# Patient Record
Sex: FEMALE | Race: WHITE | NOT HISPANIC OR LATINO
[De-identification: names, ages, dates, MRNs, and addresses within clinical notes are randomized per-mention and may not be internally consistent; named-entity substitution may affect disease eponyms.]

---

## 2017-07-18 ENCOUNTER — APPOINTMENT (OUTPATIENT)
Dept: ORTHOPEDIC SURGERY | Facility: CLINIC | Age: 52
End: 2017-07-18
Payer: COMMERCIAL

## 2017-07-18 VITALS — BODY MASS INDEX: 19.66 KG/M2 | HEIGHT: 65 IN | WEIGHT: 118 LBS

## 2017-07-18 DIAGNOSIS — Z84.89 FAMILY HISTORY OF OTHER SPECIFIED CONDITIONS: ICD-10-CM

## 2017-07-18 DIAGNOSIS — S61.217D LACERATION W/OUT FOREIGN BODY OF LEFT LITTLE FINGER W/OUT DAMAGE TO NAIL, SUBSEQUENT ENCOUNTER: ICD-10-CM

## 2017-07-18 DIAGNOSIS — Z82.62 FAMILY HISTORY OF OSTEOPOROSIS: ICD-10-CM

## 2017-07-18 DIAGNOSIS — Z80.9 FAMILY HISTORY OF MALIGNANT NEOPLASM, UNSPECIFIED: ICD-10-CM

## 2017-07-18 DIAGNOSIS — Z78.9 OTHER SPECIFIED HEALTH STATUS: ICD-10-CM

## 2017-07-18 DIAGNOSIS — Z85.850 PERSONAL HISTORY OF MALIGNANT NEOPLASM OF THYROID: ICD-10-CM

## 2017-07-18 PROCEDURE — 99203 OFFICE O/P NEW LOW 30 MIN: CPT

## 2017-07-18 PROCEDURE — 73140 X-RAY EXAM OF FINGER(S): CPT | Mod: F4

## 2018-04-01 ENCOUNTER — EMERGENCY (EMERGENCY)
Facility: HOSPITAL | Age: 53
LOS: 1 days | Discharge: HOME | End: 2018-04-01
Admitting: EMERGENCY MEDICINE
Payer: COMMERCIAL

## 2018-04-01 PROCEDURE — 73030 X-RAY EXAM OF SHOULDER: CPT | Mod: 26,LT

## 2018-04-01 PROCEDURE — 99283 EMERGENCY DEPT VISIT LOW MDM: CPT | Mod: 25

## 2018-04-02 PROCEDURE — 96372 THER/PROPH/DIAG INJ SC/IM: CPT

## 2018-04-02 PROCEDURE — 99283 EMERGENCY DEPT VISIT LOW MDM: CPT | Mod: 25

## 2018-04-02 PROCEDURE — 73030 X-RAY EXAM OF SHOULDER: CPT

## 2018-04-04 ENCOUNTER — APPOINTMENT (OUTPATIENT)
Dept: ULTRASOUND IMAGING | Facility: CLINIC | Age: 53
End: 2018-04-04
Payer: COMMERCIAL

## 2018-04-04 ENCOUNTER — OUTPATIENT (OUTPATIENT)
Dept: OUTPATIENT SERVICES | Facility: HOSPITAL | Age: 53
LOS: 1 days | End: 2018-04-04
Payer: COMMERCIAL

## 2018-04-04 DIAGNOSIS — Z00.8 ENCOUNTER FOR OTHER GENERAL EXAMINATION: ICD-10-CM

## 2018-04-04 PROCEDURE — 76881 US COMPL JOINT R-T W/IMG: CPT

## 2018-04-04 PROCEDURE — 76881 US COMPL JOINT R-T W/IMG: CPT | Mod: 26,LT

## 2018-04-05 ENCOUNTER — APPOINTMENT (OUTPATIENT)
Dept: MRI IMAGING | Facility: HOSPITAL | Age: 53
End: 2018-04-05
Payer: COMMERCIAL

## 2018-04-05 ENCOUNTER — OUTPATIENT (OUTPATIENT)
Dept: OUTPATIENT SERVICES | Facility: HOSPITAL | Age: 53
LOS: 1 days | End: 2018-04-05
Payer: COMMERCIAL

## 2018-04-05 DIAGNOSIS — Z00.8 ENCOUNTER FOR OTHER GENERAL EXAMINATION: ICD-10-CM

## 2018-04-05 PROCEDURE — 73221 MRI JOINT UPR EXTREM W/O DYE: CPT | Mod: 26,LT

## 2018-04-05 PROCEDURE — 73221 MRI JOINT UPR EXTREM W/O DYE: CPT

## 2018-07-03 ENCOUNTER — RESULT REVIEW (OUTPATIENT)
Age: 53
End: 2018-07-03

## 2019-08-19 ENCOUNTER — RESULT REVIEW (OUTPATIENT)
Age: 54
End: 2019-08-19

## 2019-09-25 ENCOUNTER — APPOINTMENT (OUTPATIENT)
Dept: CARDIOLOGY | Facility: CLINIC | Age: 54
End: 2019-09-25
Payer: COMMERCIAL

## 2019-09-25 ENCOUNTER — NON-APPOINTMENT (OUTPATIENT)
Age: 54
End: 2019-09-25

## 2019-09-25 VITALS — HEART RATE: 56 BPM | DIASTOLIC BLOOD PRESSURE: 70 MMHG | SYSTOLIC BLOOD PRESSURE: 100 MMHG

## 2019-09-25 VITALS
HEART RATE: 57 BPM | OXYGEN SATURATION: 100 % | RESPIRATION RATE: 16 BRPM | SYSTOLIC BLOOD PRESSURE: 94 MMHG | HEIGHT: 65 IN | BODY MASS INDEX: 20.16 KG/M2 | DIASTOLIC BLOOD PRESSURE: 64 MMHG | WEIGHT: 121 LBS

## 2019-09-25 DIAGNOSIS — Z87.891 PERSONAL HISTORY OF NICOTINE DEPENDENCE: ICD-10-CM

## 2019-09-25 PROCEDURE — 93000 ELECTROCARDIOGRAM COMPLETE: CPT

## 2019-09-25 PROCEDURE — 99244 OFF/OP CNSLTJ NEW/EST MOD 40: CPT

## 2019-09-25 PROCEDURE — 93306 TTE W/DOPPLER COMPLETE: CPT

## 2019-10-15 ENCOUNTER — APPOINTMENT (OUTPATIENT)
Dept: CARDIOLOGY | Facility: CLINIC | Age: 54
End: 2019-10-15

## 2019-10-18 ENCOUNTER — APPOINTMENT (OUTPATIENT)
Dept: MRI IMAGING | Facility: HOSPITAL | Age: 54
End: 2019-10-18
Payer: COMMERCIAL

## 2019-10-18 ENCOUNTER — OUTPATIENT (OUTPATIENT)
Dept: OUTPATIENT SERVICES | Facility: HOSPITAL | Age: 54
LOS: 1 days | End: 2019-10-18
Payer: COMMERCIAL

## 2019-10-18 DIAGNOSIS — Z00.8 ENCOUNTER FOR OTHER GENERAL EXAMINATION: ICD-10-CM

## 2019-10-18 PROCEDURE — 73221 MRI JOINT UPR EXTREM W/O DYE: CPT | Mod: 26,RT

## 2019-10-18 PROCEDURE — 73221 MRI JOINT UPR EXTREM W/O DYE: CPT

## 2019-10-31 ENCOUNTER — APPOINTMENT (OUTPATIENT)
Dept: RADIOLOGY | Facility: HOSPITAL | Age: 54
End: 2019-10-31
Payer: COMMERCIAL

## 2019-10-31 ENCOUNTER — OUTPATIENT (OUTPATIENT)
Dept: OUTPATIENT SERVICES | Facility: HOSPITAL | Age: 54
LOS: 1 days | End: 2019-10-31
Payer: COMMERCIAL

## 2019-10-31 DIAGNOSIS — M54.9 DORSALGIA, UNSPECIFIED: ICD-10-CM

## 2019-10-31 PROCEDURE — 72100 X-RAY EXAM L-S SPINE 2/3 VWS: CPT | Mod: 26

## 2019-10-31 PROCEDURE — 72100 X-RAY EXAM L-S SPINE 2/3 VWS: CPT

## 2020-04-25 ENCOUNTER — MESSAGE (OUTPATIENT)
Age: 55
End: 2020-04-25

## 2020-05-15 ENCOUNTER — APPOINTMENT (OUTPATIENT)
Dept: DISASTER EMERGENCY | Facility: CLINIC | Age: 55
End: 2020-05-15

## 2020-05-15 LAB
SARS-COV-2 IGG SERPL IA-ACNC: 0 INDEX
SARS-COV-2 IGG SERPL QL IA: NEGATIVE

## 2020-07-09 ENCOUNTER — APPOINTMENT (OUTPATIENT)
Dept: ORTHOPEDIC SURGERY | Facility: CLINIC | Age: 55
End: 2020-07-09
Payer: COMMERCIAL

## 2020-07-09 ENCOUNTER — OUTPATIENT (OUTPATIENT)
Dept: OUTPATIENT SERVICES | Facility: HOSPITAL | Age: 55
LOS: 1 days | End: 2020-07-09
Payer: COMMERCIAL

## 2020-07-09 ENCOUNTER — APPOINTMENT (OUTPATIENT)
Dept: RADIOLOGY | Facility: HOSPITAL | Age: 55
End: 2020-07-09
Payer: COMMERCIAL

## 2020-07-09 DIAGNOSIS — Z87.39 PERSONAL HISTORY OF OTHER DISEASES OF THE MUSCULOSKELETAL SYSTEM AND CONNECTIVE TISSUE: ICD-10-CM

## 2020-07-09 DIAGNOSIS — Z00.8 ENCOUNTER FOR OTHER GENERAL EXAMINATION: ICD-10-CM

## 2020-07-09 DIAGNOSIS — Z86.39 PERSONAL HISTORY OF OTHER ENDOCRINE, NUTRITIONAL AND METABOLIC DISEASE: ICD-10-CM

## 2020-07-09 DIAGNOSIS — Z87.19 PERSONAL HISTORY OF OTHER DISEASES OF THE DIGESTIVE SYSTEM: ICD-10-CM

## 2020-07-09 PROCEDURE — 72040 X-RAY EXAM NECK SPINE 2-3 VW: CPT

## 2020-07-09 PROCEDURE — 99203 OFFICE O/P NEW LOW 30 MIN: CPT

## 2020-07-09 PROCEDURE — 72070 X-RAY EXAM THORAC SPINE 2VWS: CPT

## 2020-07-10 PROCEDURE — 72070 X-RAY EXAM THORAC SPINE 2VWS: CPT | Mod: 26

## 2020-07-10 PROCEDURE — 72040 X-RAY EXAM NECK SPINE 2-3 VW: CPT | Mod: 26

## 2020-07-15 ENCOUNTER — TRANSCRIPTION ENCOUNTER (OUTPATIENT)
Age: 55
End: 2020-07-15

## 2020-07-16 PROBLEM — Z86.39 HISTORY OF HASHIMOTO THYROIDITIS: Status: RESOLVED | Noted: 2020-07-16 | Resolved: 2020-07-16

## 2020-07-16 PROBLEM — Z87.19 HISTORY OF CELIAC DISEASE: Status: RESOLVED | Noted: 2020-07-16 | Resolved: 2020-07-16

## 2020-07-16 PROBLEM — Z87.39 HISTORY OF FIBROMYALGIA: Status: RESOLVED | Noted: 2020-07-16 | Resolved: 2020-07-16

## 2020-07-16 RX ORDER — GABAPENTIN 100 MG/1
100 CAPSULE ORAL
Refills: 0 | Status: DISCONTINUED | COMMUNITY
Start: 2019-09-25 | End: 2020-07-16

## 2020-07-16 RX ORDER — IBUPROFEN 100 MG
TABLET,CHEWABLE ORAL
Refills: 0 | Status: DISCONTINUED | COMMUNITY
End: 2020-07-16

## 2020-07-16 NOTE — DISCUSSION/SUMMARY
[de-identified] : The underlying pathophysiology was reviewed in great detail with the patient as well as the various treatment options, including ice, analgesics, NSAIDs, Physical therapy, steroid injections, hyaluronic gel injections. \par \par Discussed and reviewed previous operation reports and surgical procedure.\par \par A prescription for Physical Therapy was provided.\par \par Activity modifications and restrictions were discussed. I advised avoiding overhead lifting. I advised the patient to work on good posture.\par \par FU 6 weeks or prn. \par \par All questions were answered, all alternatives discussed and the patient is in complete agreement with that plan. Follow-up appointment as instructed. Any issues and the patient will call or come in sooner.\par

## 2020-07-16 NOTE — HISTORY OF PRESENT ILLNESS
[de-identified] : FAISAL MOFFETT is a 54 year female presenting to the office complaining of bilateral shoulder pain. Patient has pertinent past medical history of fibromyalgia. Patient reports pain for approximately 10 years. Patient denies recent injury or trauma to the area.  On 02/19/2019 She had a left shoulder subacromial decompression, excision of distal clavicle, and labral repair with biceps tenodesis with Dr. Barry at [facility] [place]. On 11/29/2019 patient had a right shoulder rotator cuff repair, subacromial decompression, elbow extensor and flexor debridement, excisional lipoma, arthrotomy elbow repair of flexor and extensor tendon with Dr. Barry at [facility] [place].\par The patient describes the pain as a dull aching, and occasionally sharp pain localized to the anterior aspect of her bilateral shoulders that is intermittent in nature. Her  symptoms are exacerbated with any movement of the shoulder.  Patient reports the pain is waking her up at night.  Patient reports associated weakness. Denies numbness and tingling in the upper extremity.  She reports pain radiating down to her elbows on bilateral arms.  Patient is taking Duexis 800mg  for pain relief with mild relief in symptoms. Patient denies any other complaints at this time. Of note, patient saw a physician for neck pain yesterday. She has not had any imaging at this time. She reports numbness down the posterior aspect of her neck as well. Patient is interested in returning to physical therapy at this time.

## 2020-07-16 NOTE — PHYSICAL EXAM
[de-identified] : Cervical Spine/Neck\par Inspection/Palpation :\par ¦ Inspection : alignment midline, normal degree of lordosis present\par ¦ Skin : normal appearance, no masses, no tenderness, trachea midline\par ¦ Palpation : musculature is tender to palpation\par ¦ Tests and Signs : Spurling’s (-), Lhermitte’s (-)\par ¦ Range of Motion : arc of motion full in all planes, no crepitus or discomfort with flexion and extension. \par ¦ Stability : no subluxations or other evidence of instability demonstrated during range of motion testing\par o Muscle Strength : paraspinal muscle strength within normal limits\par o Muscle Tone : paraspinal muscle tone within normal limits\par o Muscle Bulk : normal, no atrophy\par o Cervical Lymph Nodes : no lymphadenopathy present\par \par Right Upper Extremity\par o Shoulder :\par ¦ Inspection/Palpation : tenderness to palpation greater tuberosity, no swelling, no deformities, surgical incisions well healed. \par ¦ Range of Motion : ACTIVE FORWARD ELEVATION: Measured at 145 degrees, ACTIVE EXTERNAL ROTATION: Measured at 30 degrees, ACTIVE INTERNAL ROTATION: Measured at L3\par ¦ Strength : external rotation 5/5, internal rotation 5/5, supraspinatus 5/5\par ¦ Stability : no joint instability on provocative testing\par ¦ Tests/Signs : Neer (-), Moreno (-)\par o Upper Arm : mild upper arm tenderness to palpation, no swelling, no deformities\par o Muscle Bulk : no atrophy\par o Sensation : sensation intact to light touch\par o Skin : no skin rash or discoloration\par o Vascular Exam : no edema, no cyanosis, radial and ulnar pulses normal\par \par o Elbow :\par ¦ Inspection/Palpation : tenderness to palpation medial and lateral epicondyle, no swelling, no deformities\par ¦ Range of Motion : full and painless in all planes, no crepitus\par ¦ Strength : flexion and extension 5/5, Resisted wrist and finger flexion and extension is pain free, and equal compared bilaterally. \par ¦ Stability : no joint instability on provocative testing\par o Muscle Bulk : no atrophy\par o Sensation : sensation intact to light touch\par o Skin : no skin lesions, no discoloration\par o Vascular Exam : no edema, no cyanosis, radial and ulnar pulses normal \par \par Left Upper Extremity\par o Shoulder :\par ¦ Inspection/Palpation :  tenderness to palpation greater tuberosity, no swelling, no deformities, surgical incisions well healed. \par ¦ Range of Motion : ACTIVE FORWARD ELEVATION: Measured at 155 degrees, ACTIVE EXTERNAL ROTATION: Measured at 50 degrees, ACTIVE INTERNAL ROTATION: Measured at T8\par ¦ Strength : external rotation 5/5, internal rotation 5/5, supraspinatus 5/5\par ¦ Stability : no joint instability on provocative testing\par ¦ Tests/Signs : Neer (-), Moreno (-)\par o Upper Arm : mild upper arm tenderness to palpation, no swelling, no deformities\par o Muscle Bulk : no atrophy\par o Sensation : sensation intact to light touch\par o Skin : no skin rash or discoloration\par o Vascular Exam : no edema, no cyanosis, radial and ulnar pulses normal\par \par o Elbow :\par ¦ Inspection/Palpation :  tenderness to palpation medial and lateral epicondyle, no swelling, no deformities\par ¦ Range of Motion : full and painless in all planes, no crepitus\par ¦ Strength : flexion and extension 5/5, Resisted wrist and finger flexion and extension is pain free, and equal compared bilaterally. \par ¦ Stability : no joint instability on provocative testing\par o Muscle Bulk : no atrophy\par o Sensation : sensation intact to light touch\par o Skin : no skin lesions, no discoloration\par o Vascular Exam : no edema, no cyanosis, radial and ulnar pulses normal

## 2020-07-24 ENCOUNTER — APPOINTMENT (OUTPATIENT)
Dept: ORTHOPEDIC SURGERY | Facility: CLINIC | Age: 55
End: 2020-07-24
Payer: COMMERCIAL

## 2020-07-24 VITALS
HEIGHT: 65 IN | SYSTOLIC BLOOD PRESSURE: 110 MMHG | DIASTOLIC BLOOD PRESSURE: 65 MMHG | BODY MASS INDEX: 20.83 KG/M2 | HEART RATE: 68 BPM | WEIGHT: 125 LBS | TEMPERATURE: 97.9 F

## 2020-07-24 DIAGNOSIS — M41.9 SCOLIOSIS, UNSPECIFIED: ICD-10-CM

## 2020-07-24 PROCEDURE — 72100 X-RAY EXAM L-S SPINE 2/3 VWS: CPT

## 2020-07-24 PROCEDURE — 99214 OFFICE O/P EST MOD 30 MIN: CPT

## 2020-07-24 NOTE — HISTORY OF PRESENT ILLNESS
[de-identified] : Ms. FAISAL MOFFETT  is a 54 year old female who presents with a chronic history of neck and upper thoracic pain.  Recently she has been having intermittent low back pain and at night her back can seize up.   She can get numbness down both arms.  Normal bowel and bladder control.   Denies any recent fevers, chills, sweats, weight loss, or infection.  She is doing physical therapy which has helped her range of motion.  She has taken a muscle relaxer which will help.  She is taking duexis. \par \par The patients past medical history, past surgical history, medications, allergies, and social history were reviewed by me today with the patient and documented accordingly.  In addition, the patient's family history, which is noncontributory to their visit, was also reviewed.\par

## 2020-07-24 NOTE — DISCUSSION/SUMMARY
[de-identified] : We discussed further treatment options.  She will continue with physical therapy.  We will obtain an MRI of her cervical spine.  Follow-up afterwards.

## 2020-07-24 NOTE — PHYSICAL EXAM
[de-identified] : Examination of the cervical spine reveals no midline or paraspinal tenderness to palpation. No cervical lymphadenopathy. Decreased range of motion with respect to flexion, extension, rotation, and lateral bending. Negative Spurlings. Negative Lhermitte's. Full range of motion bilateral shoulders without evidence of impingement. No instability of bilateral upper extremities.  Cranial nerves II through XII grossly intact. Intact sensation bilateral upper extremities. 5/5 deltoids biceps triceps wrist extensors wrist flexors finger flexors and hand intrinsics. 1+ biceps triceps and brachioradialis reflexes. Negative Arevalo's. 2+ radial pulse. Negative Tinel's over the cubital and carpal tunnel. No skin lesions on the right and left upper extremities.\par \par Examination of the thoracic and lumbar spine reveals no midline tenderness palpation, step-offs, or skin lesions. Decreased range of motion with respect to flexion, extension, lateral bending, and rotation. No tenderness to palpation of the sciatic notch. No tenderness palpation of the bilateral greater trochanters. No pain with passive internal/external rotation of the hips. No instability of bilateral lower extremities.  Negative ANGELIQUE. Negative straight leg raise bilaterally. No bowstring. Negative femoral stretch. 5 out of 5 iliopsoas, hip abductors, hips adductors, quadriceps, hamstrings, gastrocsoleus, tibialis anterior, extensor hallucis longus, peroneals. Grossly intact sensation to light touch bilateral lower extremities. 1+ patellar and Achilles reflexes. Downgoing Babinski. No clonus. Intact proprioception. Palpable pulses. No skin lesion and no edema on the right and left lower extremities. [de-identified] : Review of her previous thoracic x-rays reveal scoliosis\par \par AP lateral lumbar x-rays reveal some spondylosis

## 2020-07-25 ENCOUNTER — APPOINTMENT (OUTPATIENT)
Dept: CT IMAGING | Facility: IMAGING CENTER | Age: 55
End: 2020-07-25
Payer: COMMERCIAL

## 2020-07-25 ENCOUNTER — OUTPATIENT (OUTPATIENT)
Dept: OUTPATIENT SERVICES | Facility: HOSPITAL | Age: 55
LOS: 1 days | End: 2020-07-25
Payer: COMMERCIAL

## 2020-07-25 DIAGNOSIS — K57.90 DIVERTICULOSIS OF INTESTINE, PART UNSPECIFIED, WITHOUT PERFORATION OR ABSCESS WITHOUT BLEEDING: ICD-10-CM

## 2020-07-25 DIAGNOSIS — R10.84 GENERALIZED ABDOMINAL PAIN: ICD-10-CM

## 2020-07-25 DIAGNOSIS — K59.04 CHRONIC IDIOPATHIC CONSTIPATION: ICD-10-CM

## 2020-07-25 PROCEDURE — 74177 CT ABD & PELVIS W/CONTRAST: CPT | Mod: 26

## 2020-07-25 PROCEDURE — 74177 CT ABD & PELVIS W/CONTRAST: CPT

## 2020-08-01 ENCOUNTER — APPOINTMENT (OUTPATIENT)
Dept: MRI IMAGING | Facility: HOSPITAL | Age: 55
End: 2020-08-01
Payer: COMMERCIAL

## 2020-08-01 ENCOUNTER — RESULT REVIEW (OUTPATIENT)
Age: 55
End: 2020-08-01

## 2020-08-01 ENCOUNTER — OUTPATIENT (OUTPATIENT)
Dept: OUTPATIENT SERVICES | Facility: HOSPITAL | Age: 55
LOS: 1 days | End: 2020-08-01
Payer: COMMERCIAL

## 2020-08-01 DIAGNOSIS — M54.12 RADICULOPATHY, CERVICAL REGION: ICD-10-CM

## 2020-08-01 PROCEDURE — 72141 MRI NECK SPINE W/O DYE: CPT | Mod: 26

## 2020-08-01 PROCEDURE — 72141 MRI NECK SPINE W/O DYE: CPT

## 2020-08-07 ENCOUNTER — APPOINTMENT (OUTPATIENT)
Dept: ORTHOPEDIC SURGERY | Facility: CLINIC | Age: 55
End: 2020-08-07
Payer: COMMERCIAL

## 2020-08-07 PROCEDURE — 99214 OFFICE O/P EST MOD 30 MIN: CPT | Mod: 95

## 2020-08-08 NOTE — HISTORY OF PRESENT ILLNESS
[Home] : at home, [unfilled] , at the time of the visit. [Medical Office: ([facility])___] : at the medical office located in  [Verbal consent obtained from patient] : the patient, [unfilled] [de-identified] : Patient returns for follow-up today.  Symptoms are grossly unchanged.  She has had a cervical spine MRI.\par \par The patients past medical history, past surgical history, medications, allergies, and social history were reviewed by me today with the patient and documented accordingly.  In addition, the patient's family history, which is noncontributory to their visit, was also reviewed.\par

## 2020-08-08 NOTE — DISCUSSION/SUMMARY
[de-identified] : We reviewed her MRI.  We discussed further treatment options both nonsurgical and surgical.  She wishes to continue with nonsurgical treatment.  She will let me know of any changes or worsening of her symptoms.

## 2020-08-08 NOTE — PHYSICAL EXAM
[de-identified] : Review of her previous thoracic x-rays reveal scoliosis\par \par AP lateral lumbar x-rays reveal some spondylosis\par \par Cervical MRI does not reveal high-grade central compression.  She does have some areas of foraminal stenosis. [de-identified] : Examination of the cervical spine reveals no midline or paraspinal tenderness to palpation. Decreased range of motion with respect to flexion, extension, rotation, and lateral bending. Negative Spurlings. Negative Lhermitte's. Full range of motion bilateral shoulders without evidence of impingement. No instability of bilateral upper extremities.  Cranial nerves II through XII grossly intact.  Grossly preserved strength and sensation bilateral upper extremities no skin lesions on the right and left upper extremities.\par

## 2020-08-11 ENCOUNTER — RESULT REVIEW (OUTPATIENT)
Age: 55
End: 2020-08-11

## 2020-08-24 ENCOUNTER — APPOINTMENT (OUTPATIENT)
Dept: ORTHOPEDIC SURGERY | Facility: CLINIC | Age: 55
End: 2020-08-24
Payer: COMMERCIAL

## 2020-08-24 DIAGNOSIS — Z98.890 OTHER SPECIFIED POSTPROCEDURAL STATES: ICD-10-CM

## 2020-08-24 DIAGNOSIS — M25.521 PAIN IN RIGHT ELBOW: ICD-10-CM

## 2020-08-24 DIAGNOSIS — M25.522 PAIN IN LEFT ELBOW: ICD-10-CM

## 2020-08-24 DIAGNOSIS — M75.42 IMPINGEMENT SYNDROME OF LEFT SHOULDER: ICD-10-CM

## 2020-08-24 PROCEDURE — 99214 OFFICE O/P EST MOD 30 MIN: CPT

## 2020-08-24 RX ORDER — GABAPENTIN 600 MG/1
600 TABLET, COATED ORAL
Refills: 0 | Status: DISCONTINUED | COMMUNITY
Start: 2020-07-16 | End: 2020-08-24

## 2020-08-24 RX ORDER — GABAPENTIN 100 MG/1
100 CAPSULE ORAL EVERY MORNING
Refills: 0 | Status: DISCONTINUED | COMMUNITY
Start: 2020-07-16 | End: 2020-08-24

## 2020-08-24 NOTE — HISTORY OF PRESENT ILLNESS
[de-identified] : FAISAL MOFFETT is a 54 year female presenting to the office complaining of bilateral shoulder pain. Patient has pertinent past medical history of fibromyalgia. Patient reports pain for approximately 10 years. Patient denies recent injury or trauma to the area.  On 02/19/2019 She had a left shoulder subacromial decompression, excision of distal clavicle, and labral repair with biceps tenodesis [facility] Dr[facility] hospitals. On 11/29/2019 patient had a right shoulder rotator cuff repair, subacromial decompression, elbow extensor and flexor debridement, excisional lipoma, arthrotomy elbow repair of flexor and extensor tendon with Dr. Barry at hospitals.\par Since last visit patient reports completing the 9 approved visits of physical therapy. She reports bilateral shoulder fatigue and pain during activity. Patient reports crepitus of the left shoulder during range of motion, this is new since last visit. The patient describes the pain as a dull aching, and occasionally sharp pain localized to the anterior aspect of her bilateral shoulders that is intermittent in nature. Her  symptoms are exacerbated with any movement of the shoulder.  Patient reports the pain is waking her up at night.  Patient reports associated weakness. Denies numbness and tingling in the upper extremity.  She reports pain radiating down to her elbows on bilateral arms. She reports continued elbow pain with no change since last visit. She recently purchased the [employer]pper braces for her elbows. Patient is taking Duexis 800mg for pain relief with mild relief in symptoms.  She was recently started on Lyrica 50 mg bid and discontinued Gabapentin. Patient denies any other complaints at this time. Of note, patient is under the care of Dr. Santos for cervical spinal stenosis.

## 2020-08-24 NOTE — PHYSICAL EXAM
[de-identified] : Right Upper Extremity\par o Shoulder :\par ¦ Inspection/Palpation : tenderness to palpation greater tuberosity, no swelling, no deformities, surgical incisions well healed. \par ¦ Range of Motion : ACTIVE FORWARD ELEVATION: Measured at 145 degrees, ACTIVE EXTERNAL ROTATION: Measured at 40 degrees, ACTIVE INTERNAL ROTATION: Measured at L1\par ¦ Strength : external rotation 5/5, internal rotation 5/5, supraspinatus 5/5\par ¦ Stability : no joint instability on provocative testing\par ¦ Tests/Signs : Neer (-), Moreno (-)\par o Upper Arm : no upper arm tenderness to palpation, no swelling, no deformities\par o Muscle Bulk : no atrophy\par o Sensation : sensation intact to light touch\par o Skin : no skin rash or discoloration\par o Vascular Exam : no edema, no cyanosis, radial and ulnar pulses normal\par \par o Elbow :\par ¦ Inspection/Palpation : mild tenderness to palpation medial and lateral epicondyle, no swelling, no deformities\par ¦ Range of Motion : full and painless in all planes, no crepitus\par ¦ Strength : flexion and extension 5/5, Resisted wrist and finger flexion and extension is pain free, and equal compared bilaterally. \par ¦ Stability : no joint instability on provocative testing\par o Muscle Bulk : no atrophy\par o Sensation : sensation intact to light touch\par o Skin : no skin lesions, no discoloration\par o Vascular Exam : no edema, no cyanosis, radial and ulnar pulses normal \par \par Left Upper Extremity\par o Shoulder :\par ¦ Inspection/Palpation :  tenderness to palpation greater tuberosity, no swelling, no deformities, surgical incisions well healed. \par ¦ Range of Motion : ACTIVE FORWARD ELEVATION: Measured at 150 degrees, with subacromial crepitus,  ACTIVE EXTERNAL ROTATION: Measured at 50 degrees, ACTIVE INTERNAL ROTATION: Measured at L1\par ¦ Strength : external rotation 5/5, internal rotation 5/5, supraspinatus 5/5\par ¦ Stability : no joint instability on provocative testing\par ¦ Tests/Signs : Neer (+), Moreno (-)\par o Upper Arm : no upper arm tenderness to palpation, no swelling, no deformities\par o Muscle Bulk : no atrophy\par o Sensation : sensation intact to light touch\par o Skin : no skin rash or discoloration\par o Vascular Exam : no edema, no cyanosis, radial and ulnar pulses normal\par \par o Elbow :\par ¦ Inspection/Palpation :  mild tenderness to palpation medial and lateral epicondyle, no swelling, no deformities\par ¦ Range of Motion : full and painless in all planes, no crepitus\par ¦ Strength : flexion and extension 5/5, Resisted wrist and finger flexion and extension is pain free, and equal compared bilaterally. \par ¦ Stability : no joint instability on provocative testing\par o Muscle Bulk : no atrophy\par o Sensation : sensation intact to light touch\par o Skin : no skin lesions, no discoloration\par o Vascular Exam : no edema, no cyanosis, radial and ulnar pulses normal

## 2020-08-24 NOTE — DISCUSSION/SUMMARY
[de-identified] : The underlying pathophysiology was reviewed in great detail with the patient as well as the various treatment options, including ice, analgesics, NSAIDs, Physical therapy, steroid injections, hyaluronic gel injections. \par \par A home exercise sheet was given and discussed with the patient to follow.\par  \par Activity modifications and restrictions were discussed. I advised avoiding overhead lifting. I advised the patient to work on good posture. Activity modifications and restrictions were discussed. I recommend that she avoid heavy gripping/squeezing.  \par \par FU 6 weeks or prn. \par \par All questions were answered, all alternatives discussed and the patient is in complete agreement with that plan. Follow-up appointment as instructed. Any issues and the patient will call or come in sooner.\par

## 2020-12-04 ENCOUNTER — APPOINTMENT (OUTPATIENT)
Dept: PAIN MANAGEMENT | Facility: CLINIC | Age: 55
End: 2020-12-04
Payer: COMMERCIAL

## 2020-12-04 VITALS
WEIGHT: 129 LBS | DIASTOLIC BLOOD PRESSURE: 75 MMHG | HEIGHT: 65 IN | BODY MASS INDEX: 21.49 KG/M2 | SYSTOLIC BLOOD PRESSURE: 128 MMHG | HEART RATE: 128 BPM

## 2020-12-04 VITALS — TEMPERATURE: 96.5 F

## 2020-12-04 PROCEDURE — 99072 ADDL SUPL MATRL&STAF TM PHE: CPT

## 2020-12-04 PROCEDURE — 99204 OFFICE O/P NEW MOD 45 MIN: CPT

## 2020-12-04 RX ORDER — DULOXETINE HYDROCHLORIDE 20 MG/1
20 CAPSULE, DELAYED RELEASE ORAL
Refills: 0 | Status: DISCONTINUED | COMMUNITY
Start: 2020-07-09 | End: 2020-12-04

## 2020-12-04 NOTE — HISTORY OF PRESENT ILLNESS
[FreeTextEntry1] : Patient is a 55F with a hx of fibromyalgia, Hashimoto's thyroiditis, celiac disease, IBS presenting for initial consultation for pain. She follows with a rheumatologist Dr. Ирина Maldonado and was diagnosed with fibromyalgia about a year ago. She is currently taking lyrica 50 mg BID and continues to have breakthrough pain. She was previously on gabapentin but this made her very sleepy. She reports pain all over her body but especially in the shoulders, elbows, and knees as well as significant neck and back pain. She had MRI of the cervical spine in Aug 2020 that showed multilevel degenerative changes and mild spinal stenosis. She did 8 weeks of PT recently but did not have relief and since then has been seeing a chiropractor. She takes Duexis (ibuprofen/famotidine) PRN but usually needs it 1-2x/day. She reports poor sleep and gets 3-4 hours per night. She feels depressed and follows with a psychiatrist and takes lexapro. \par \par

## 2020-12-04 NOTE — ASSESSMENT
[FreeTextEntry1] : Patient is a 55F with a hx of fibromyalgia presenting for consultation for pain management. She is currently taking lyrica 50 mg BID. She also reports depression and is on escitalopram. She reported improvement with gabapentin but could not tolerate it due to sedation. She also reports poor sleep. \par \par Plan:\par -consider switching to nortriptyline from lexapro; patient will discuss with her psychiatrist \par -continue lyrica 50 mg BID\par -obtain labwork from rheumatology office

## 2020-12-04 NOTE — PHYSICAL EXAM
[General Appearance - Alert] : alert [General Appearance - In No Acute Distress] : in no acute distress [Oriented To Time, Place, And Person] : oriented to person, place, and time [Affect] : the affect was normal [Person] : oriented to person [Place] : oriented to place [Time] : oriented to time [Fluency] : fluency intact [Comprehension] : comprehension intact [Cranial Nerves Optic (II)] : visual acuity intact bilaterally,  visual fields full to confrontation, pupils equal round and reactive to light [Cranial Nerves Oculomotor (III)] : extraocular motion intact [Cranial Nerves Trigeminal (V)] : facial sensation intact symmetrically [Cranial Nerves Facial (VII)] : face symmetrical [Cranial Nerves Vestibulocochlear (VIII)] : hearing was intact bilaterally [Cranial Nerves Glossopharyngeal (IX)] : tongue and palate midline [Cranial Nerves Accessory (XI - Cranial And Spinal)] : head turning and shoulder shrug symmetric [Cranial Nerves Hypoglossal (XII)] : there was no tongue deviation with protrusion [Motor Tone] : muscle tone was normal in all four extremities [Motor Strength] : muscle strength was normal in all four extremities [Sensation Tactile Decrease] : light touch was intact [2+] : Ankle jerk left 2+ [Paresis Pronator Drift Right-Sided] : no pronator drift on the right [Paresis Pronator Drift Left-Sided] : no pronator drift on the left

## 2020-12-09 ENCOUNTER — APPOINTMENT (OUTPATIENT)
Dept: FAMILY MEDICINE | Facility: CLINIC | Age: 55
End: 2020-12-09
Payer: COMMERCIAL

## 2020-12-09 DIAGNOSIS — Z23 ENCOUNTER FOR IMMUNIZATION: ICD-10-CM

## 2020-12-09 PROCEDURE — 99204 OFFICE O/P NEW MOD 45 MIN: CPT

## 2020-12-09 PROCEDURE — 99072 ADDL SUPL MATRL&STAF TM PHE: CPT

## 2020-12-11 NOTE — HEALTH RISK ASSESSMENT
[] : No [No] : No [No falls in past year] : Patient reported no falls in the past year [0] : 2) Feeling down, depressed, or hopeless: Not at all (0) [[id number]] : 0

## 2020-12-11 NOTE — HISTORY OF PRESENT ILLNESS
[FreeTextEntry1] : New Patient [de-identified] : 55 year old female who suffers from fibromyalgia, started after shoulder surgery, see rheumatology and currently on lyrica for chronic pain without relief. Is a Nurse, going out on disability. No chest pain or sob. Voiding well. Difficulty, standing, sitting, lifting and using fine motor due to bilateral shoulder injury and surgery and fibromyalgia.

## 2020-12-11 NOTE — PHYSICAL EXAM
[No Acute Distress] : no acute distress [Well Nourished] : well nourished [Well Developed] : well developed [Well-Appearing] : well-appearing [Normal Sclera/Conjunctiva] : normal sclera/conjunctiva [PERRL] : pupils equal round and reactive to light [EOMI] : extraocular movements intact [Normal Outer Ear/Nose] : the outer ears and nose were normal in appearance [Normal Oropharynx] : the oropharynx was normal [No JVD] : no jugular venous distention [No Lymphadenopathy] : no lymphadenopathy [Supple] : supple [Thyroid Normal, No Nodules] : the thyroid was normal and there were no nodules present [No Respiratory Distress] : no respiratory distress  [No Accessory Muscle Use] : no accessory muscle use [Clear to Auscultation] : lungs were clear to auscultation bilaterally [Normal Rate] : normal rate  [Regular Rhythm] : with a regular rhythm [Normal S1, S2] : normal S1 and S2 [No Murmur] : no murmur heard [No Carotid Bruits] : no carotid bruits [No Abdominal Bruit] : a ~M bruit was not heard ~T in the abdomen [No Varicosities] : no varicosities [Pedal Pulses Present] : the pedal pulses are present [No Edema] : there was no peripheral edema [No Palpable Aorta] : no palpable aorta [No Extremity Clubbing/Cyanosis] : no extremity clubbing/cyanosis [Soft] : abdomen soft [Non Tender] : non-tender [Non-distended] : non-distended [No Masses] : no abdominal mass palpated [No HSM] : no HSM [Normal Bowel Sounds] : normal bowel sounds [Normal Posterior Cervical Nodes] : no posterior cervical lymphadenopathy [Normal Anterior Cervical Nodes] : no anterior cervical lymphadenopathy [No CVA Tenderness] : no CVA  tenderness [No Spinal Tenderness] : no spinal tenderness [No Joint Swelling] : no joint swelling [No Rash] : no rash [Coordination Grossly Intact] : coordination grossly intact [No Focal Deficits] : no focal deficits [Normal Gait] : normal gait [Normal Affect] : the affect was normal [Normal Insight/Judgement] : insight and judgment were intact [de-identified] : Decreased UE strength, point tenderness in multiple areas, seems in pain

## 2020-12-11 NOTE — ASSESSMENT
[FreeTextEntry1] : Continue current medications\par FU with rheum and Pain management\par Filled out disabilty forms\par FU for CPE

## 2021-01-04 ENCOUNTER — APPOINTMENT (OUTPATIENT)
Dept: FAMILY MEDICINE | Facility: CLINIC | Age: 56
End: 2021-01-04
Payer: COMMERCIAL

## 2021-01-04 ENCOUNTER — APPOINTMENT (OUTPATIENT)
Dept: FAMILY MEDICINE | Facility: HOSPITAL | Age: 56
End: 2021-01-04

## 2021-01-04 VITALS
BODY MASS INDEX: 21.83 KG/M2 | TEMPERATURE: 98.1 F | SYSTOLIC BLOOD PRESSURE: 98 MMHG | HEART RATE: 91 BPM | WEIGHT: 131 LBS | OXYGEN SATURATION: 100 % | RESPIRATION RATE: 16 BRPM | HEIGHT: 65 IN | DIASTOLIC BLOOD PRESSURE: 60 MMHG

## 2021-01-04 DIAGNOSIS — M48.02 SPINAL STENOSIS, CERVICAL REGION: ICD-10-CM

## 2021-01-04 PROCEDURE — 99214 OFFICE O/P EST MOD 30 MIN: CPT

## 2021-01-04 PROCEDURE — 99072 ADDL SUPL MATRL&STAF TM PHE: CPT

## 2021-01-05 ENCOUNTER — LABORATORY RESULT (OUTPATIENT)
Age: 56
End: 2021-01-05

## 2021-01-05 LAB
BASOPHILS # BLD AUTO: 0.01 K/UL
BASOPHILS NFR BLD AUTO: 0.2 %
EOSINOPHIL # BLD AUTO: 0.1 K/UL
EOSINOPHIL NFR BLD AUTO: 1.8 %
HCT VFR BLD CALC: 40.1 %
HGB BLD-MCNC: 12.5 G/DL
IMM GRANULOCYTES NFR BLD AUTO: 0 %
LYMPHOCYTES # BLD AUTO: 1.5 K/UL
LYMPHOCYTES NFR BLD AUTO: 27.4 %
MAN DIFF?: NORMAL
MCHC RBC-ENTMCNC: 28.1 PG
MCHC RBC-ENTMCNC: 31.2 GM/DL
MCV RBC AUTO: 90.1 FL
MONOCYTES # BLD AUTO: 0.74 K/UL
MONOCYTES NFR BLD AUTO: 13.5 %
NEUTROPHILS # BLD AUTO: 3.12 K/UL
NEUTROPHILS NFR BLD AUTO: 57.1 %
PLATELET # BLD AUTO: 265 K/UL
RBC # BLD: 4.45 M/UL
RBC # FLD: 13.1 %
TRANSFERRIN SERPL-MCNC: 224 MG/DL
WBC # FLD AUTO: 5.47 K/UL

## 2021-01-06 PROBLEM — M48.02 CERVICAL STENOSIS OF SPINE: Status: ACTIVE | Noted: 2020-08-08

## 2021-01-06 NOTE — HISTORY OF PRESENT ILLNESS
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 Recent lyrica was increased to tid\par and added amytryiptilene. Feels better, pain 5/10 at its best but better.\par No depression or anxiety. No chest pain or sob. Voiding well.

## 2021-01-06 NOTE — REVIEW OF SYSTEMS
[Fatigue] : fatigue [Joint Pain] : joint pain [Muscle Weakness] : muscle weakness [Muscle Pain] : muscle pain [Back Pain] : back pain [Negative] : Heme/Lymph

## 2021-01-06 NOTE — ASSESSMENT
[FreeTextEntry1] : Continue current medications\par Fu with rheum and neurology\par check labs \par will call with results

## 2021-01-06 NOTE — PHYSICAL EXAM
[No Acute Distress] : no acute distress [Well Nourished] : well nourished [Well Developed] : well developed [Well-Appearing] : well-appearing [Normal Sclera/Conjunctiva] : normal sclera/conjunctiva [PERRL] : pupils equal round and reactive to light [EOMI] : extraocular movements intact [Normal Outer Ear/Nose] : the outer ears and nose were normal in appearance [Normal Oropharynx] : the oropharynx was normal [No JVD] : no jugular venous distention [No Lymphadenopathy] : no lymphadenopathy [Supple] : supple [Thyroid Normal, No Nodules] : the thyroid was normal and there were no nodules present [No Respiratory Distress] : no respiratory distress  [No Accessory Muscle Use] : no accessory muscle use [Clear to Auscultation] : lungs were clear to auscultation bilaterally [Normal Rate] : normal rate  [Regular Rhythm] : with a regular rhythm [Normal S1, S2] : normal S1 and S2 [No Murmur] : no murmur heard [No Carotid Bruits] : no carotid bruits [No Abdominal Bruit] : a ~M bruit was not heard ~T in the abdomen [No Varicosities] : no varicosities [Pedal Pulses Present] : the pedal pulses are present [No Edema] : there was no peripheral edema [No Palpable Aorta] : no palpable aorta [No Extremity Clubbing/Cyanosis] : no extremity clubbing/cyanosis [Soft] : abdomen soft [Non Tender] : non-tender [Non-distended] : non-distended [No Masses] : no abdominal mass palpated [No HSM] : no HSM [Normal Bowel Sounds] : normal bowel sounds [Normal Posterior Cervical Nodes] : no posterior cervical lymphadenopathy [Normal Anterior Cervical Nodes] : no anterior cervical lymphadenopathy [No CVA Tenderness] : no CVA  tenderness [No Spinal Tenderness] : no spinal tenderness [No Joint Swelling] : no joint swelling [No Rash] : no rash [Coordination Grossly Intact] : coordination grossly intact [No Focal Deficits] : no focal deficits [Normal Gait] : normal gait [Normal Affect] : the affect was normal [Normal Insight/Judgement] : insight and judgment were intact [de-identified] : Decreased UE strength, point tenderness in multiple areas, seems in pain

## 2021-01-11 ENCOUNTER — TRANSCRIPTION ENCOUNTER (OUTPATIENT)
Age: 56
End: 2021-01-11

## 2021-01-11 LAB
25(OH)D3 SERPL-MCNC: 36.8 NG/ML
ALBUMIN SERPL ELPH-MCNC: 3.9 G/DL
ALP BLD-CCNC: 119 U/L
ALT SERPL-CCNC: 39 U/L
ANION GAP SERPL CALC-SCNC: 11 MMOL/L
AST SERPL-CCNC: 32 U/L
BILIRUB SERPL-MCNC: 0.3 MG/DL
BUN SERPL-MCNC: 20 MG/DL
CALCIUM SERPL-MCNC: 9.6 MG/DL
CHLORIDE SERPL-SCNC: 106 MMOL/L
CHOLEST SERPL-MCNC: 173 MG/DL
CO2 SERPL-SCNC: 25 MMOL/L
CREAT SERPL-MCNC: 0.99 MG/DL
ESTIMATED AVERAGE GLUCOSE: 108 MG/DL
FERRITIN SERPL-MCNC: 51 NG/ML
FOLATE SERPL-MCNC: 4.4 NG/ML
GLUCOSE SERPL-MCNC: 94 MG/DL
HBA1C MFR BLD HPLC: 5.4 %
HDLC SERPL-MCNC: 60 MG/DL
IRON SATN MFR SERPL: 19 %
IRON SERPL-MCNC: 52 UG/DL
LDLC SERPL CALC-MCNC: 94 MG/DL
NONHDLC SERPL-MCNC: 113 MG/DL
POTASSIUM SERPL-SCNC: 5 MMOL/L
PROT SERPL-MCNC: 6 G/DL
SODIUM SERPL-SCNC: 142 MMOL/L
THYROGLOB AB SERPL-ACNC: <20 IU/ML
THYROGLOB SERPL-MCNC: <0.2 NG/ML
TIBC SERPL-MCNC: 271 UG/DL
TRIGL SERPL-MCNC: 95 MG/DL
TSH SERPL-ACNC: 0.01 UIU/ML
UIBC SERPL-MCNC: 219 UG/DL
VIT B12 SERPL-MCNC: 282 PG/ML

## 2021-01-12 ENCOUNTER — TRANSCRIPTION ENCOUNTER (OUTPATIENT)
Age: 56
End: 2021-01-12

## 2021-01-15 ENCOUNTER — APPOINTMENT (OUTPATIENT)
Dept: SURGERY | Facility: CLINIC | Age: 56
End: 2021-01-15

## 2021-01-15 LAB
BASOPHILS # BLD AUTO: 0.03 K/UL
BASOPHILS NFR BLD AUTO: 0.5 %
EOSINOPHIL # BLD AUTO: 0.11 K/UL
EOSINOPHIL NFR BLD AUTO: 1.9 %
HCT VFR BLD CALC: 42.5 %
HGB BLD-MCNC: 13.2 G/DL
IMM GRANULOCYTES NFR BLD AUTO: 0.2 %
LYMPHOCYTES # BLD AUTO: 2.18 K/UL
LYMPHOCYTES NFR BLD AUTO: 36.8 %
MAN DIFF?: NORMAL
MCHC RBC-ENTMCNC: 27.7 PG
MCHC RBC-ENTMCNC: 31.1 GM/DL
MCV RBC AUTO: 89.1 FL
MONOCYTES # BLD AUTO: 0.62 K/UL
MONOCYTES NFR BLD AUTO: 10.5 %
NEUTROPHILS # BLD AUTO: 2.98 K/UL
NEUTROPHILS NFR BLD AUTO: 50.1 %
PLATELET # BLD AUTO: 321 K/UL
RBC # BLD: 4.77 M/UL
RBC # FLD: 13 %
SARS-COV-2 IGG SERPL IA-ACNC: 0.06 INDEX
SARS-COV-2 IGG SERPL QL IA: NEGATIVE
WBC # FLD AUTO: 5.93 K/UL

## 2021-01-19 ENCOUNTER — TRANSCRIPTION ENCOUNTER (OUTPATIENT)
Age: 56
End: 2021-01-19

## 2021-03-15 ENCOUNTER — APPOINTMENT (OUTPATIENT)
Dept: FAMILY MEDICINE | Facility: CLINIC | Age: 56
End: 2021-03-15
Payer: COMMERCIAL

## 2021-03-15 VITALS
OXYGEN SATURATION: 99 % | HEART RATE: 90 BPM | BODY MASS INDEX: 22.82 KG/M2 | TEMPERATURE: 97.6 F | SYSTOLIC BLOOD PRESSURE: 108 MMHG | HEIGHT: 65 IN | DIASTOLIC BLOOD PRESSURE: 70 MMHG | WEIGHT: 137 LBS | RESPIRATION RATE: 16 BRPM

## 2021-03-15 PROCEDURE — 99214 OFFICE O/P EST MOD 30 MIN: CPT

## 2021-03-15 PROCEDURE — 99072 ADDL SUPL MATRL&STAF TM PHE: CPT

## 2021-03-17 NOTE — PHYSICAL EXAM
[Well Nourished] : well nourished [Well Developed] : well developed [Well-Appearing] : well-appearing [Normal Sclera/Conjunctiva] : normal sclera/conjunctiva [PERRL] : pupils equal round and reactive to light [EOMI] : extraocular movements intact [Normal Outer Ear/Nose] : the outer ears and nose were normal in appearance [Normal Oropharynx] : the oropharynx was normal [No JVD] : no jugular venous distention [No Lymphadenopathy] : no lymphadenopathy [Supple] : supple [Thyroid Normal, No Nodules] : the thyroid was normal and there were no nodules present [No Respiratory Distress] : no respiratory distress  [No Accessory Muscle Use] : no accessory muscle use [Clear to Auscultation] : lungs were clear to auscultation bilaterally [Normal Rate] : normal rate  [Regular Rhythm] : with a regular rhythm [Normal S1, S2] : normal S1 and S2 [No Murmur] : no murmur heard [No Carotid Bruits] : no carotid bruits [No Abdominal Bruit] : a ~M bruit was not heard ~T in the abdomen [No Varicosities] : no varicosities [Pedal Pulses Present] : the pedal pulses are present [No Edema] : there was no peripheral edema [No Palpable Aorta] : no palpable aorta [No Extremity Clubbing/Cyanosis] : no extremity clubbing/cyanosis [Soft] : abdomen soft [Non Tender] : non-tender [Non-distended] : non-distended [No Masses] : no abdominal mass palpated [No HSM] : no HSM [Normal Bowel Sounds] : normal bowel sounds [Normal Posterior Cervical Nodes] : no posterior cervical lymphadenopathy [Normal Anterior Cervical Nodes] : no anterior cervical lymphadenopathy [No CVA Tenderness] : no CVA  tenderness [No Spinal Tenderness] : no spinal tenderness [No Joint Swelling] : no joint swelling [No Rash] : no rash [Coordination Grossly Intact] : coordination grossly intact [No Focal Deficits] : no focal deficits [Normal Gait] : normal gait [Normal Affect] : the affect was normal [Normal Insight/Judgement] : insight and judgment were intact [de-identified] : Decreased UE strength, point tenderness in multiple areas, seems in pain

## 2021-03-17 NOTE — HISTORY OF PRESENT ILLNESS
[FreeTextEntry1] : Check up [de-identified] : 55 year old female with fibromyalgia, chronic constipation and pain syndrome here for follow up.\par Still suffering with daily pain, unable to lift heavy things, ambulate for long periods of time. Increased\par fatigue and stress due to ailments. No chest pain or sob.

## 2021-03-17 NOTE — HEALTH RISK ASSESSMENT
[] : No [No] : No [No falls in past year] : Patient reported no falls in the past year [0] : 2) Feeling down, depressed, or hopeless: Not at all (0) [[id number]] : 0

## 2021-03-24 ENCOUNTER — LABORATORY RESULT (OUTPATIENT)
Age: 56
End: 2021-03-24

## 2021-03-26 ENCOUNTER — TRANSCRIPTION ENCOUNTER (OUTPATIENT)
Age: 56
End: 2021-03-26

## 2021-03-26 LAB — TSH SERPL-ACNC: 0.03 UIU/ML

## 2021-03-26 RX ORDER — LEVOTHYROXINE SODIUM 200 UG/1
200 TABLET ORAL DAILY
Refills: 0 | Status: DISCONTINUED | COMMUNITY
Start: 2019-09-25 | End: 2021-03-26

## 2021-03-29 ENCOUNTER — TRANSCRIPTION ENCOUNTER (OUTPATIENT)
Age: 56
End: 2021-03-29

## 2021-04-19 ENCOUNTER — TRANSCRIPTION ENCOUNTER (OUTPATIENT)
Age: 56
End: 2021-04-19

## 2021-05-03 ENCOUNTER — APPOINTMENT (OUTPATIENT)
Dept: FAMILY MEDICINE | Facility: CLINIC | Age: 56
End: 2021-05-03
Payer: COMMERCIAL

## 2021-05-03 VITALS
HEIGHT: 65 IN | HEART RATE: 85 BPM | OXYGEN SATURATION: 99 % | DIASTOLIC BLOOD PRESSURE: 70 MMHG | WEIGHT: 142 LBS | SYSTOLIC BLOOD PRESSURE: 100 MMHG | BODY MASS INDEX: 23.66 KG/M2 | RESPIRATION RATE: 14 BRPM

## 2021-05-03 PROCEDURE — 99072 ADDL SUPL MATRL&STAF TM PHE: CPT

## 2021-05-03 PROCEDURE — 99214 OFFICE O/P EST MOD 30 MIN: CPT

## 2021-05-06 NOTE — HEALTH RISK ASSESSMENT
[] : No [No] : No [One fall no injury in past year] : Patient reported one fall in the past year without injury [2] : 2) Feeling down, depressed, or hopeless for more than half of the days (2) [[id number]] : 4 [[id number]] : 12

## 2021-05-06 NOTE — HISTORY OF PRESENT ILLNESS
[FreeTextEntry1] : Check up [de-identified] : 55 year old with history of depression, fibromyalgia, chronic pain here for refills. Difficulty moving, getting up and with activities of daily living. No chest pain or sob. Generalized pain in shoulders, hips and knees. Decreased strength throughout. Getting more depressed due to her situation.

## 2021-05-06 NOTE — ASSESSMENT
[FreeTextEntry1] : Continue current medication\par Fu with psych and csw for depression\par continue current medication for chronic pain and fibromyalgia.

## 2021-05-06 NOTE — PHYSICAL EXAM
[Well Nourished] : well nourished [Well Developed] : well developed [Well-Appearing] : well-appearing [Normal Sclera/Conjunctiva] : normal sclera/conjunctiva [PERRL] : pupils equal round and reactive to light [EOMI] : extraocular movements intact [Normal Outer Ear/Nose] : the outer ears and nose were normal in appearance [Normal Oropharynx] : the oropharynx was normal [No JVD] : no jugular venous distention [No Lymphadenopathy] : no lymphadenopathy [Supple] : supple [Thyroid Normal, No Nodules] : the thyroid was normal and there were no nodules present [No Respiratory Distress] : no respiratory distress  [No Accessory Muscle Use] : no accessory muscle use [Clear to Auscultation] : lungs were clear to auscultation bilaterally [Normal Rate] : normal rate  [Regular Rhythm] : with a regular rhythm [Normal S1, S2] : normal S1 and S2 [No Murmur] : no murmur heard [No Carotid Bruits] : no carotid bruits [No Abdominal Bruit] : a ~M bruit was not heard ~T in the abdomen [No Varicosities] : no varicosities [Pedal Pulses Present] : the pedal pulses are present [No Edema] : there was no peripheral edema [No Palpable Aorta] : no palpable aorta [No Extremity Clubbing/Cyanosis] : no extremity clubbing/cyanosis [Soft] : abdomen soft [Non Tender] : non-tender [Non-distended] : non-distended [No Masses] : no abdominal mass palpated [No HSM] : no HSM [Normal Bowel Sounds] : normal bowel sounds [Normal Posterior Cervical Nodes] : no posterior cervical lymphadenopathy [Normal Anterior Cervical Nodes] : no anterior cervical lymphadenopathy [No CVA Tenderness] : no CVA  tenderness [No Spinal Tenderness] : no spinal tenderness [No Joint Swelling] : no joint swelling [No Rash] : no rash [Coordination Grossly Intact] : coordination grossly intact [No Focal Deficits] : no focal deficits [Normal Gait] : normal gait [Normal Affect] : the affect was normal [Normal Insight/Judgement] : insight and judgment were intact [de-identified] : Decreased UE strength, point tenderness in multiple areas, seems in pain

## 2021-05-20 ENCOUNTER — APPOINTMENT (OUTPATIENT)
Dept: FAMILY MEDICINE | Facility: CLINIC | Age: 56
End: 2021-05-20
Payer: COMMERCIAL

## 2021-05-20 VITALS
SYSTOLIC BLOOD PRESSURE: 98 MMHG | HEART RATE: 88 BPM | TEMPERATURE: 98.8 F | OXYGEN SATURATION: 99 % | RESPIRATION RATE: 14 BRPM | DIASTOLIC BLOOD PRESSURE: 66 MMHG

## 2021-05-20 DIAGNOSIS — Z00.00 ENCOUNTER FOR GENERAL ADULT MEDICAL EXAMINATION W/OUT ABNORMAL FINDINGS: ICD-10-CM

## 2021-05-20 PROCEDURE — 99396 PREV VISIT EST AGE 40-64: CPT

## 2021-05-20 PROCEDURE — 99072 ADDL SUPL MATRL&STAF TM PHE: CPT

## 2021-05-20 RX ORDER — ZOSTER VACCINE RECOMBINANT, ADJUVANTED 50 MCG/0.5
50 KIT INTRAMUSCULAR
Qty: 1 | Refills: 1 | Status: DISCONTINUED | COMMUNITY
Start: 2021-01-04 | End: 2021-05-20

## 2021-05-20 NOTE — HEALTH RISK ASSESSMENT
[No] : No [Two or more falls in past year] : Patient reported two or more falls in the past year [Patient reported PAP Smear was normal] : Patient reported PAP Smear was normal [Patient reported colonoscopy was normal] : Patient reported colonoscopy was normal [Transportation] : transportation [With Significant Other] : lives with significant other [] :  [] : No [PapSmearDate] : 08/2020 [ColonoscopyDate] : 6/2016 [ColonoscopyComments] : diverticulosis [de-identified] : Transportation - unable to drive self to appointments, relies on  to drive her.  [de-identified] : Not currently working due to medical conditions

## 2021-05-20 NOTE — PHYSICAL EXAM
[No Acute Distress] : no acute distress [Well Developed] : well developed [Looks Tired] : appears tired [Normal Sclera/Conjunctiva] : normal sclera/conjunctiva [PERRL] : pupils equal round and reactive to light [EOMI] : extraocular movements intact [Normal Outer Ear/Nose] : the outer ears and nose were normal in appearance [Normal Oropharynx] : the oropharynx was normal [No JVD] : no jugular venous distention [No Lymphadenopathy] : no lymphadenopathy [Supple] : supple [No Respiratory Distress] : no respiratory distress  [No Accessory Muscle Use] : no accessory muscle use [Clear to Auscultation] : lungs were clear to auscultation bilaterally [Normal Rate] : normal rate  [Regular Rhythm] : with a regular rhythm [Normal S1, S2] : normal S1 and S2 [Pedal Pulses Present] : the pedal pulses are present [Soft] : abdomen soft [Non Tender] : non-tender [Non-distended] : non-distended [Normal Mental Status] : the patient's orientation, memory, attention, language and fund of knowledge were normal [Depressed] : depressed

## 2021-05-20 NOTE — ASSESSMENT
[FreeTextEntry1] : Order placed for TSH/T4 check, as dose last adjusted March 2021. \par Compound topical pain medication refilled via telephone order with [facility] \par Patient to schedule colonoscopy this summer \par Patient to return to clinic in 3 months for follow up.

## 2021-05-20 NOTE — REVIEW OF SYSTEMS
[Fatigue] : fatigue [Constipation] : constipation [Joint Pain] : joint pain [Negative] : Integumentary [Diarrhea] : diarrhea [Melena] : no melena

## 2021-05-20 NOTE — HISTORY OF PRESENT ILLNESS
[FreeTextEntry1] : CPE  [de-identified] : With complaints of:\par \par Fibromyalgia - pain ongoing, pain averages 5-6 in multiple locations. Under care of pain management. Getting trigger point injections and may need to get nerve blocks in the near future. \par Requests refill of topical pain relief - diclofenac/gabapentin/keto/lido/prilo\par Needs referral to rheumatology and neurology \par Paying for chiropractor out of pocket \par \par #hypothyroid- has been on levothyroxine most of her life, says her TSH has been hard to normalize. last change in dosage March 2021\par #healthcare maintenance\par mammograms done at [facility]- gets u/s and MR yearly\par due for colonoscopy June 2021with Dr. Butler

## 2021-06-02 ENCOUNTER — TRANSCRIPTION ENCOUNTER (OUTPATIENT)
Age: 56
End: 2021-06-02

## 2021-06-02 ENCOUNTER — LABORATORY RESULT (OUTPATIENT)
Age: 56
End: 2021-06-02

## 2021-06-02 LAB — TSH SERPL-ACNC: 0.04 UIU/ML

## 2021-06-02 RX ORDER — LEVOTHYROXINE SODIUM 0.17 MG/1
175 TABLET ORAL DAILY
Qty: 90 | Refills: 3 | Status: DISCONTINUED | COMMUNITY
Start: 2021-01-11 | End: 2021-06-02

## 2021-06-10 ENCOUNTER — APPOINTMENT (OUTPATIENT)
Dept: NEUROLOGY | Facility: CLINIC | Age: 56
End: 2021-06-10
Payer: COMMERCIAL

## 2021-06-10 VITALS
SYSTOLIC BLOOD PRESSURE: 96 MMHG | WEIGHT: 145 LBS | HEIGHT: 65 IN | HEART RATE: 90 BPM | DIASTOLIC BLOOD PRESSURE: 68 MMHG | BODY MASS INDEX: 24.16 KG/M2

## 2021-06-10 VITALS
HEIGHT: 65 IN | DIASTOLIC BLOOD PRESSURE: 68 MMHG | SYSTOLIC BLOOD PRESSURE: 96 MMHG | WEIGHT: 145 LBS | BODY MASS INDEX: 24.16 KG/M2 | HEART RATE: 90 BPM

## 2021-06-10 PROCEDURE — 99215 OFFICE O/P EST HI 40 MIN: CPT

## 2021-06-10 PROCEDURE — 99072 ADDL SUPL MATRL&STAF TM PHE: CPT

## 2021-06-10 PROCEDURE — 99205 OFFICE O/P NEW HI 60 MIN: CPT

## 2021-06-10 RX ORDER — LEVOTHYROXINE SODIUM 0.15 MG/1
150 TABLET ORAL DAILY
Qty: 30 | Refills: 2 | Status: DISCONTINUED | COMMUNITY
Start: 2021-06-02 | End: 2021-06-10

## 2021-06-10 NOTE — REASON FOR VISIT
[Initial Evaluation] : an initial evaluation [FreeTextEntry1] : Fibromyalgia with forgetfulness imbalance and depression

## 2021-06-10 NOTE — PHYSICAL EXAM
[FreeTextEntry1] : Head:  Normocephalic Neck: Supple nontender no carotid bruits.  Spine:  Nontender negative straight leg raising.\par \par Mental Status:  Alert Oriented X3 except to the exact date.  Speech normal and no aphasia or dysarthria.  She recalls 2 out of 3 objects at 5 minutes.  She was able to subtract serial sevens.\par \par Cranial Nerves:  PERRL, Fundi normal Visual Fields full  EOMI no diplopia no ptosis no nystagmus, V through XII intact.\par \par Motor:  No drift, normal strength tone and coordination and no focal atrophy. No abnormal movements. No dysmetria.  Normal rapid alternating movements. \par \par DTRs: Symmetric and 2+.  Plantars flexor.  No Clonus.\par \par Sensory:  Normal testing with pin light touch and vibration and  Joint position sense.  Normal DSS to touch.\par \par Gait:  Normal including tandem walking heel toe walking and Rhomberg.\par

## 2021-06-10 NOTE — CONSULT LETTER
[Dear  ___] : Dear  [unfilled], [Consult Letter:] : I had the pleasure of evaluating your patient, [unfilled]. [Please see my note below.] : Please see my note below. [Consult Closing:] : Thank you very much for allowing me to participate in the care of this patient.  If you have any questions, please do not hesitate to contact me. [Sincerely,] : Sincerely, [FreeTextEntry3] : Colt Graham MD\par

## 2021-06-10 NOTE — ASSESSMENT
[FreeTextEntry1] : Impression: This patient's presentation is consistent with fibromyalgia.  In addition she has other complaints including forgetfulness depression and imbalance.  Neurological exam is essentially normal.  There is the possibility of polypharmacy.\par \par Recommendations: This patient does deserve a neurological work-up including MRI of the brain EEG and formal neuropsychological testing.  She is scheduled for second rheumatological opinion.\par

## 2021-06-10 NOTE — HISTORY OF PRESENT ILLNESS
[FreeTextEntry1] : Rosaura Elmore seen for an office saltation.  She has a diagnosis of fibromyalgia dating back to 2019 following bilateral shoulder surgery.  She did develop multifocal pain including joint pain and numbness and tingling in both lower extremities.  She also developed forgetfulness and depression with panic attacks.  She describes imbalance and an unsteady gait.  He attributes the forgetfulness to different medications that she receives.\par \par Specifically she receives Lexapro 20 mg each a.m. Lyrica 50 mg twice daily amitriptyline 25 mg at bedtime and Synthroid.\par \par There is a history of thyroid cancer status post surgery on thyroid replacement.\par \par

## 2021-06-11 ENCOUNTER — TRANSCRIPTION ENCOUNTER (OUTPATIENT)
Age: 56
End: 2021-06-11

## 2021-06-15 ENCOUNTER — OUTPATIENT (OUTPATIENT)
Dept: OUTPATIENT SERVICES | Facility: HOSPITAL | Age: 56
LOS: 1 days | End: 2021-06-15
Payer: COMMERCIAL

## 2021-06-15 DIAGNOSIS — R68.89 OTHER GENERAL SYMPTOMS AND SIGNS: ICD-10-CM

## 2021-06-15 DIAGNOSIS — M79.7 FIBROMYALGIA: ICD-10-CM

## 2021-06-15 PROCEDURE — 95812 EEG 41-60 MINUTES: CPT

## 2021-06-15 NOTE — EEG REPORT - NS EEG TEXT BOX
[facility] Report of Routine EEG with Video And Report of DigitalCompressed Spectral Array Analysis  [facility]: [address], [address] [place] [address], Phone: [phone] [facility]: [address], [place] [address], Phone: [phone] Office: [address] [place] [address], [place] [address], [place] [address], Phone: [phone]  Patient Name: FAISAL MOFFETT   Age: 55 year : 1965 MRN #: -, Location: - Referring Physician: JANELL ABRAHAM   EEG #:  Study Date: 6/15/2021   Start Time: 10:40:29 AM    Study Duration: 21.3 		  Technical Information:					 On Instrument: - Placement and Labeling of Electrodes: The EEG was performed utilizing 20 channels referential EEG connections (coronal over temporal over parasagittal montage) using all standard 10-20 electrode placements with EKG.  Recording was at a sampling rate of 256 samples per second per channel.  Time synchronized digital video recording was done simultaneously with EEG recording.  A low light infrared camera was used for low light recording.  Estuardo and seizure detection algorithms were utilized. CSA Technical Component: Quantitative EEG analysis using a separate Compressed Spectral Array (CSA) software package was conducted in real-time and run at bedside after set up by the technician, digitally displaying the power of electrographic frequencies included in the 1-30Hz band using a graded color map.  This data was reviewed and interpreted independently, and is reported in a separate section below.  History:  55  y o F pt p/w increase in loss of memory intermittent imbalance. Pmhx Fibromialgia R/o sz  Medication No Data.  Study Interpretation:  FINDINGS:  The background was continuous, spontaneously variable and reactive.  During wakefulness, the posteriorly dominant rhythm consisted of symmetric, well modulated 10-11 Hz activity, with an amplitude to 30 uV, that attenuated to eye opening.  Low amplitude central beta was noted in wakefulness.  Background Slowing: Generalized slowing: none was present. Focal slowing: none was present.  Sleep Background: Drowsiness was characterized by fragmentation, attenuation, and slowing of the background activity.   Stage II sleep transients were not recorded.  Epileptiform Activity:  No epileptiform discharges were present.  Events: No clinical events were recorded. No seizures were recorded.  Activation Procedures:  -Hyperventilation was not performed.   -Photic stimulation was performed and did not elicit any abnormalities.    Artifacts: Intermittent myogenic and movement artifacts were noted.  ECG: The heart rate on single channel ECG was predominantly between 60-80 BPM.  EEG Classification / Summary: Normal Routine EEG Study   Clinical Impression: There were no epileptiform abnormalities recorded.      ________________________________________    Luis Dunn MD PhD Director, Epilepsy Division, [facility]

## 2021-07-08 ENCOUNTER — OUTPATIENT (OUTPATIENT)
Dept: OUTPATIENT SERVICES | Facility: HOSPITAL | Age: 56
LOS: 1 days | End: 2021-07-08
Payer: COMMERCIAL

## 2021-07-08 ENCOUNTER — APPOINTMENT (OUTPATIENT)
Dept: MRI IMAGING | Facility: HOSPITAL | Age: 56
End: 2021-07-08
Payer: COMMERCIAL

## 2021-07-08 DIAGNOSIS — M79.7 FIBROMYALGIA: ICD-10-CM

## 2021-07-08 DIAGNOSIS — R68.89 OTHER GENERAL SYMPTOMS AND SIGNS: ICD-10-CM

## 2021-07-08 PROCEDURE — 70553 MRI BRAIN STEM W/O & W/DYE: CPT | Mod: 26

## 2021-07-08 PROCEDURE — A9579: CPT

## 2021-07-08 PROCEDURE — 70553 MRI BRAIN STEM W/O & W/DYE: CPT

## 2021-07-09 ENCOUNTER — APPOINTMENT (OUTPATIENT)
Dept: NEUROLOGY | Facility: CLINIC | Age: 56
End: 2021-07-09
Payer: COMMERCIAL

## 2021-07-09 VITALS
BODY MASS INDEX: 24.99 KG/M2 | SYSTOLIC BLOOD PRESSURE: 98 MMHG | HEIGHT: 65 IN | HEART RATE: 86 BPM | DIASTOLIC BLOOD PRESSURE: 76 MMHG | WEIGHT: 150 LBS

## 2021-07-09 PROCEDURE — 99215 OFFICE O/P EST HI 40 MIN: CPT

## 2021-07-09 PROCEDURE — 99072 ADDL SUPL MATRL&STAF TM PHE: CPT

## 2021-07-09 NOTE — HISTORY OF PRESENT ILLNESS
[FreeTextEntry1] : Rosaura Elmore presents for an office visit.  She has developed headache predominantly right hemicranial and posterior.  She has been receiving pain management with trigger point injections and there is improvement.  Her other problems are basically unchanged including multifocal pain patchy numbness and tingling depression and forgetfulness.  She also describes unsteady gait.\par \par MRI brain with and without contrast performed on 7/8/2021.  The report describes a tiny acute lacunar infarct in the right medial parietal lobe.  However on my review of the films and as discussed with neuroradiologist Dr. Prater I believe this finding is nonspecific and could suggest another insignificant vascular anomaly.  The patient's clinical presentation is not consistent with stroke.\par \par EEG performed recently is normal.\par \par

## 2021-07-09 NOTE — PHYSICAL EXAM
[FreeTextEntry1] : Head:  Normocephalic Neck: Supple nontender no carotid bruits.  Spine:  Nontender negative straight leg raising.\par \par Mental Status:  Alert Oriented X3 Speech normal and no aphasia or dysarthria.  She is attentive during the exam and does not appear depressed.  Unchanged from the prior exam of 6/10/2021.\par \par Cranial Nerves:  PERRL, Fundi normal Visual Fields full  EOMI no diplopia no ptosis no nystagmus, V through XII intact.\par \par Motor:  No drift, normal strength tone and coordination and no focal atrophy. No abnormal movements. No dysmetria.  Normal rapid alternating movements. \par \par DTRs: Symmetric and 2+.  Plantars flexor.  No Clonus.\par \par Sensory:  Normal testing with pin light touch and vibration and  Joint position sense.  Normal DSS to touch.\par \par Gait:  Normal including tandem walking heel toe walking and Rhomberg.\par

## 2021-07-09 NOTE — DATA REVIEWED
[de-identified] : MRI brain with and without contrast performed on 7/8/2021.  The report describes a tiny acute lacunar infarct in the right medial parietal lobe.  However on my review of the films and as discussed with neuroradiologist Dr. Prater I believe this finding is nonspecific and could suggest another insignificant vascular anomaly.  The patient's clinical presentation is not consistent with stroke.

## 2021-07-09 NOTE — ASSESSMENT
[FreeTextEntry1] : Impression: This patient's presentation remains consistent with fibromyalgia.  She does have associated complaints of forgetfulness imbalance multifocal pain and she is having headache.  There is a component of depression.\par \par The brain MRI of 7/8/2021 I suspect may be unremarkable.  The punctate area in the right medial parietal lobe may not represent acute stroke.  This is I believe a nonspecific finding that could suggest another vascular anomaly.  The patient's clinical presentation does not suggest stroke.  EEG is normal.\par \par Recommendations: Await neuropsychological testing.  Office follow-up in 2 to 3 months.  Repeat brain MRI with and without contrast at a later date as discussed with the patient.  \par

## 2021-07-27 ENCOUNTER — NON-APPOINTMENT (OUTPATIENT)
Age: 56
End: 2021-07-27

## 2021-07-29 ENCOUNTER — NON-APPOINTMENT (OUTPATIENT)
Age: 56
End: 2021-07-29

## 2021-07-29 ENCOUNTER — APPOINTMENT (OUTPATIENT)
Dept: RHEUMATOLOGY | Facility: CLINIC | Age: 56
End: 2021-07-29
Payer: COMMERCIAL

## 2021-07-29 VITALS
OXYGEN SATURATION: 99 % | HEIGHT: 65 IN | RESPIRATION RATE: 14 BRPM | WEIGHT: 152 LBS | BODY MASS INDEX: 25.33 KG/M2 | HEART RATE: 93 BPM | DIASTOLIC BLOOD PRESSURE: 70 MMHG | SYSTOLIC BLOOD PRESSURE: 100 MMHG | TEMPERATURE: 97.2 F

## 2021-07-29 DIAGNOSIS — M79.10 MYALGIA, UNSPECIFIED SITE: ICD-10-CM

## 2021-07-29 DIAGNOSIS — R26.9 UNSPECIFIED ABNORMALITIES OF GAIT AND MOBILITY: ICD-10-CM

## 2021-07-29 DIAGNOSIS — R23.8 OTHER SKIN CHANGES: ICD-10-CM

## 2021-07-29 DIAGNOSIS — H04.123 DRY EYE SYNDROME OF BILATERAL LACRIMAL GLANDS: ICD-10-CM

## 2021-07-29 PROCEDURE — 99204 OFFICE O/P NEW MOD 45 MIN: CPT

## 2021-08-19 ENCOUNTER — NON-APPOINTMENT (OUTPATIENT)
Age: 56
End: 2021-08-19

## 2021-08-19 ENCOUNTER — APPOINTMENT (OUTPATIENT)
Dept: CARDIOLOGY | Facility: CLINIC | Age: 56
End: 2021-08-19
Payer: COMMERCIAL

## 2021-08-19 VITALS — DIASTOLIC BLOOD PRESSURE: 80 MMHG | SYSTOLIC BLOOD PRESSURE: 100 MMHG

## 2021-08-19 VITALS
RESPIRATION RATE: 16 BRPM | TEMPERATURE: 97 F | HEIGHT: 65 IN | DIASTOLIC BLOOD PRESSURE: 76 MMHG | SYSTOLIC BLOOD PRESSURE: 104 MMHG | HEART RATE: 58 BPM | OXYGEN SATURATION: 99 %

## 2021-08-19 DIAGNOSIS — R00.1 BRADYCARDIA, UNSPECIFIED: ICD-10-CM

## 2021-08-19 PROCEDURE — 99215 OFFICE O/P EST HI 40 MIN: CPT

## 2021-08-19 PROCEDURE — 93000 ELECTROCARDIOGRAM COMPLETE: CPT

## 2021-08-19 RX ORDER — FAMOTIDINE 10 MG/1
TABLET, FILM COATED ORAL
Refills: 0 | Status: DISCONTINUED | COMMUNITY
End: 2021-08-19

## 2021-08-19 NOTE — PHYSICAL EXAM
[Well Developed] : well developed [Well Nourished] : well nourished [No Acute Distress] : no acute distress [Normal Conjunctiva] : normal conjunctiva [Normal Venous Pressure] : normal venous pressure [No Carotid Bruit] : no carotid bruit [Normal S1, S2] : normal S1, S2 [No Murmur] : no murmur [No Rub] : no rub [No Gallop] : no gallop [Clear Lung Fields] : clear lung fields [Good Air Entry] : good air entry [No Respiratory Distress] : no respiratory distress  [Soft] : abdomen soft [Non Tender] : non-tender [No Masses/organomegaly] : no masses/organomegaly [Normal Bowel Sounds] : normal bowel sounds [Abnormal Gait] : abnormal gait [No Edema] : no edema [No Cyanosis] : no cyanosis [No Clubbing] : no clubbing [No Varicosities] : no varicosities [No Rash] : no rash [No Skin Lesions] : no skin lesions [Moves all extremities] : moves all extremities [No Focal Deficits] : no focal deficits [Normal Speech] : normal speech [Alert and Oriented] : alert and oriented [Normal memory] : normal memory [de-identified] : patient unable to ambulate on treadmill/frequent falls

## 2021-08-19 NOTE — REVIEW OF SYSTEMS
[Negative] : Heme/Lymph [FreeTextEntry2] : see hpi [FreeTextEntry5] : see hpi [de-identified] : see hpi

## 2021-08-19 NOTE — REASON FOR VISIT
[Symptom and Test Evaluation] : symptom and test evaluation [FreeTextEntry1] : This is a 55-year-old woman who presents for reevaluation. She continues to be treated for fibromyalgia. She was found to have a lacunar infarct on MRI and was advised to have an echocardiogram. The patient has a long history of chest discomfort that is midsternal and has been going on for years lasting an indeterminate amount of time.

## 2021-08-19 NOTE — ASSESSMENT
[FreeTextEntry1] : Impression:\par 1. Atypical chest pain in patient with abnormal EKG who is unable to exercise on a treadmill\par 2. Recent lacunar infarct. Given location of infarct on MRI would not primarily think of embolic phenomenon. However if neurology service feels this is a possibility and transthoracic echo is nonrevealing could consider transesophageal echo and ILR\par \par Plan\par \par 1. transthoracic echocardiogram as requested by neurology\par 2.Vasodilator myocardial perfusion imaging to evaluate chest pain and abnormal EKG

## 2021-08-25 ENCOUNTER — APPOINTMENT (OUTPATIENT)
Dept: CARDIOLOGY | Facility: CLINIC | Age: 56
End: 2021-08-25
Payer: COMMERCIAL

## 2021-08-25 PROCEDURE — 93306 TTE W/DOPPLER COMPLETE: CPT

## 2021-08-30 ENCOUNTER — RESULT REVIEW (OUTPATIENT)
Age: 56
End: 2021-08-30

## 2021-08-31 ENCOUNTER — APPOINTMENT (OUTPATIENT)
Dept: CARDIOLOGY | Facility: CLINIC | Age: 56
End: 2021-08-31
Payer: COMMERCIAL

## 2021-08-31 PROCEDURE — 93015 CV STRESS TEST SUPVJ I&R: CPT

## 2021-08-31 PROCEDURE — A9500: CPT

## 2021-08-31 PROCEDURE — 78452 HT MUSCLE IMAGE SPECT MULT: CPT

## 2021-09-01 DIAGNOSIS — Z01.818 ENCOUNTER FOR OTHER PREPROCEDURAL EXAMINATION: ICD-10-CM

## 2021-09-02 ENCOUNTER — APPOINTMENT (OUTPATIENT)
Dept: FAMILY MEDICINE | Facility: CLINIC | Age: 56
End: 2021-09-02
Payer: COMMERCIAL

## 2021-09-02 VITALS
TEMPERATURE: 97.3 F | RESPIRATION RATE: 16 BRPM | BODY MASS INDEX: 25.49 KG/M2 | HEIGHT: 65 IN | DIASTOLIC BLOOD PRESSURE: 70 MMHG | SYSTOLIC BLOOD PRESSURE: 112 MMHG | HEART RATE: 70 BPM | OXYGEN SATURATION: 97 % | WEIGHT: 153 LBS

## 2021-09-02 PROCEDURE — 99214 OFFICE O/P EST MOD 30 MIN: CPT

## 2021-09-02 RX ORDER — IBUPROFEN AND FAMOTIDINE 800; 26.6 MG/1; MG/1
800-26.6 TABLET, COATED ORAL
Qty: 60 | Refills: 1 | Status: DISCONTINUED | COMMUNITY
Start: 2020-07-16 | End: 2021-09-02

## 2021-09-02 NOTE — PHYSICAL EXAM
[General Appearance - Alert] : alert [General Appearance - In No Acute Distress] : in no acute distress [General Appearance - Well Nourished] : well nourished [Sclera] : the sclera and conjunctiva were normal [PERRL With Normal Accommodation] : pupils were equal in size, round, and reactive to light [Extraocular Movements] : extraocular movements were intact [Outer Ear] : the ears and nose were normal in appearance [Nasal Cavity] : the nasal mucosa and septum were normal [Oropharynx] : the oropharynx was normal [Neck Appearance] : the appearance of the neck was normal [Thyroid Diffuse Enlargement] : the thyroid was not enlarged [Auscultation Breath Sounds / Voice Sounds] : lungs were clear to auscultation bilaterally [Heart Rate And Rhythm] : heart rate was normal and rhythm regular [Heart Sounds] : normal S1 and S2 [Murmurs] : no murmurs [Edema] : there was no peripheral edema [Bowel Sounds] : normal bowel sounds [Abdomen Soft] : soft [Abdomen Tenderness] : non-tender [No CVA Tenderness] : no ~M costovertebral angle tenderness [No Spinal Tenderness] : no spinal tenderness [Nail Clubbing] : no clubbing  or cyanosis of the fingernails [Musculoskeletal - Swelling] : no joint swelling seen [Motor Tone] : muscle strength and tone were normal [Skin Color & Pigmentation] : normal skin color and pigmentation [] : no rash [Motor Exam] : the motor exam was normal [No Focal Deficits] : no focal deficits [Oriented To Time, Place, And Person] : oriented to person, place, and time [Impaired Insight] : insight and judgment were intact [Affect] : the affect was normal [FreeTextEntry1] : appears fatigued

## 2021-09-02 NOTE — ASSESSMENT
[FreeTextEntry1] : FAISAL MOFFETT is a 55 year old woman who presents with prior dx of FMS which I agree with given sx as above and exam with diffuse soft tissue tender points, skin hypersensitivity, appearing fatigued. Pt also with mild dry eyes and dry mouth chronically, slightly imbalanced gait on exam today and with hx of issues with balance and falls. Paucity of inflammatory arthritis or CTD sx today. \par \par - recommend to c/w current regimen of medications at present and we will try to optimize adjunctive treatments however if this is not effective we did discuss retrial of gabapentin instead of Lyrica or trial of Savella\par - Reviewed stretching, light aerobic exercises, massage, heat as adjuncts for FMS pain. \par - Discussed nonpharmacologic FMS therapies as well - encouraged to incorporate small activities that she finds beneficial during her day, optimize stretching and light exercise, and be mindful of mood, be aware not to overextend on days she has less pain, ensure adequate rest between strenuous activities. Patient verbalized understanding. \par - forms completed for ongoing disability, it is my medical opinion based on today's visit that she is unable to work at present given her symptoms. This will be re-evaluated on an ongoing basis. \par - RTC in 3 months

## 2021-09-02 NOTE — DATA REVIEWED
[FreeTextEntry1] : Available notes, and pertinent labs & imaging in EMR reviewed. \par \par ZARINA, RF negative in 2020

## 2021-09-02 NOTE — REVIEW OF SYSTEMS
[Negative] : Heme/Lymph [Feeling Poorly] : feeling poorly [Feeling Tired] : feeling tired [Arthralgias] : arthralgias [Dry Eyes] : dryness of the eyes [Joint Pain] : joint pain [As Noted in HPI] : as noted in HPI [Sleep Disturbances] : sleep disturbances [Anxiety] : anxiety [Depression] : depression [Joint Swelling] : no joint swelling [Joint Stiffness] : no joint stiffness [Suicidal] : not suicidal

## 2021-09-02 NOTE — HISTORY OF PRESENT ILLNESS
[FreeTextEntry1] : FAISAL MOFFETT is a 55 year old woman who presents with hx of FMS, here for transition rheumatologic care, previously following with Dr Maldonado. Symptoms include chronic headaches, persistent fatigue, gait instability, frequent falls, forgetfulness, diffuse arthralgias, myalgias, skin hypersensitivity, depression/anxiety/panic attacks. Symptoms remain fairly active despite current regimen of medications but she reports she is markedly improved from prior to being on this regimen. Adjunctive measures and other related sx as below -- \par \par + swimming daily which helps -- notes cold temperatures are very beneficial for her \par + Trigger injections, chiropractor helping -- Roe Ulloa\par + low pressure ?glaucoma and dry eyes -- getting plugs, on Timoptic, Genteal with moderate improvement \par + chest pain, cardiac w/u negative, likely ?panic attacks\par + no periods since ablation in 2014 -- ?due for DEXA with GYN, reports last one was normal \par + chronic bloating and nonspecific GI sx, reports last colonoscopy normal but does have bouts of diverticulitis \par + Has been on disability x 1 year due to inability to work 2/2 above\par + 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 APLS ROS -  2 uncomplicated pregnancies, 2 miscarriage, no thrombotic events.  \par \par Inflammatory arthritis ROS negative for symmetrical peripheral joint synovitis, prolonged AM stiffness, enthesitis, dactylitis, psoriasis/ rashes, eye inflammation, inflammatory low back pain, IBD. \par \par Prior/failed meds -- Cymbalta 2/2 SE, gabapentin but not sure why it was stopped

## 2021-09-02 NOTE — REVIEW OF SYSTEMS
[Fatigue] : fatigue [Constipation] : constipation [Joint Pain] : joint pain [Negative] : Integumentary [Diarrhea] : diarrhea [Melena] : no melena [FreeTextEntry5] : o

## 2021-09-02 NOTE — ASSESSMENT
[FreeTextEntry1] : Continue current medication regimen - patient seen by Dr. Toney 7/29/2021, recommeded to continue, may add other neuropathic medications to regimen. Refills provided of existing medications. Will need to call [facility] pharmacy in order to refill pain cream. \par \par Will call Dr. Lim in an effort to expedite approval to move forward with PCI with Dr. Simental\par \par Await blood work to refill levothyroxine to account for need to change dosage

## 2021-09-02 NOTE — PHYSICAL EXAM
[No Acute Distress] : no acute distress [Well Developed] : well developed [Looks Tired] : appears tired [Normal Sclera/Conjunctiva] : normal sclera/conjunctiva [PERRL] : pupils equal round and reactive to light [EOMI] : extraocular movements intact [Normal Outer Ear/Nose] : the outer ears and nose were normal in appearance [Normal Oropharynx] : the oropharynx was normal [No JVD] : no jugular venous distention [No Lymphadenopathy] : no lymphadenopathy [Supple] : supple [No Respiratory Distress] : no respiratory distress  [No Accessory Muscle Use] : no accessory muscle use [Clear to Auscultation] : lungs were clear to auscultation bilaterally [Normal Rate] : normal rate  [Regular Rhythm] : with a regular rhythm [Normal S1, S2] : normal S1 and S2 [Soft] : abdomen soft [Non Tender] : non-tender [Non-distended] : non-distended [Normal Mental Status] : the patient's orientation, memory, attention, language and fund of knowledge were normal [Depressed] : depressed

## 2021-09-08 ENCOUNTER — LABORATORY RESULT (OUTPATIENT)
Age: 56
End: 2021-09-08

## 2021-09-08 LAB
ANION GAP SERPL CALC-SCNC: 13 MMOL/L
BASOPHILS # BLD AUTO: 0.03 K/UL
BASOPHILS NFR BLD AUTO: 0.6 %
BUN SERPL-MCNC: 10 MG/DL
CALCIUM SERPL-MCNC: 9.6 MG/DL
CHLORIDE SERPL-SCNC: 105 MMOL/L
CO2 SERPL-SCNC: 24 MMOL/L
CREAT SERPL-MCNC: 1.01 MG/DL
EOSINOPHIL # BLD AUTO: 0.11 K/UL
EOSINOPHIL NFR BLD AUTO: 2.2 %
GLUCOSE SERPL-MCNC: 109 MG/DL
HCT VFR BLD CALC: 37.5 %
HGB BLD-MCNC: 12.2 G/DL
IMM GRANULOCYTES NFR BLD AUTO: 0.4 %
INR PPP: 0.98 RATIO
LYMPHOCYTES # BLD AUTO: 2.09 K/UL
LYMPHOCYTES NFR BLD AUTO: 41.4 %
MAN DIFF?: NORMAL
MCHC RBC-ENTMCNC: 29.3 PG
MCHC RBC-ENTMCNC: 32.5 GM/DL
MCV RBC AUTO: 90.1 FL
MONOCYTES # BLD AUTO: 0.69 K/UL
MONOCYTES NFR BLD AUTO: 13.7 %
NEUTROPHILS # BLD AUTO: 2.11 K/UL
NEUTROPHILS NFR BLD AUTO: 41.7 %
PLATELET # BLD AUTO: 284 K/UL
POTASSIUM SERPL-SCNC: 4.6 MMOL/L
PT BLD: 11.6 SEC
RBC # BLD: 4.16 M/UL
RBC # FLD: 13.6 %
SODIUM SERPL-SCNC: 143 MMOL/L
TSH SERPL-ACNC: 0.02 UIU/ML
WBC # FLD AUTO: 5.05 K/UL

## 2021-09-09 ENCOUNTER — APPOINTMENT (OUTPATIENT)
Dept: NEUROLOGY | Facility: CLINIC | Age: 56
End: 2021-09-09
Payer: COMMERCIAL

## 2021-09-09 VITALS
WEIGHT: 150 LBS | HEIGHT: 65 IN | HEART RATE: 68 BPM | SYSTOLIC BLOOD PRESSURE: 92 MMHG | DIASTOLIC BLOOD PRESSURE: 64 MMHG | BODY MASS INDEX: 24.99 KG/M2

## 2021-09-09 DIAGNOSIS — R90.89 OTHER ABNORMAL FINDINGS ON DIAGNOSTIC IMAGING OF CENTRAL NERVOUS SYSTEM: ICD-10-CM

## 2021-09-09 DIAGNOSIS — R51.9 HEADACHE, UNSPECIFIED: ICD-10-CM

## 2021-09-09 PROCEDURE — 99215 OFFICE O/P EST HI 40 MIN: CPT

## 2021-09-09 NOTE — ASSESSMENT
[FreeTextEntry1] : Impression: This patient's presentation remains consistent with fibromyalgia.  She has a complaint of forgetfulness in this setting with normal neuropsychological testing without any evidence of cognitive impairment.  The brain MRI of 7/8/2021 revealed a punctate area in the right medial parietal lobe of abnormal diffusion.  This finding is rather nonspecific and could be consistent with lacunar infarct or else another incidental vascular anomaly.  Neurological exam is intact.\par \par Recommendations: Repeat brain MRI with and without contrast in early October 2021 to compare to the 7/8/2021 MRI.  Agree with aspirin 81 mg daily.  Medications reviewed.  Cardiac work-up in progress.  Cardiology follow-up.  Emotional support.  Neurological follow-up in 6 weeks.\par

## 2021-09-09 NOTE — REASON FOR VISIT
[Follow-Up: _____] : a [unfilled] follow-up visit [FreeTextEntry1] : Fibromyalgia forgetfulness headache abnormal brain MRI

## 2021-09-09 NOTE — HISTORY OF PRESENT ILLNESS
[FreeTextEntry1] : Rosaura Elmore seen for an office visit.  She is symptomatically unchanged regarding multifocal pain in the setting of fibromyalgia she also has headache in this setting.  She has forgetfulness.  Neuropsychological testing performed by Dr. Babb at [facility] revealed normal cognitive functioning.  There was no evidence of dementia.  The patient had a second neurological opinion regarding the abnormal brain MRI.  She has been started on aspirin 81 mg daily.  She has had cardiac follow-up.  She had an abnormal nuclear stress test and is scheduled to have a coronary angiogram.  She denies any focal neurological symptomatology consistent with CVA.

## 2021-09-13 ENCOUNTER — OUTPATIENT (OUTPATIENT)
Dept: OUTPATIENT SERVICES | Facility: HOSPITAL | Age: 56
LOS: 1 days | End: 2021-09-13
Payer: COMMERCIAL

## 2021-09-13 VITALS
OXYGEN SATURATION: 100 % | DIASTOLIC BLOOD PRESSURE: 71 MMHG | HEIGHT: 65 IN | WEIGHT: 154.98 LBS | SYSTOLIC BLOOD PRESSURE: 102 MMHG | TEMPERATURE: 99 F | HEART RATE: 61 BPM

## 2021-09-13 VITALS
DIASTOLIC BLOOD PRESSURE: 68 MMHG | SYSTOLIC BLOOD PRESSURE: 105 MMHG | RESPIRATION RATE: 16 BRPM | OXYGEN SATURATION: 99 % | HEART RATE: 64 BPM

## 2021-09-13 DIAGNOSIS — Z98.890 OTHER SPECIFIED POSTPROCEDURAL STATES: Chronic | ICD-10-CM

## 2021-09-13 DIAGNOSIS — E89.0 POSTPROCEDURAL HYPOTHYROIDISM: Chronic | ICD-10-CM

## 2021-09-13 DIAGNOSIS — R94.39 ABNORMAL RESULT OF OTHER CARDIOVASCULAR FUNCTION STUDY: ICD-10-CM

## 2021-09-13 LAB
ANION GAP SERPL CALC-SCNC: 11 MMOL/L — SIGNIFICANT CHANGE UP (ref 5–17)
BUN SERPL-MCNC: 10 MG/DL — SIGNIFICANT CHANGE UP (ref 7–23)
CALCIUM SERPL-MCNC: 9.6 MG/DL — SIGNIFICANT CHANGE UP (ref 8.4–10.5)
CHLORIDE SERPL-SCNC: 104 MMOL/L — SIGNIFICANT CHANGE UP (ref 96–108)
CO2 SERPL-SCNC: 25 MMOL/L — SIGNIFICANT CHANGE UP (ref 22–31)
CREAT SERPL-MCNC: 1 MG/DL — SIGNIFICANT CHANGE UP (ref 0.5–1.3)
GLUCOSE SERPL-MCNC: 102 MG/DL — HIGH (ref 70–99)
HCG SERPL-ACNC: 3.1 MIU/ML — SIGNIFICANT CHANGE UP
HCT VFR BLD CALC: 38.8 % — SIGNIFICANT CHANGE UP (ref 34.5–45)
HGB BLD-MCNC: 12.1 G/DL — SIGNIFICANT CHANGE UP (ref 11.5–15.5)
MCHC RBC-ENTMCNC: 28.9 PG — SIGNIFICANT CHANGE UP (ref 27–34)
MCHC RBC-ENTMCNC: 31.2 GM/DL — LOW (ref 32–36)
MCV RBC AUTO: 92.6 FL — SIGNIFICANT CHANGE UP (ref 80–100)
NRBC # BLD: 0 /100 WBCS — SIGNIFICANT CHANGE UP (ref 0–0)
PLATELET # BLD AUTO: 293 K/UL — SIGNIFICANT CHANGE UP (ref 150–400)
POTASSIUM SERPL-MCNC: 4 MMOL/L — SIGNIFICANT CHANGE UP (ref 3.5–5.3)
POTASSIUM SERPL-SCNC: 4 MMOL/L — SIGNIFICANT CHANGE UP (ref 3.5–5.3)
RBC # BLD: 4.19 M/UL — SIGNIFICANT CHANGE UP (ref 3.8–5.2)
RBC # FLD: 13.3 % — SIGNIFICANT CHANGE UP (ref 10.3–14.5)
SARS-COV-2 N GENE NPH QL NAA+PROBE: NOT DETECTED
SODIUM SERPL-SCNC: 140 MMOL/L — SIGNIFICANT CHANGE UP (ref 135–145)
WBC # BLD: 4.62 K/UL — SIGNIFICANT CHANGE UP (ref 3.8–10.5)
WBC # FLD AUTO: 4.62 K/UL — SIGNIFICANT CHANGE UP (ref 3.8–10.5)

## 2021-09-13 PROCEDURE — 99152 MOD SED SAME PHYS/QHP 5/>YRS: CPT

## 2021-09-13 PROCEDURE — 93458 L HRT ARTERY/VENTRICLE ANGIO: CPT

## 2021-09-13 PROCEDURE — 80048 BASIC METABOLIC PNL TOTAL CA: CPT

## 2021-09-13 PROCEDURE — C1894: CPT

## 2021-09-13 PROCEDURE — 84702 CHORIONIC GONADOTROPIN TEST: CPT

## 2021-09-13 PROCEDURE — C1887: CPT

## 2021-09-13 PROCEDURE — 93005 ELECTROCARDIOGRAM TRACING: CPT

## 2021-09-13 PROCEDURE — 93458 L HRT ARTERY/VENTRICLE ANGIO: CPT | Mod: 26

## 2021-09-13 PROCEDURE — 93010 ELECTROCARDIOGRAM REPORT: CPT

## 2021-09-13 PROCEDURE — 85027 COMPLETE CBC AUTOMATED: CPT

## 2021-09-13 PROCEDURE — C1769: CPT

## 2021-09-13 RX ORDER — ASPIRIN/CALCIUM CARB/MAGNESIUM 324 MG
0 TABLET ORAL
Qty: 0 | Refills: 0 | DISCHARGE

## 2021-09-13 RX ORDER — PRUCALOPRIDE 2 MG/1
1 TABLET, FILM COATED ORAL
Qty: 0 | Refills: 0 | DISCHARGE

## 2021-09-13 RX ORDER — ESCITALOPRAM OXALATE 10 MG/1
1 TABLET, FILM COATED ORAL
Qty: 0 | Refills: 0 | DISCHARGE

## 2021-09-13 NOTE — ASU DISCHARGE PLAN (ADULT/PEDIATRIC) - ASU DC SPECIAL INSTRUCTIONSFT
Wound Care:   the day AFTER your procedure remove bandage GENTLY, and clean using  mild soap and gentle warm, water stream, pat dry. leave OPEN to air. YOU MAY SHOWER   DO NOT apply lotions, creams, ointments, powder, perfumes to your incision site  DO NOT SOAK your site for 1 week ( no baths, no pools, no tubs, etc...)  Check  your groin and /or wrist daily.A small amount of bruising, and soarness are normal    ACTIVITY: for 24 hours   - DO NOT DRIVE  - DO NOT make any important decisions or sign legal documents   - DO NOT operate heavy machineries   - you may resume sexual activity in 48 hours, unless otherwise instructed by your cardiologist     If your procedure was done through the WRIST: for the NEXT 3DAYS:  - avoid pushing, pulling, with that affected wrist   - avoid repeated movement of that hand and wrist ( eg: typing, hammering)  - DO NOT LIFT anything more than 5 lbs     If your procedure was done through the GROIN: for the NEXT 5 DAYS  - Limit climbing stairs, DO NOT soak in bathtub or pool  - no strenuous activities, pushing, pulling, straining  - Do not lift anything 10lbs or heavier     MEDICATION:   take your medications as explained ( see discharge paperwork)   If you received a STENT, you will be taking antiplatelet medications to KEEP YOUR STENT OPEN ( eg: Aspirin, Plavix, Brilinta, Effient, etc).  Take as prescribed DO NOT STOP taking them without consulting with your cardiologist first.     Follow heart healthy diet recommended by your doctor, , if you smoke STOP SMOKING ( may call [phone] for center of tobacco control if you need assistance)     CALL your doctor to make appointment in 2 WEEKS     ***CALL YOUR DOCTOR***  if you experience: fever, chills, body aches, or severe pain, swelling, redness, heat or yellow discharge at incision site  If you experience Bleeding or excruciating pain at the procedural site, swelling ( golf ball size) at your procedural site  If you experience CHEST PAIN  If you experience extremity numbness, tingling, temperature change ( of your procedural site)   If you are unable to reach your doctor, you may contact:   -Cardiology Office at [facility] at [phone] or   - [facility] [phone]  - [facility] [phone]

## 2021-09-13 NOTE — ASU PATIENT PROFILE, ADULT - NSICDXPASTMEDICALHX_GEN_ALL_CORE_FT
PAST MEDICAL HISTORY:  Anxiety     Depression     Fibromyalgia     History of gait disorder     Hypothyroidism     Lacunar infarction

## 2021-09-13 NOTE — H&P CARDIOLOGY - NSICDXPASTSURGICALHX_GEN_ALL_CORE_FT
PAST SURGICAL HISTORY:  H/O prior ablation treatment     H/O shoulder surgery     H/O thyroidectomy     S/P nasal surgery

## 2021-09-13 NOTE — H&P CARDIOLOGY - NSICDXPASTMEDICALHX_GEN_ALL_CORE_FT
PAST MEDICAL HISTORY:  Anxiety     Depression     Fibromyalgia     History of gait disorder     Hypothyroidism     Lacunar infarction      PAST MEDICAL HISTORY:  Anxiety     Depression     Fibromyalgia     History of celiac disease     History of gait disorder     Hypothyroidism     Lacunar infarction     Thyroid cancer

## 2021-09-13 NOTE — H&P CARDIOLOGY - HISTORY OF PRESENT ILLNESS
This is a 56 yo female with history of           referring MD Dr. Ankush Simental    This is a 56 yo female with history of lacunar infact on MRI, chest discomfort, fibromyalgia, gait disorder, hypothyroidism, anxiety and depression had a abnormal nuclear stress test as outpatient presents for cardiac cath.        echo showed EF 63% with Mild MR   referring MD Dr. Ankush Simental    This is a 56 yo female with history of lacunar infarct on MRI 7/8/2021 (seen by Dr. Graham who suspects findings are non specific and possibly another vascular anomaly), chest discomfort, fibromyalgia, gait disorder, hypothyroidism, anxiety and depression had a abnormal nuclear stress test as outpatient presents for cardiac cath. Pt states she has had chest pains over the last few years which she attributed to her fibromyalgia,   denies implanted cardiac monitors   echo showed EF 63% with Mild MR   referring MD Dr. Ankush Simental

## 2021-09-13 NOTE — ASU DISCHARGE PLAN (ADULT/PEDIATRIC) - CARE PROVIDER_API CALL
Ankush Simental)  Cardiology; Internal Medicine  [address], [address]  [place] [address]  Phone: [phone]  Fax: [phone]  Follow Up Time:

## 2021-09-16 NOTE — HEALTH RISK ASSESSMENT
[] : No [No] : No [No falls in past year] : Patient reported no falls in the past year [0] : 2) Feeling down, depressed, or hopeless: Not at all (0) [[id number]] : 0 Oculoplastic Surgeon Procedure Text (E): After obtaining clear surgical margins the patient was sent to oculoplastics for surgical repair.  The patient understands they will receive post-surgical care and follow-up from the referring physician's office.

## 2021-09-22 PROBLEM — C73 MALIGNANT NEOPLASM OF THYROID GLAND: Chronic | Status: ACTIVE | Noted: 2021-09-13

## 2021-09-22 PROBLEM — F32.9 MAJOR DEPRESSIVE DISORDER, SINGLE EPISODE, UNSPECIFIED: Chronic | Status: ACTIVE | Noted: 2021-09-13

## 2021-09-22 PROBLEM — F41.9 ANXIETY DISORDER, UNSPECIFIED: Chronic | Status: ACTIVE | Noted: 2021-09-13

## 2021-09-22 PROBLEM — I63.81 OTHER CEREBRAL INFARCTION DUE TO OCCLUSION OR STENOSIS OF SMALL ARTERY: Chronic | Status: ACTIVE | Noted: 2021-09-13

## 2021-09-22 PROBLEM — M79.7 FIBROMYALGIA: Chronic | Status: ACTIVE | Noted: 2021-09-13

## 2021-09-22 PROBLEM — Z87.19 PERSONAL HISTORY OF OTHER DISEASES OF THE DIGESTIVE SYSTEM: Chronic | Status: ACTIVE | Noted: 2021-09-13

## 2021-09-22 PROBLEM — Z87.898 PERSONAL HISTORY OF OTHER SPECIFIED CONDITIONS: Chronic | Status: ACTIVE | Noted: 2021-09-13

## 2021-09-22 PROBLEM — E03.9 HYPOTHYROIDISM, UNSPECIFIED: Chronic | Status: ACTIVE | Noted: 2021-09-13

## 2021-09-23 ENCOUNTER — APPOINTMENT (OUTPATIENT)
Dept: RHEUMATOLOGY | Facility: CLINIC | Age: 56
End: 2021-09-23
Payer: COMMERCIAL

## 2021-09-23 VITALS
BODY MASS INDEX: 25.33 KG/M2 | OXYGEN SATURATION: 98 % | RESPIRATION RATE: 15 BRPM | DIASTOLIC BLOOD PRESSURE: 70 MMHG | TEMPERATURE: 97.5 F | HEIGHT: 65 IN | SYSTOLIC BLOOD PRESSURE: 110 MMHG | HEART RATE: 89 BPM | WEIGHT: 152 LBS

## 2021-09-23 PROCEDURE — 99213 OFFICE O/P EST LOW 20 MIN: CPT

## 2021-09-23 RX ORDER — GABAPENTIN 100 %
POWDER (GRAM) MISCELLANEOUS
Qty: 60 | Refills: 0 | Status: DISCONTINUED | COMMUNITY
Start: 2021-09-08

## 2021-09-23 RX ORDER — AMITRIPTYLINE HYDROCHLORIDE 25 MG/1
25 TABLET, FILM COATED ORAL
Qty: 90 | Refills: 0 | Status: DISCONTINUED | COMMUNITY
Start: 2021-05-03 | End: 2021-09-23

## 2021-09-23 NOTE — REASON FOR VISIT
[Follow-Up: _____] : a [unfilled] follow-up visit [Initial Evaluation] : an initial evaluation [FreeTextEntry1] : FMS

## 2021-09-23 NOTE — REVIEW OF SYSTEMS
[Feeling Poorly] : feeling poorly [Feeling Tired] : feeling tired [Dry Eyes] : dryness of the eyes [Arthralgias] : arthralgias [Joint Pain] : joint pain [As Noted in HPI] : as noted in HPI [Sleep Disturbances] : sleep disturbances [Anxiety] : anxiety [Depression] : depression [Negative] : Heme/Lymph [Joint Swelling] : no joint swelling [Joint Stiffness] : no joint stiffness [Suicidal] : not suicidal

## 2021-09-23 NOTE — ASSESSMENT
[FreeTextEntry1] : FAISAL MOFFETT is a 56 year old woman with prior dx of FMS which I agree with given sx as above and exam with diffuse soft tissue tender points, skin hypersensitivity, appearing fatigued. Pt also with mild dry eyes and dry mouth chronically, slightly imbalanced gait on exam today and with hx of issues with balance and falls. Remains with paucity of inflammatory arthritis or CTD sx today. \par \par Urgent visit for markedly worse FMS sx in setting of emotional/physical stress, not able to increase current meds 2/2 sedation. \par \par - will trial low dose prednisone short term to see if can help with pain and energy. 5-10mg/d prn with food. Avoid NSAIDs while on it. Risks and benefits of steroids were d/w patient including but not limited to weight gain, diabetes, HTN, avascular necrosis, osteoporosis, glaucoma, cataract, infections and immunosuppression.\par - c/w current regimen of medications at present without dosing changes. \par - discussed again potential retrial of gabapentin instead of Lyrica or trial of Savella if above not effective \par - c.w stretching, light aerobic exercises, massage, heat as adjuncts for FMS pain. \par - c/w nonpharmacologic FMS therapies as well - encouraged to incorporate small activities that she finds beneficial during her day, optimize stretching and light exercise, and be mindful of mood, be aware not to overextend on days she has less pain, ensure adequate rest between strenuous activities. \par - endo f/u for ongoing TFT abnormalities \par - RTC in 1 month

## 2021-09-23 NOTE — HISTORY OF PRESENT ILLNESS
[FreeTextEntry1] : FAISAL MOFFETT is a 56 year old woman who presents with hx of FMS, here for transition rheumatologic care, previously following with Dr Maldonado. Symptoms include chronic headaches, persistent fatigue, gait instability, frequent falls, forgetfulness, diffuse arthralgias, myalgias, skin hypersensitivity, depression/anxiety/panic attacks. Symptoms remain fairly active despite current regimen of medications but she reports she is markedly improved from prior to being on this regimen. Adjunctive measures and other related sx as below -- \par \par + swimming daily which helps -- notes cold temperatures are very beneficial for her \par + Trigger injections, chiropractor helping -- Roe Ulloa\par + low pressure ?glaucoma and dry eyes -- getting plugs, on Timoptic, Genteal with moderate improvement \par + chest pain, cardiac w/u negative, likely ?panic attacks\par + no periods since ablation in 2014 -- ?due for DEXA with GYN, reports last one was normal \par + chronic bloating and nonspecific GI sx, reports last colonoscopy normal but does have bouts of diverticulitis \par + Has been on disability x 1 year due to inability to work 2/2 above\par + 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 APLS ROS -  2 uncomplicated pregnancies, 2 miscarriage, no thrombotic events.  \par \par Inflammatory arthritis ROS negative for symmetrical peripheral joint synovitis, prolonged AM stiffness, enthesitis, dactylitis, psoriasis/ rashes, eye inflammation, inflammatory low back pain, IBD. \par \par Prior/failed meds -- Cymbalta 2/2 SE, gabapentin but not sure why it was stopped \par -------\par 9/23/21 -- Worsening FMS sx recently in setting of increased emotional and physical stress -- having to clean out flooded basement mainly herself. Ongoing CP, full cardiac w/u negative, likely FMS related as well. Current meds are partially helping but reluctant to increase 2/2 sedation.

## 2021-09-29 ENCOUNTER — APPOINTMENT (OUTPATIENT)
Dept: CARDIOLOGY | Facility: CLINIC | Age: 56
End: 2021-09-29
Payer: COMMERCIAL

## 2021-09-29 ENCOUNTER — NON-APPOINTMENT (OUTPATIENT)
Age: 56
End: 2021-09-29

## 2021-09-29 VITALS — HEART RATE: 76 BPM | DIASTOLIC BLOOD PRESSURE: 67 MMHG | SYSTOLIC BLOOD PRESSURE: 120 MMHG

## 2021-09-29 VITALS
BODY MASS INDEX: 24.83 KG/M2 | RESPIRATION RATE: 16 BRPM | OXYGEN SATURATION: 98 % | SYSTOLIC BLOOD PRESSURE: 123 MMHG | TEMPERATURE: 97 F | WEIGHT: 149 LBS | HEIGHT: 65 IN | HEART RATE: 76 BPM | DIASTOLIC BLOOD PRESSURE: 78 MMHG

## 2021-09-29 DIAGNOSIS — I63.81 OTHER CEREBRAL INFARCTION DUE TO OCCLUSION OR STENOSIS OF SMALL ARTERY: ICD-10-CM

## 2021-09-29 PROCEDURE — 93000 ELECTROCARDIOGRAM COMPLETE: CPT

## 2021-09-29 PROCEDURE — 99214 OFFICE O/P EST MOD 30 MIN: CPT

## 2021-09-29 NOTE — PHYSICAL EXAM
[Well Developed] : well developed [Well Nourished] : well nourished [No Acute Distress] : no acute distress [Normal Conjunctiva] : normal conjunctiva [Normal Venous Pressure] : normal venous pressure [No Carotid Bruit] : no carotid bruit [Normal S1, S2] : normal S1, S2 [No Murmur] : no murmur [No Rub] : no rub [No Gallop] : no gallop [Clear Lung Fields] : clear lung fields [Good Air Entry] : good air entry [No Respiratory Distress] : no respiratory distress  [Soft] : abdomen soft [Non Tender] : non-tender [No Masses/organomegaly] : no masses/organomegaly [Normal Bowel Sounds] : normal bowel sounds [Abnormal Gait] : abnormal gait [No Edema] : no edema [No Cyanosis] : no cyanosis [No Clubbing] : no clubbing [No Varicosities] : no varicosities [No Rash] : no rash [No Skin Lesions] : no skin lesions [Moves all extremities] : moves all extremities [No Focal Deficits] : no focal deficits [Normal Speech] : normal speech [Alert and Oriented] : alert and oriented [Normal memory] : normal memory [de-identified] : patient unable to ambulate on treadmill/frequent falls

## 2021-09-29 NOTE — ASSESSMENT
[FreeTextEntry1] : Impression: \par 1. pt with atypical  chest pain palpitations hyperthyroidism fibromyalgia and small lacunar stroke.\par \par Plan:\par 1. Will discuss ILR with EP service

## 2021-09-29 NOTE — REASON FOR VISIT
[Symptom and Test Evaluation] : symptom and test evaluation [FreeTextEntry1] : Patient returns for followup. A cardiac catheter was negative for CAD. She remains concerned over the etiology of her stroke. Although it was a lacunar infarct she has no known history of hypertension and has been relatively hypotensive. She does complain of palpitations which are on explained. She is amenable to getting an ILR

## 2021-10-04 RX ORDER — LEVOTHYROXINE SODIUM 0.12 MG/1
125 TABLET ORAL DAILY
Qty: 90 | Refills: 3 | Status: DISCONTINUED | COMMUNITY
Start: 2021-03-26 | End: 2021-10-04

## 2021-10-26 ENCOUNTER — APPOINTMENT (OUTPATIENT)
Dept: ENDOCRINOLOGY | Facility: CLINIC | Age: 56
End: 2021-10-26
Payer: COMMERCIAL

## 2021-10-26 VITALS
SYSTOLIC BLOOD PRESSURE: 100 MMHG | OXYGEN SATURATION: 94 % | HEART RATE: 115 BPM | TEMPERATURE: 97.8 F | DIASTOLIC BLOOD PRESSURE: 78 MMHG | WEIGHT: 150 LBS | HEIGHT: 65 IN | BODY MASS INDEX: 24.99 KG/M2

## 2021-10-26 PROCEDURE — 99205 OFFICE O/P NEW HI 60 MIN: CPT

## 2021-10-27 ENCOUNTER — APPOINTMENT (OUTPATIENT)
Dept: CARDIOLOGY | Facility: CLINIC | Age: 56
End: 2021-10-27
Payer: COMMERCIAL

## 2021-10-27 ENCOUNTER — NON-APPOINTMENT (OUTPATIENT)
Age: 56
End: 2021-10-27

## 2021-10-27 VITALS
RESPIRATION RATE: 20 BRPM | OXYGEN SATURATION: 98 % | DIASTOLIC BLOOD PRESSURE: 74 MMHG | HEIGHT: 66 IN | BODY MASS INDEX: 24.43 KG/M2 | WEIGHT: 152 LBS | HEART RATE: 80 BPM | SYSTOLIC BLOOD PRESSURE: 109 MMHG | TEMPERATURE: 97.2 F

## 2021-10-27 PROCEDURE — 93000 ELECTROCARDIOGRAM COMPLETE: CPT

## 2021-10-27 PROCEDURE — 99205 OFFICE O/P NEW HI 60 MIN: CPT

## 2021-10-27 NOTE — HISTORY OF PRESENT ILLNESS
[FreeTextEntry1] : Ms. Rosaura Elmore was seen in the [facility] Electrophysiology [facility] today. 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 She was found on routine brain MRI to have an incidental lacunar infarct. There were no associated symptoms. She is referred now for ILR. She endorses regular palpitations that come and go of their own accord.\par \par Ms. Elmore denies any recent history of chest pain, shortness of breath, palpitations, dizziness, or syncope.\par

## 2021-10-27 NOTE — DISCUSSION/SUMMARY
[FreeTextEntry1] : In summary, this is a 56 year old woman with incidental lacunar stroke. We discussed that atrial fibrillation has never been clearly implicated in lacunar stroke, though recent data suggests an increased incidence of AF in individuals with lacunar CVA. In light of her palpitations she would still nicholas to have longterm rhythm monitoring in place.\par \par Risks/benefits of bleeding and infection were explained.\par \par Ms. Elmore appeared to understand the whole discussion and verbalized that all of her questions were answered to her satisfaction.\par \par Thank you for allowing me to be involved in the care of this pleasant woman. Please feel free to contact me with any questions.\par \par \par Harman Vargas MD\par  of Cardiology\par Electrophysiology Section\[address], [address] [place] [address]\[place] [address]\par Office: [phone]\par Fax: [phone]\par

## 2021-10-29 ENCOUNTER — APPOINTMENT (OUTPATIENT)
Dept: RHEUMATOLOGY | Facility: CLINIC | Age: 56
End: 2021-10-29
Payer: COMMERCIAL

## 2021-10-29 ENCOUNTER — APPOINTMENT (OUTPATIENT)
Dept: MRI IMAGING | Facility: HOSPITAL | Age: 56
End: 2021-10-29
Payer: COMMERCIAL

## 2021-10-29 ENCOUNTER — OUTPATIENT (OUTPATIENT)
Dept: OUTPATIENT SERVICES | Facility: HOSPITAL | Age: 56
LOS: 1 days | End: 2021-10-29
Payer: COMMERCIAL

## 2021-10-29 VITALS
RESPIRATION RATE: 15 BRPM | DIASTOLIC BLOOD PRESSURE: 72 MMHG | WEIGHT: 153 LBS | BODY MASS INDEX: 24.59 KG/M2 | HEIGHT: 66 IN | OXYGEN SATURATION: 97 % | SYSTOLIC BLOOD PRESSURE: 110 MMHG | HEART RATE: 84 BPM | TEMPERATURE: 97.4 F

## 2021-10-29 DIAGNOSIS — R90.89 OTHER ABNORMAL FINDINGS ON DIAGNOSTIC IMAGING OF CENTRAL NERVOUS SYSTEM: ICD-10-CM

## 2021-10-29 DIAGNOSIS — M79.7 FIBROMYALGIA: ICD-10-CM

## 2021-10-29 DIAGNOSIS — E89.0 POSTPROCEDURAL HYPOTHYROIDISM: Chronic | ICD-10-CM

## 2021-10-29 DIAGNOSIS — Z98.890 OTHER SPECIFIED POSTPROCEDURAL STATES: Chronic | ICD-10-CM

## 2021-10-29 DIAGNOSIS — R68.89 OTHER GENERAL SYMPTOMS AND SIGNS: ICD-10-CM

## 2021-10-29 PROCEDURE — 70553 MRI BRAIN STEM W/O & W/DYE: CPT | Mod: 26

## 2021-10-29 PROCEDURE — A9579: CPT

## 2021-10-29 PROCEDURE — 70553 MRI BRAIN STEM W/O & W/DYE: CPT

## 2021-10-29 PROCEDURE — 99214 OFFICE O/P EST MOD 30 MIN: CPT

## 2021-10-31 NOTE — ASSESSMENT
[FreeTextEntry1] : 56 year old female with history of thyroid cancer ( path not available) s/p thyroidectomy in 2006 and MADRID in 2007. \par Tg in 02/2021 was <0.2 \par Neck US over 10 years ago \par  \par -Will check Tg, TSH, FT4 at this time \par -Continue Levothyroxine 112 mcg daily \par -Will order neck US\par -Patient will send over path reports from before \par -Given history of heart palpitations, likely will consider TSH of 0.5-2.0 \par -Follow up in 6 months

## 2021-10-31 NOTE — HISTORY OF PRESENT ILLNESS
[FreeTextEntry1] : 56 year old female w/ history of fibromyalgia, depression with history of thyroid cancer here for follow up \par \par 2006 - History of thyroidectomy ( Pathology is unclear) performed at [facility] with Dr. Claros \par 2007: MADRID administered \par \par She has not had an ultrasound of the neck in 10 years \par WBS was performed post therapy in 2012 and was negative for any uptake \par \par 01/2021: Tg was <0.2 \par \par -She reported having heart palpitations\par -Severe constipation \par -No hair loss \par -No tremors \par \par \par \par She is currently on levothyroxine 100 mcg daily \par \par Previously on levothyroxine 200 mcg now decreased down to 100 mcg daily \par

## 2021-10-31 NOTE — REVIEW OF SYSTEMS
[Fatigue] : no fatigue [Decreased Appetite] : appetite not decreased [Recent Weight Gain (___ Lbs)] : no recent weight gain [Recent Weight Loss (___ Lbs)] : no recent weight loss [Visual Field Defect] : no visual field defect [Dry Eyes] : no dryness [Dysphagia] : no dysphagia [Neck Pain] : no neck pain [Dysphonia] : no dysphonia [Nasal Congestion] : no nasal congestion [Chest Pain] : no chest pain [Slow Heart Rate] : heart rate is not slow [Fast Heart Rate] : heart rate is not fast [Palpitations] : no palpitations [Shortness Of Breath] : no shortness of breath [Cough] : no cough [Nausea] : no nausea [Constipation] : no constipation [Vomiting] : no vomiting [Diarrhea] : no diarrhea [Polyuria] : no polyuria [Irregular Menses] : regular menses [Joint Pain] : no joint pain [Muscle Weakness] : no muscle weakness [Acanthosis] : no acanthosis  [Acne] : no acne [Headaches] : no headaches [Dizziness] : no dizziness [Tremors] : no tremors [Depression] : no depression [Polydipsia] : no polydipsia [Cold Intolerance] : no cold intolerance [Easy Bleeding] : no ~M tendency for easy bleeding [Easy Bruising] : no tendency for easy bruising

## 2021-11-03 ENCOUNTER — NON-APPOINTMENT (OUTPATIENT)
Age: 56
End: 2021-11-03

## 2021-11-03 LAB — SARS-COV-2 N GENE NPH QL NAA+PROBE: NOT DETECTED

## 2021-11-05 ENCOUNTER — OUTPATIENT (OUTPATIENT)
Dept: OUTPATIENT SERVICES | Facility: HOSPITAL | Age: 56
LOS: 1 days | End: 2021-11-05
Payer: COMMERCIAL

## 2021-11-05 VITALS
HEART RATE: 75 BPM | HEIGHT: 65 IN | WEIGHT: 149.91 LBS | OXYGEN SATURATION: 99 % | TEMPERATURE: 99 F | DIASTOLIC BLOOD PRESSURE: 71 MMHG | RESPIRATION RATE: 16 BRPM | SYSTOLIC BLOOD PRESSURE: 109 MMHG

## 2021-11-05 VITALS
SYSTOLIC BLOOD PRESSURE: 104 MMHG | HEART RATE: 78 BPM | DIASTOLIC BLOOD PRESSURE: 62 MMHG | OXYGEN SATURATION: 98 % | RESPIRATION RATE: 18 BRPM

## 2021-11-05 DIAGNOSIS — E89.0 POSTPROCEDURAL HYPOTHYROIDISM: Chronic | ICD-10-CM

## 2021-11-05 DIAGNOSIS — Z98.890 OTHER SPECIFIED POSTPROCEDURAL STATES: Chronic | ICD-10-CM

## 2021-11-05 DIAGNOSIS — I63.9 CEREBRAL INFARCTION, UNSPECIFIED: ICD-10-CM

## 2021-11-05 PROCEDURE — 33285 INSJ SUBQ CAR RHYTHM MNTR: CPT

## 2021-11-05 PROCEDURE — C1764: CPT

## 2021-11-05 RX ORDER — LEVOTHYROXINE SODIUM 125 MCG
1 TABLET ORAL
Qty: 0 | Refills: 0 | DISCHARGE

## 2021-11-05 RX ORDER — POLYETHYLENE GLYCOL 3350 17 G/17G
0 POWDER, FOR SOLUTION ORAL
Qty: 0 | Refills: 0 | DISCHARGE

## 2021-11-05 RX ORDER — CYCLOBENZAPRINE HYDROCHLORIDE 10 MG/1
1 TABLET, FILM COATED ORAL
Qty: 0 | Refills: 0 | DISCHARGE

## 2021-11-05 RX ORDER — AMITRIPTYLINE HCL 25 MG
1 TABLET ORAL
Qty: 0 | Refills: 0 | DISCHARGE

## 2021-11-05 NOTE — ASU DISCHARGE PLAN (ADULT/PEDIATRIC) - ASU DC SPECIAL INSTRUCTIONSFT
Continue with follow-up visits to your electrophysiology team and with your home remote device monitoring (if applicable). Continue your medications as prescribed.

## 2021-11-05 NOTE — ASU DISCHARGE PLAN (ADULT/PEDIATRIC) - CARE PROVIDER_API CALL
Harman Vargas)  Cardiac Electrophysiology; Cardiology  [address] [place] [address]  Phone: [phone]  Fax: [phone]  Follow Up Time:   
No

## 2021-11-06 ENCOUNTER — NON-APPOINTMENT (OUTPATIENT)
Age: 56
End: 2021-11-06

## 2021-11-08 ENCOUNTER — APPOINTMENT (OUTPATIENT)
Dept: ELECTROPHYSIOLOGY | Facility: CLINIC | Age: 56
End: 2021-11-08

## 2021-11-08 ENCOUNTER — APPOINTMENT (OUTPATIENT)
Dept: NEUROLOGY | Facility: CLINIC | Age: 56
End: 2021-11-08

## 2021-11-12 ENCOUNTER — APPOINTMENT (OUTPATIENT)
Dept: ELECTROPHYSIOLOGY | Facility: CLINIC | Age: 56
End: 2021-11-12

## 2021-11-17 ENCOUNTER — APPOINTMENT (OUTPATIENT)
Dept: ELECTROPHYSIOLOGY | Facility: CLINIC | Age: 56
End: 2021-11-17

## 2021-11-28 ENCOUNTER — TRANSCRIPTION ENCOUNTER (OUTPATIENT)
Age: 56
End: 2021-11-28

## 2021-11-28 LAB
T4 FREE SERPL-MCNC: 1.2 NG/DL
THYROGLOB AB SERPL-ACNC: <20 IU/ML
THYROGLOB SERPL-MCNC: <0.2 NG/ML
TSH SERPL-ACNC: 0.09 UIU/ML

## 2021-11-29 NOTE — ASSESSMENT
[FreeTextEntry1] : FAISAL MOFFETT is a 56 year old woman with prior dx of FMS which I agree with given sx as above and exam with diffuse soft tissue tender points, skin hypersensitivity, appearing fatigued. Pt also with mild dry eyes and dry mouth chronically, slightly imbalanced gait and with hx of issues with balance and falls. Remains with paucity of inflammatory arthritis or CTD sx today. Responsive to low dose prednisone for markedly worse FMS sx but other medical issues making it not an ideal long term med. Not being able to increase current meds 2/2 sedation. \par \par - taper off prednisone now - 5mg EOD x 6 days, then stop. Reserve remaining and can use PRN sparingly for severe flares \par - c/w current regimen of medications at present without dosing changes. \par - discussed retrial of gabapentin instead of Lyrica or trial of Savella -- she will consider \par - c/w stretching, light aerobic exercises, massage, heat as adjuncts for FMS pain. \par - c/w nonpharmacologic FMS therapies as well - encouraged to incorporate small activities that she finds beneficial during her day, optimize stretching and light exercise, and be mindful of mood, be aware not to overextend on days she has less pain, ensure adequate rest between strenuous activities. \par - disability forms completed for her today \par - RTC in 2 months

## 2021-11-29 NOTE — PHYSICAL EXAM
[General Appearance - Alert] : alert [General Appearance - In No Acute Distress] : in no acute distress [General Appearance - Well Nourished] : well nourished [Sclera] : the sclera and conjunctiva were normal [PERRL With Normal Accommodation] : pupils were equal in size, round, and reactive to light [Extraocular Movements] : extraocular movements were intact [Outer Ear] : the ears and nose were normal in appearance [Nasal Cavity] : the nasal mucosa and septum were normal [Oropharynx] : the oropharynx was normal [Neck Appearance] : the appearance of the neck was normal [Auscultation Breath Sounds / Voice Sounds] : lungs were clear to auscultation bilaterally [Heart Rate And Rhythm] : heart rate was normal and rhythm regular [Heart Sounds] : normal S1 and S2 [Murmurs] : no murmurs [Edema] : there was no peripheral edema [Abdomen Soft] : soft [Abdomen Tenderness] : non-tender [No CVA Tenderness] : no ~M costovertebral angle tenderness [No Spinal Tenderness] : no spinal tenderness [Nail Clubbing] : no clubbing  or cyanosis of the fingernails [Musculoskeletal - Swelling] : no joint swelling seen [Motor Tone] : muscle strength and tone were normal [Motor Exam] : the motor exam was normal [No Focal Deficits] : no focal deficits [Oriented To Time, Place, And Person] : oriented to person, place, and time [Impaired Insight] : insight and judgment were intact [Affect] : the affect was normal [] : no rash [FreeTextEntry1] : appears fatigued

## 2021-11-29 NOTE — HISTORY OF PRESENT ILLNESS
[FreeTextEntry1] : FAISAL MOFFETT is a 56 year old woman who presents with hx of FMS, here for transition rheumatologic care, previously following with Dr Maldonado. Symptoms include chronic headaches, persistent fatigue, gait instability, frequent falls, forgetfulness, diffuse arthralgias, myalgias, skin hypersensitivity, depression/anxiety/panic attacks. Symptoms remain fairly active despite current regimen of medications but she reports she is markedly improved from prior to being on this regimen. Adjunctive measures and other related sx as below -- \par \par + swimming daily which helps -- notes cold temperatures are very beneficial for her \par + Trigger injections, chiropractor helping -- Roe Ulloa\par + low pressure ?glaucoma and dry eyes -- getting plugs, on Timoptic, Genteal with moderate improvement \par + chest pain, cardiac w/u negative, likely ?panic attacks\par + no periods since ablation in 2014 -- ?due for DEXA with GYN, reports last one was normal \par + chronic bloating and nonspecific GI sx, reports last colonoscopy normal but does have bouts of diverticulitis \par + Has been on disability x 1 year due to inability to work 2/2 above\par + 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 APLS ROS -  2 uncomplicated pregnancies, 2 miscarriage, no thrombotic events.  \par \par Inflammatory arthritis ROS negative for symmetrical peripheral joint synovitis, prolonged AM stiffness, enthesitis, dactylitis, psoriasis/ rashes, eye inflammation, inflammatory low back pain, IBD. \par \par Prior/failed meds -- Cymbalta 2/2 SE, gabapentin but not sure why it was stopped \par -------\par 9/23/21 -- Worsening FMS sx recently in setting of increased emotional and physical stress -- having to clean out flooded basement mainly herself. Ongoing CP, full cardiac w/u negative, likely FMS related as well. Current meds are partially helping but reluctant to increase 2/2 sedation. \par \par 10/29/21 -- Prednisone helped, but does not want to be on it chronically, no SE. Remainder of meds are maintaining her at a pain level of 6 at best. More frequent migraines, getting MRI brain today. Also with poorly managed glaucoma, following closely with ophthalmology.

## 2021-11-30 ENCOUNTER — APPOINTMENT (OUTPATIENT)
Dept: PSYCHIATRY | Facility: CLINIC | Age: 56
End: 2021-11-30
Payer: COMMERCIAL

## 2021-11-30 DIAGNOSIS — R68.89 OTHER GENERAL SYMPTOMS AND SIGNS: ICD-10-CM

## 2021-11-30 PROCEDURE — 99205 OFFICE O/P NEW HI 60 MIN: CPT

## 2021-12-01 ENCOUNTER — APPOINTMENT (OUTPATIENT)
Dept: FAMILY MEDICINE | Facility: CLINIC | Age: 56
End: 2021-12-01
Payer: COMMERCIAL

## 2021-12-01 VITALS
HEIGHT: 66 IN | DIASTOLIC BLOOD PRESSURE: 70 MMHG | WEIGHT: 160 LBS | BODY MASS INDEX: 25.71 KG/M2 | RESPIRATION RATE: 15 BRPM | HEART RATE: 90 BPM | OXYGEN SATURATION: 96 % | TEMPERATURE: 96.8 F | SYSTOLIC BLOOD PRESSURE: 110 MMHG

## 2021-12-01 DIAGNOSIS — F41.9 ANXIETY DISORDER, UNSPECIFIED: ICD-10-CM

## 2021-12-01 DIAGNOSIS — R00.2 PALPITATIONS: ICD-10-CM

## 2021-12-01 DIAGNOSIS — F32.A ANXIETY DISORDER, UNSPECIFIED: ICD-10-CM

## 2021-12-01 PROCEDURE — 90686 IIV4 VACC NO PRSV 0.5 ML IM: CPT

## 2021-12-01 PROCEDURE — 99214 OFFICE O/P EST MOD 30 MIN: CPT | Mod: 25

## 2021-12-01 PROCEDURE — G0008: CPT

## 2021-12-01 RX ORDER — LEVOTHYROXINE SODIUM 0.1 MG/1
100 TABLET ORAL DAILY
Qty: 14 | Refills: 0 | Status: DISCONTINUED | COMMUNITY
Start: 2021-09-08 | End: 2021-12-01

## 2021-12-01 NOTE — PHYSICAL EXAM
[No Acute Distress] : no acute distress [Well Developed] : well developed [Looks Tired] : appears tired [Normal Sclera/Conjunctiva] : normal sclera/conjunctiva [PERRL] : pupils equal round and reactive to light [EOMI] : extraocular movements intact [No Respiratory Distress] : no respiratory distress  [Soft] : abdomen soft [Non Tender] : non-tender [Non-distended] : non-distended [Normal Mental Status] : the patient's orientation, memory, attention, language and fund of knowledge were normal [Depressed] : depressed

## 2021-12-01 NOTE — HISTORY OF PRESENT ILLNESS
[FreeTextEntry1] : follow up for chronic conditions - fibromyalgia, lacunar infarct, hypothyroidism [de-identified] : 56 yo female with h/o fibromyalgia, hypothyroid (thyroid cancer s/p MADRID), and recent finding of lacunar infarct on brain MRI presents for follow up. Cardiac cath was negative and has since had an implantable loop recorder placed. She is followed by Dr. Simental, Dr. Graham, Dr. Vargas (), Dr. Toney, and Dr. Chapa. Recently started on prednisone by rheumatology for worsening FMS pain.\par \par Had nerve block at [facility] at chiropractors office \par Psychiatry would like to decrease amitriptyline dosage and switch to milnacipran. Patient is hesitant to switch because she feels like it has taken a long time to get a regimen that works.\par \par [ ] flu shot --> agreed to today\par [ ] Colonoscopy due --> prefers to do FIT at this time \par [ ] mammo - had in August with [facility], will be getting MR and US January \par [ ] pap - done with Dr. Lerner\par

## 2021-12-08 ENCOUNTER — NON-APPOINTMENT (OUTPATIENT)
Age: 56
End: 2021-12-08

## 2021-12-09 ENCOUNTER — APPOINTMENT (OUTPATIENT)
Dept: FAMILY MEDICINE | Facility: CLINIC | Age: 56
End: 2021-12-09
Payer: COMMERCIAL

## 2021-12-09 VITALS
DIASTOLIC BLOOD PRESSURE: 80 MMHG | RESPIRATION RATE: 16 BRPM | HEART RATE: 114 BPM | OXYGEN SATURATION: 100 % | SYSTOLIC BLOOD PRESSURE: 127 MMHG | TEMPERATURE: 97.5 F

## 2021-12-09 VITALS — HEART RATE: 104 BPM

## 2021-12-09 PROCEDURE — 99213 OFFICE O/P EST LOW 20 MIN: CPT

## 2021-12-09 NOTE — PHYSICAL EXAM
[No Respiratory Distress] : no respiratory distress  [Normal] : affect was normal and insight and judgment were intact [de-identified] : diffuse erythematous linear papules of hands and arms w/ some exoriation. Upton of erythematous papules on buttock (left).

## 2021-12-09 NOTE — HISTORY OF PRESENT ILLNESS
[FreeTextEntry8] : cc: poison ivy\par \par 54 yo female with h/o fibromyalgia, hypothyroid (thyroid cancer s/p MADRID), and recent finding of lacunar infarct on brain MRI presents for poison ivy. She was feeling better after her nerve block last week and did some gardening. The next day she felt intense itching and a rash developed on her arms and buttock. She started taking benedryl after the onset. She contacted the office on 12/6 due to persistent symptoms for which prednisone PO and topical were prescribed. She presents today because of persistent intense pruritis at affected sites. Otherwise well.

## 2021-12-14 ENCOUNTER — NON-APPOINTMENT (OUTPATIENT)
Age: 56
End: 2021-12-14

## 2021-12-14 ENCOUNTER — APPOINTMENT (OUTPATIENT)
Dept: ELECTROPHYSIOLOGY | Facility: CLINIC | Age: 56
End: 2021-12-14
Payer: COMMERCIAL

## 2021-12-14 PROCEDURE — 93298 REM INTERROG DEV EVAL SCRMS: CPT

## 2021-12-14 PROCEDURE — G2066: CPT

## 2021-12-23 ENCOUNTER — APPOINTMENT (OUTPATIENT)
Dept: RHEUMATOLOGY | Facility: CLINIC | Age: 56
End: 2021-12-23
Payer: COMMERCIAL

## 2021-12-23 VITALS
RESPIRATION RATE: 14 BRPM | HEART RATE: 92 BPM | BODY MASS INDEX: 25.88 KG/M2 | DIASTOLIC BLOOD PRESSURE: 73 MMHG | WEIGHT: 161 LBS | SYSTOLIC BLOOD PRESSURE: 112 MMHG | OXYGEN SATURATION: 99 % | HEIGHT: 66 IN | TEMPERATURE: 96.5 F

## 2021-12-23 PROCEDURE — 99214 OFFICE O/P EST MOD 30 MIN: CPT

## 2021-12-23 NOTE — ASSESSMENT
[FreeTextEntry1] : FAISAL MOFFETT is a 56 year old woman with prior dx of FMS which I agree with given sx as above and exam with diffuse soft tissue tender points, skin hypersensitivity, appearing fatigued. Pt also with mild dry eyes and dry mouth chronically, slightly imbalanced gait and with hx of issues with balance and falls. Remains with paucity of inflammatory arthritis or CTD sx today. Responsive to low dose prednisone for markedly worse FMS sx but other medical issues making it not an ideal long term med. Not able to increase current meds 2/2 sedation. Nerve blocks are helping partially, also now established with psych who may be changing med regimen. New vesicular rash, no active lesions, etiology unclear -- poison ivy vs Celiacs dermatitis herpetiformis vs disseminated shingles (unlikely) vs id reaction. \par \par - use low dose prednisone PRN sparingly for severe flares \par - c/w current regimen of medications at present without dosing changes. Discussed case with psych, advised patient I am on board with any med changes to see if this will allow for improved sx \par - c/w stretching, light aerobic exercises, massage, heat as adjuncts for FMS pain. \par - c/w nonpharmacologic FMS therapies as well - encouraged to incorporate small activities that she finds beneficial during her day, optimize stretching and light exercise, and be mindful of mood, be aware not to overextend on days she has less pain, ensure adequate rest between strenuous activities. \par - c/w topical anti-itch therapies for rash\par - renewed atarax, use PRN \par - will repeat labs as below to see if any inflammatory component, advised to continue to diligently avoid gluten  \par - RTC in 2 months
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
## 2021-12-23 NOTE — PHYSICAL EXAM
[General Appearance - Alert] : alert [General Appearance - In No Acute Distress] : in no acute distress [General Appearance - Well Nourished] : well nourished [Sclera] : the sclera and conjunctiva were normal [PERRL With Normal Accommodation] : pupils were equal in size, round, and reactive to light [Extraocular Movements] : extraocular movements were intact [Outer Ear] : the ears and nose were normal in appearance [Nasal Cavity] : the nasal mucosa and septum were normal [Oropharynx] : the oropharynx was normal [Neck Appearance] : the appearance of the neck was normal [] : no respiratory distress [Auscultation Breath Sounds / Voice Sounds] : lungs were clear to auscultation bilaterally [Heart Rate And Rhythm] : heart rate was normal and rhythm regular [Heart Sounds] : normal S1 and S2 [Murmurs] : no murmurs [Edema] : there was no peripheral edema [Abdomen Soft] : soft [Abdomen Tenderness] : non-tender [No CVA Tenderness] : no ~M costovertebral angle tenderness [No Spinal Tenderness] : no spinal tenderness [Nail Clubbing] : no clubbing  or cyanosis of the fingernails [Musculoskeletal - Swelling] : no joint swelling seen [Motor Tone] : muscle strength and tone were normal [Motor Exam] : the motor exam was normal [No Focal Deficits] : no focal deficits [Oriented To Time, Place, And Person] : oriented to person, place, and time [Impaired Insight] : insight and judgment were intact [Affect] : the affect was normal [FreeTextEntry1] : appears fatigued

## 2021-12-23 NOTE — REVIEW OF SYSTEMS
[Feeling Poorly] : feeling poorly [Feeling Tired] : feeling tired [Dry Eyes] : dryness of the eyes [Chest Pain] : chest pain [Palpitations] : palpitations [Arthralgias] : arthralgias [Joint Pain] : joint pain [As Noted in HPI] : as noted in HPI [Sleep Disturbances] : sleep disturbances [Anxiety] : anxiety [Depression] : depression [Negative] : Heme/Lymph [Joint Swelling] : no joint swelling [Joint Stiffness] : no joint stiffness [Suicidal] : not suicidal [FreeTextEntry3] : intermittent tension headaches

## 2021-12-28 ENCOUNTER — APPOINTMENT (OUTPATIENT)
Dept: PSYCHIATRY | Facility: CLINIC | Age: 56
End: 2021-12-28
Payer: COMMERCIAL

## 2021-12-28 PROCEDURE — 99214 OFFICE O/P EST MOD 30 MIN: CPT | Mod: 95

## 2022-01-06 LAB
ANA SER IF-ACNC: NEGATIVE
C3 SERPL-MCNC: 140 MG/DL
C4 SERPL-MCNC: 34 MG/DL
DEPRECATED KAPPA LC FREE/LAMBDA SER: 1.33 RATIO
ENA SS-A AB SER IA-ACNC: <0.2 AL
ENA SS-B AB SER IA-ACNC: <0.2 AL
GLIADIN IGA SER QL: <5 UNITS
GLIADIN IGG SER QL: <5 UNITS
GLIADIN PEPTIDE IGA SER-ACNC: NEGATIVE
GLIADIN PEPTIDE IGG SER-ACNC: NEGATIVE
HISTONE AB SER QL: 0.5 UNITS
IGA SER QL IEP: 131 MG/DL
IGG SER QL IEP: 1076 MG/DL
IGM SER QL IEP: 129 MG/DL
KAPPA LC CSF-MCNC: 1.23 MG/DL
KAPPA LC SERPL-MCNC: 1.63 MG/DL
TTG IGA SER IA-ACNC: <1.2 U/ML
TTG IGA SER-ACNC: NEGATIVE
TTG IGG SER IA-ACNC: <1.2 U/ML
TTG IGG SER IA-ACNC: NEGATIVE

## 2022-01-07 ENCOUNTER — NON-APPOINTMENT (OUTPATIENT)
Age: 57
End: 2022-01-07

## 2022-01-07 ENCOUNTER — RESULT REVIEW (OUTPATIENT)
Age: 57
End: 2022-01-07
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
## 2022-01-10 ENCOUNTER — APPOINTMENT (OUTPATIENT)
Dept: RADIOLOGY | Facility: HOSPITAL | Age: 57
End: 2022-01-10
Payer: COMMERCIAL

## 2022-01-10 ENCOUNTER — OUTPATIENT (OUTPATIENT)
Dept: OUTPATIENT SERVICES | Facility: HOSPITAL | Age: 57
LOS: 1 days | End: 2022-01-10
Payer: COMMERCIAL
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
## 2022-01-10 DIAGNOSIS — M53.3 SACROCOCCYGEAL DISORDERS, NOT ELSEWHERE CLASSIFIED: ICD-10-CM

## 2022-01-10 DIAGNOSIS — E89.0 POSTPROCEDURAL HYPOTHYROIDISM: Chronic | ICD-10-CM

## 2022-01-10 DIAGNOSIS — M54.50 LOW BACK PAIN, UNSPECIFIED: ICD-10-CM
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
## 2022-01-10 DIAGNOSIS — Z98.890 OTHER SPECIFIED POSTPROCEDURAL STATES: Chronic | ICD-10-CM
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
## 2022-01-10 LAB
HEMOCCULT STL QL IA: NEGATIVE
T4 FREE SERPL-MCNC: 1.1 NG/DL
TSH SERPL-ACNC: 0.2 UIU/ML

## 2022-01-10 PROCEDURE — 72100 X-RAY EXAM L-S SPINE 2/3 VWS: CPT

## 2022-01-10 PROCEDURE — 72220 X-RAY EXAM SACRUM TAILBONE: CPT | Mod: 26

## 2022-01-10 PROCEDURE — 72100 X-RAY EXAM L-S SPINE 2/3 VWS: CPT | Mod: 26

## 2022-01-10 PROCEDURE — 72220 X-RAY EXAM SACRUM TAILBONE: CPT

## 2022-01-13 ENCOUNTER — RESULT REVIEW (OUTPATIENT)
Age: 57
End: 2022-01-13

## 2022-01-13 ENCOUNTER — APPOINTMENT (OUTPATIENT)
Dept: ULTRASOUND IMAGING | Facility: HOSPITAL | Age: 57
End: 2022-01-13
Payer: COMMERCIAL

## 2022-01-13 ENCOUNTER — OUTPATIENT (OUTPATIENT)
Dept: OUTPATIENT SERVICES | Facility: HOSPITAL | Age: 57
LOS: 1 days | End: 2022-01-13
Payer: COMMERCIAL

## 2022-01-13 DIAGNOSIS — Z98.890 OTHER SPECIFIED POSTPROCEDURAL STATES: Chronic | ICD-10-CM

## 2022-01-13 DIAGNOSIS — E89.0 POSTPROCEDURAL HYPOTHYROIDISM: Chronic | ICD-10-CM

## 2022-01-13 DIAGNOSIS — E03.9 HYPOTHYROIDISM, UNSPECIFIED: ICD-10-CM

## 2022-01-13 PROCEDURE — 76536 US EXAM OF HEAD AND NECK: CPT | Mod: 26

## 2022-01-13 PROCEDURE — 76856 US EXAM PELVIC COMPLETE: CPT | Mod: 26

## 2022-01-13 PROCEDURE — 76830 TRANSVAGINAL US NON-OB: CPT | Mod: 26

## 2022-01-13 PROCEDURE — 76536 US EXAM OF HEAD AND NECK: CPT

## 2022-01-13 PROCEDURE — 76856 US EXAM PELVIC COMPLETE: CPT

## 2022-01-13 PROCEDURE — 76830 TRANSVAGINAL US NON-OB: CPT

## 2022-01-18 ENCOUNTER — NON-APPOINTMENT (OUTPATIENT)
Age: 57
End: 2022-01-18

## 2022-01-18 ENCOUNTER — APPOINTMENT (OUTPATIENT)
Dept: ELECTROPHYSIOLOGY | Facility: CLINIC | Age: 57
End: 2022-01-18
Payer: COMMERCIAL

## 2022-01-18 PROCEDURE — G2066: CPT

## 2022-01-18 PROCEDURE — 93298 REM INTERROG DEV EVAL SCRMS: CPT

## 2022-01-19 ENCOUNTER — APPOINTMENT (OUTPATIENT)
Dept: PSYCHIATRY | Facility: CLINIC | Age: 57
End: 2022-01-19
Payer: COMMERCIAL

## 2022-01-19 PROCEDURE — 99214 OFFICE O/P EST MOD 30 MIN: CPT | Mod: 95

## 2022-01-19 RX ORDER — BUPROPION HYDROCHLORIDE 100 MG/1
100 TABLET, FILM COATED, EXTENDED RELEASE ORAL
Qty: 30 | Refills: 0 | Status: DISCONTINUED | COMMUNITY
Start: 2021-12-28 | End: 2022-01-19

## 2022-01-28 ENCOUNTER — NON-APPOINTMENT (OUTPATIENT)
Age: 57
End: 2022-01-28

## 2022-01-28 DIAGNOSIS — R25.2 CRAMP AND SPASM: ICD-10-CM

## 2022-02-01 ENCOUNTER — APPOINTMENT (OUTPATIENT)
Dept: ENDOCRINOLOGY | Facility: CLINIC | Age: 57
End: 2022-02-01
Payer: COMMERCIAL

## 2022-02-01 VITALS
HEART RATE: 110 BPM | SYSTOLIC BLOOD PRESSURE: 110 MMHG | WEIGHT: 165 LBS | TEMPERATURE: 95.9 F | HEIGHT: 66 IN | DIASTOLIC BLOOD PRESSURE: 72 MMHG | OXYGEN SATURATION: 98 % | BODY MASS INDEX: 26.52 KG/M2

## 2022-02-01 PROCEDURE — 99215 OFFICE O/P EST HI 40 MIN: CPT

## 2022-02-01 NOTE — ASSESSMENT
[FreeTextEntry1] : 56 year old female with history of thyroid cancer ( path not available) s/p thyroidectomy in 2006 and MADRID in 2007. \par Tg in 02/2021 was <0.2 \par \par  \par -Will check Tg, TSH, FT4 at this time \par -Continue Levothyroxine 112 mcg x 6 tabs per week \par -US is YANIQUE \par -Patient will send over path reports from before \par -TSH goal of 0.5-2.0 \par -Follow up in 6 months. \par

## 2022-02-01 NOTE — PHYSICAL EXAM
[Alert] : alert [Well Nourished] : well nourished [No Acute Distress] : no acute distress [Normal Sclera/Conjunctiva] : normal sclera/conjunctiva [EOMI] : extra ocular movement intact [PERRL] : pupils equal, round and reactive to light [Normal Outer Ear/Nose] : the ears and nose were normal in appearance [Normal Hearing] : hearing was normal [Normal TMs] : both tympanic membranes were normal [Supple] : the neck was supple [Well Healed Scar] : well healed scar [No Respiratory Distress] : no respiratory distress [Clear to Auscultation] : lungs were clear to auscultation bilaterally [Normal S1, S2] : normal S1 and S2 [Normal Rate] : heart rate was normal [Regular Rhythm] : with a regular rhythm [Normal Bowel Sounds] : normal bowel sounds [Not Tender] : non-tender [Soft] : abdomen soft [Normal Gait] : normal gait [No Clubbing, Cyanosis] : no clubbing  or cyanosis of the fingernails [No Joint Swelling] : no joint swelling seen [Normal Strength/Tone] : muscle strength and tone were normal [No Rash] : no rash [No Skin Lesions] : no skin lesions [No Motor Deficits] : the motor exam was normal [Normal Reflexes] : deep tendon reflexes were 2+ and symmetric [No Tremors] : no tremors [Oriented x3] : oriented to person, place, and time [Normal Affect] : the affect was normal [Normal Insight/Judgement] : insight and judgment were intact [Normal Mood] : the mood was normal

## 2022-02-01 NOTE — HISTORY OF PRESENT ILLNESS
[FreeTextEntry1] : 56 year old female w/ history of fibromyalgia, depression with history of thyroid cancer here for follow up \par \par 2006 - History of thyroidectomy ( Pathology is unclear) performed at [facility] with Dr. Claros \par 2007: MADRID administered \par \par She has not had an ultrasound of the neck in 10 years \par WBS was performed post therapy in 2012 and was negative for any uptake \par \par 01/2021: Tg was <0.2 \par \par -Improvement in HP \par -Severe constipation \par -Increased hair loss \par -Increased tremors \par \par \par \par She is currently on levothyroxine 112 mcg x 6.5 tabs\par \par \par \par

## 2022-02-07 RX ORDER — MILNACIPRAN HYDROCHLORIDE 12.5-25-5
12.5 & 25 & 5 KIT ORAL
Qty: 1 | Refills: 0 | Status: DISCONTINUED | COMMUNITY
Start: 2022-01-19 | End: 2022-02-07

## 2022-02-08 ENCOUNTER — LABORATORY RESULT (OUTPATIENT)
Age: 57
End: 2022-02-08

## 2022-02-09 ENCOUNTER — NON-APPOINTMENT (OUTPATIENT)
Age: 57
End: 2022-02-09

## 2022-02-09 RX ORDER — ESCITALOPRAM OXALATE 20 MG/1
20 TABLET ORAL DAILY
Qty: 90 | Refills: 1 | Status: DISCONTINUED | COMMUNITY
Start: 2021-05-03 | End: 2022-02-09

## 2022-02-09 RX ORDER — AMITRIPTYLINE HYDROCHLORIDE 10 MG/1
10 TABLET, FILM COATED ORAL
Qty: 30 | Refills: 0 | Status: DISCONTINUED | COMMUNITY
Start: 2021-11-30 | End: 2022-02-09

## 2022-02-11 ENCOUNTER — LABORATORY RESULT (OUTPATIENT)
Age: 57
End: 2022-02-11

## 2022-02-11 ENCOUNTER — APPOINTMENT (OUTPATIENT)
Dept: RHEUMATOLOGY | Facility: CLINIC | Age: 57
End: 2022-02-11
Payer: COMMERCIAL

## 2022-02-11 VITALS
SYSTOLIC BLOOD PRESSURE: 120 MMHG | OXYGEN SATURATION: 98 % | RESPIRATION RATE: 14 BRPM | HEIGHT: 65 IN | TEMPERATURE: 97.4 F | DIASTOLIC BLOOD PRESSURE: 80 MMHG | WEIGHT: 168 LBS | BODY MASS INDEX: 27.99 KG/M2 | HEART RATE: 80 BPM

## 2022-02-11 PROCEDURE — 99214 OFFICE O/P EST MOD 30 MIN: CPT

## 2022-02-11 NOTE — PHYSICAL EXAM
[General Appearance - Alert] : alert [General Appearance - In No Acute Distress] : in no acute distress [General Appearance - Well Nourished] : well nourished [Sclera] : the sclera and conjunctiva were normal [PERRL With Normal Accommodation] : pupils were equal in size, round, and reactive to light [Extraocular Movements] : extraocular movements were intact [Outer Ear] : the ears and nose were normal in appearance [Oropharynx] : the oropharynx was normal [Neck Appearance] : the appearance of the neck was normal [Auscultation Breath Sounds / Voice Sounds] : lungs were clear to auscultation bilaterally [Heart Rate And Rhythm] : heart rate was normal and rhythm regular [Heart Sounds] : normal S1 and S2 [Murmurs] : no murmurs [Edema] : there was no peripheral edema [Abdomen Soft] : soft [Abdomen Tenderness] : non-tender [No CVA Tenderness] : no ~M costovertebral angle tenderness [No Spinal Tenderness] : no spinal tenderness [Nail Clubbing] : no clubbing  or cyanosis of the fingernails [Musculoskeletal - Swelling] : no joint swelling seen [Motor Tone] : muscle strength and tone were normal [Motor Exam] : the motor exam was normal [No Focal Deficits] : no focal deficits [Oriented To Time, Place, And Person] : oriented to person, place, and time [Impaired Insight] : insight and judgment were intact [Affect] : the affect was normal [] : no rash [FreeTextEntry1] : appears fatigued

## 2022-02-11 NOTE — DATA REVIEWED
0100 listed a a home cpap when in reality it is a v-60.  Placed on patient at [address] [place] [address] Rd [FreeTextEntry1] : Available notes, and pertinent labs & imaging in EMR reviewed. \par \par ZARINA, RF negative in 2020
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
## 2022-02-11 NOTE — ASSESSMENT
[FreeTextEntry1] : FAISAL MOFFETT is a 56 year old woman with prior dx of FMS which I agree with given sx as above and exam with diffuse soft tissue tender points, skin hypersensitivity, appearing fatigued. Pt also with mild dry eyes and dry mouth chronically, slightly imbalanced gait and with hx of issues with balance and falls. Remains with paucity of inflammatory arthritis or CTD sx. Responsive to low dose prednisone for markedly worse FMS sx but other medical issues making it not an ideal long term med. Did not tolerate trial of Savella. Nerve blocks are helping partially, also now established with psych who is helping to guide medication. \par \par - c/w low dose duloxetine for now, ?increase in next few weeks if tolerating and may benefit from more \par - use low dose prednisone PRN sparingly for severe flares \par - I remain on board with any changes/input from psychiatry as I suspect this is a concomitant problem. Also discussed with her I suspect there is an overlap with her known thyroid disease especially in light of her weight gain tho this would be hard to pin down. Will touch base with endo as well. \par - c/w stretching, light aerobic exercises, massage, heat as adjuncts for FMS pain. \par - c/w nonpharmacologic FMS therapies as well - encouraged to incorporate small activities that she finds beneficial during her day, optimize stretching and light exercise, and be mindful of mood, be aware not to overextend on days she has less pain, ensure adequate rest between strenuous activities. \par - reviewed recent lab w/u, remains negative for inflammatory process \par - advised to f/u with pain mgmt to see if any alternate options to nerve block that may help \par - RTC in 2 months

## 2022-02-17 ENCOUNTER — APPOINTMENT (OUTPATIENT)
Dept: PSYCHIATRY | Facility: CLINIC | Age: 57
End: 2022-02-17
Payer: COMMERCIAL

## 2022-02-17 PROCEDURE — 99215 OFFICE O/P EST HI 40 MIN: CPT | Mod: 95

## 2022-02-22 ENCOUNTER — APPOINTMENT (OUTPATIENT)
Dept: ELECTROPHYSIOLOGY | Facility: CLINIC | Age: 57
End: 2022-02-22
Payer: COMMERCIAL

## 2022-02-22 ENCOUNTER — NON-APPOINTMENT (OUTPATIENT)
Age: 57
End: 2022-02-22

## 2022-02-22 PROCEDURE — G2066: CPT

## 2022-02-22 PROCEDURE — 93298 REM INTERROG DEV EVAL SCRMS: CPT

## 2022-02-23 ENCOUNTER — RX RENEWAL (OUTPATIENT)
Age: 57
End: 2022-02-23

## 2022-03-25 ENCOUNTER — NON-APPOINTMENT (OUTPATIENT)
Age: 57
End: 2022-03-25

## 2022-03-25 ENCOUNTER — APPOINTMENT (OUTPATIENT)
Dept: ELECTROPHYSIOLOGY | Facility: CLINIC | Age: 57
End: 2022-03-25
Payer: COMMERCIAL

## 2022-03-25 PROCEDURE — G2066: CPT

## 2022-03-25 PROCEDURE — 93298 REM INTERROG DEV EVAL SCRMS: CPT

## 2022-03-29 ENCOUNTER — APPOINTMENT (OUTPATIENT)
Dept: PSYCHIATRY | Facility: CLINIC | Age: 57
End: 2022-03-29
Payer: COMMERCIAL

## 2022-03-29 PROCEDURE — 99214 OFFICE O/P EST MOD 30 MIN: CPT

## 2022-04-15 ENCOUNTER — APPOINTMENT (OUTPATIENT)
Dept: RHEUMATOLOGY | Facility: CLINIC | Age: 57
End: 2022-04-15
Payer: COMMERCIAL

## 2022-04-15 VITALS
OXYGEN SATURATION: 96 % | WEIGHT: 165 LBS | SYSTOLIC BLOOD PRESSURE: 96 MMHG | BODY MASS INDEX: 27.49 KG/M2 | RESPIRATION RATE: 18 BRPM | DIASTOLIC BLOOD PRESSURE: 78 MMHG | HEIGHT: 65 IN | TEMPERATURE: 96.6 F | HEART RATE: 103 BPM

## 2022-04-15 PROCEDURE — 99213 OFFICE O/P EST LOW 20 MIN: CPT

## 2022-04-25 ENCOUNTER — NON-APPOINTMENT (OUTPATIENT)
Age: 57
End: 2022-04-25

## 2022-04-25 ENCOUNTER — APPOINTMENT (OUTPATIENT)
Dept: ELECTROPHYSIOLOGY | Facility: CLINIC | Age: 57
End: 2022-04-25
Payer: COMMERCIAL

## 2022-04-25 PROCEDURE — 93298 REM INTERROG DEV EVAL SCRMS: CPT

## 2022-04-25 PROCEDURE — G2066: CPT

## 2022-05-02 LAB
CCP AB SER IA-ACNC: <8 UNITS
CRP SERPL-MCNC: 6 MG/L
ERYTHROCYTE [SEDIMENTATION RATE] IN BLOOD BY WESTERGREN METHOD: 13 MM/HR
RF+CCP IGG SER-IMP: NEGATIVE
RHEUMATOID FACT SER QL: <10 IU/ML

## 2022-05-03 ENCOUNTER — APPOINTMENT (OUTPATIENT)
Dept: PSYCHIATRY | Facility: CLINIC | Age: 57
End: 2022-05-03
Payer: COMMERCIAL

## 2022-05-03 PROCEDURE — 99214 OFFICE O/P EST MOD 30 MIN: CPT
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
## 2022-05-12 NOTE — ASSESSMENT
[FreeTextEntry1] : FAISAL MOFFETT is a 56 year old woman with prior dx of FMS which I agree with given sx as above and exam with diffuse soft tissue tender points, skin hypersensitivity, appearing fatigued. Pt also with mild dry eyes and dry mouth chronically, slightly imbalanced gait and with hx of issues with balance and falls. Remains with paucity of inflammatory arthritis or CTD sx. Responsive to low dose prednisone for markedly worse FMS sx but other medical issues making it not an ideal long term med. Did not tolerate trial of Savella. Nerve blocks are helping partially, also now established with psych who is helping to guide medication. \par \par # FMS \par - c/w duloxetine 40mg for now as she is doing better \par - use low dose prednisone PRN sparingly for severe flares \par - I remain on board with any changes/input from psychiatry as I suspect this is a concomitant problem. Also discussed with her I suspect there is an overlap with her known thyroid disease especially in light of her weight gain tho this would be hard to pin down. She is following up with Endo \par - c/w stretching, light aerobic exercises, massage, heat as adjuncts for FMS pain. \par - c/w nonpharmacologic FMS therapies as well - encouraged to incorporate small activities that she finds beneficial during her day, optimize stretching and light exercise, and be mindful of mood, be aware not to overextend on days she has less pain, ensure adequate rest between strenuous activities. \par - advised to f/u with pain mgmt to see if any alternate options to nerve block that may help \par \par # ?concomitant inflammatory process \par - reviewed lupus w/u -- all negative \par - will check RA w/u to see if any change as remotely checked \par \par RTC in 2 months

## 2022-05-12 NOTE — PHYSICAL EXAM
[General Appearance - Alert] : alert [General Appearance - In No Acute Distress] : in no acute distress [General Appearance - Well Nourished] : well nourished [Sclera] : the sclera and conjunctiva were normal [PERRL With Normal Accommodation] : pupils were equal in size, round, and reactive to light [Extraocular Movements] : extraocular movements were intact [Outer Ear] : the ears and nose were normal in appearance [Neck Appearance] : the appearance of the neck was normal [Auscultation Breath Sounds / Voice Sounds] : lungs were clear to auscultation bilaterally [Heart Sounds] : normal S1 and S2 [Heart Rate And Rhythm] : heart rate was normal and rhythm regular [Murmurs] : no murmurs [Abdomen Soft] : soft [Edema] : there was no peripheral edema [Abdomen Tenderness] : non-tender [No CVA Tenderness] : no ~M costovertebral angle tenderness [No Spinal Tenderness] : no spinal tenderness [Nail Clubbing] : no clubbing  or cyanosis of the fingernails [Musculoskeletal - Swelling] : no joint swelling seen [Motor Tone] : muscle strength and tone were normal [] : no rash [Motor Exam] : the motor exam was normal [No Focal Deficits] : no focal deficits [Oriented To Time, Place, And Person] : oriented to person, place, and time [Impaired Insight] : insight and judgment were intact [Affect] : the affect was normal [FreeTextEntry1] : appears less fatigued

## 2022-05-12 NOTE — REVIEW OF SYSTEMS
[Feeling Poorly] : feeling poorly [Feeling Tired] : feeling tired [Dry Eyes] : dryness of the eyes [Palpitations] : palpitations [Arthralgias] : arthralgias [Joint Pain] : joint pain [As Noted in HPI] : as noted in HPI [Sleep Disturbances] : sleep disturbances [Anxiety] : anxiety [Depression] : depression [Negative] : Heme/Lymph [Joint Swelling] : no joint swelling [Joint Stiffness] : no joint stiffness [Suicidal] : not suicidal [FreeTextEntry3] : intermittent tension headaches

## 2022-05-30 LAB
T4 FREE SERPL-MCNC: 1.1 NG/DL
THYROGLOB AB SERPL-ACNC: <20 IU/ML
THYROGLOB SERPL-MCNC: <0.2 NG/ML
TSH SERPL-ACNC: 0.19 UIU/ML

## 2022-06-01 ENCOUNTER — APPOINTMENT (OUTPATIENT)
Dept: ELECTROPHYSIOLOGY | Facility: CLINIC | Age: 57
End: 2022-06-01
Payer: COMMERCIAL

## 2022-06-01 ENCOUNTER — NON-APPOINTMENT (OUTPATIENT)
Age: 57
End: 2022-06-01

## 2022-06-01 PROCEDURE — G2066: CPT

## 2022-06-01 PROCEDURE — 93298 REM INTERROG DEV EVAL SCRMS: CPT

## 2022-06-07 ENCOUNTER — APPOINTMENT (OUTPATIENT)
Dept: PSYCHIATRY | Facility: CLINIC | Age: 57
End: 2022-06-07
Payer: COMMERCIAL

## 2022-06-07 PROCEDURE — 99215 OFFICE O/P EST HI 40 MIN: CPT

## 2022-06-07 RX ORDER — PREDNISONE 20 MG/1
20 TABLET ORAL
Qty: 10 | Refills: 0 | Status: DISCONTINUED | COMMUNITY
Start: 2021-12-06 | End: 2022-06-07

## 2022-06-07 RX ORDER — PREDNISONE 5 MG/1
5 TABLET ORAL
Qty: 60 | Refills: 0 | Status: DISCONTINUED | COMMUNITY
Start: 2021-09-23 | End: 2022-06-07

## 2022-06-13 ENCOUNTER — APPOINTMENT (OUTPATIENT)
Dept: MRI IMAGING | Facility: HOSPITAL | Age: 57
End: 2022-06-13

## 2022-06-14 ENCOUNTER — APPOINTMENT (OUTPATIENT)
Dept: ENDOCRINOLOGY | Facility: CLINIC | Age: 57
End: 2022-06-14
Payer: COMMERCIAL

## 2022-06-14 VITALS
HEART RATE: 98 BPM | OXYGEN SATURATION: 96 % | DIASTOLIC BLOOD PRESSURE: 70 MMHG | SYSTOLIC BLOOD PRESSURE: 98 MMHG | TEMPERATURE: 97.6 F | WEIGHT: 163 LBS | HEIGHT: 66 IN | BODY MASS INDEX: 26.2 KG/M2

## 2022-06-14 PROCEDURE — 99215 OFFICE O/P EST HI 40 MIN: CPT

## 2022-06-16 ENCOUNTER — APPOINTMENT (OUTPATIENT)
Dept: FAMILY MEDICINE | Facility: CLINIC | Age: 57
End: 2022-06-16
Payer: COMMERCIAL

## 2022-06-16 VITALS
OXYGEN SATURATION: 99 % | BODY MASS INDEX: 26.47 KG/M2 | DIASTOLIC BLOOD PRESSURE: 75 MMHG | RESPIRATION RATE: 16 BRPM | TEMPERATURE: 96.9 F | HEART RATE: 87 BPM | SYSTOLIC BLOOD PRESSURE: 111 MMHG | WEIGHT: 164 LBS

## 2022-06-16 DIAGNOSIS — L25.9 UNSPECIFIED CONTACT DERMATITIS, UNSPECIFIED CAUSE: ICD-10-CM

## 2022-06-16 PROCEDURE — 99213 OFFICE O/P EST LOW 20 MIN: CPT

## 2022-06-16 RX ORDER — CLOBETASOL PROPIONATE 0.05 G/100ML
0.05 LOTION TOPICAL TWICE DAILY
Qty: 1 | Refills: 1 | Status: DISCONTINUED | COMMUNITY
Start: 2021-12-06 | End: 2022-06-16

## 2022-06-16 NOTE — HISTORY OF PRESENT ILLNESS
[FreeTextEntry8] : cc: poison ivy\par \par 54 yo female with h/o fibromyalgia, hypothyroid (thyroid cancer s/p MADRID), and recent finding of lacunar infarct on brain MRI presents for poison ivy.  Exposed herself at home, noted lesion on her finger and right lower back. Wanted to come in while it was still early stages. Otherwise notes she has continued to regular follow up with specialists. Back on duloxetine. Stopped new fibromyalgia medication that was trialed. She will be going to neurology later today due to continued falls. (Outside of [facility]).

## 2022-06-16 NOTE — PHYSICAL EXAM
[No Respiratory Distress] : no respiratory distress  [Coordination Grossly Intact] : coordination grossly intact [No Focal Deficits] : no focal deficits [Normal] : affect was normal and insight and judgment were intact [de-identified] : erythematous papules in a linear fashion on right posterior hip, right index finger.

## 2022-06-16 NOTE — REVIEW OF SYSTEMS
[Recent Change In Weight] : ~T recent weight change [Constipation] : constipation [Joint Pain] : joint pain [Anxiety] : anxiety [Depression] : depression [Negative] : Respiratory [FreeTextEntry2] : weight gain [de-identified] : falls

## 2022-06-19 NOTE — ASSESSMENT
[FreeTextEntry1] : 56 year old female with history of thyroid cancer ( path not available) s/p thyroidectomy in 2006 and MADRID in 2007. \par Tg in 02/2021 was <0.2 \par \par  \par -Will check Tg, TSH, FT4 at this time \par -Continue Levothyroxine 112 mcg x 6 tabs per week \par -US is YANIQUE \par -TSH goal of 0.5-2.0 \par -Follow up in 6 months. \par

## 2022-06-19 NOTE — HISTORY OF PRESENT ILLNESS
[FreeTextEntry1] : 56 year old female w/ history of fibromyalgia, depression with history of thyroid cancer here for follow up \par \par 2006 - History of thyroidectomy ( Pathology is unclear) performed at [facility] with Dr. Claros \par 2007: MADRID administered \par \par She has not had an ultrasound of the neck in 10 years \par WBS was performed post therapy in 2012 and was negative for any uptake \par \par 01/2021: Tg was <0.2 \par \par \par Symptoms: \par -No  HP or tremors \par -Severe constipation ( Taking motegtity and 4 doses of miralax per day) \par -Baseline hair loss \par -Some new tremor \par \par \par \par She is currently on levothyroxine 112 mcg x 6.5 tabs\par \par \par \par

## 2022-06-23 ENCOUNTER — APPOINTMENT (OUTPATIENT)
Dept: RHEUMATOLOGY | Facility: CLINIC | Age: 57
End: 2022-06-23
Payer: COMMERCIAL

## 2022-06-23 VITALS
WEIGHT: 161 LBS | BODY MASS INDEX: 25.88 KG/M2 | RESPIRATION RATE: 16 BRPM | HEART RATE: 91 BPM | DIASTOLIC BLOOD PRESSURE: 64 MMHG | SYSTOLIC BLOOD PRESSURE: 100 MMHG | OXYGEN SATURATION: 97 % | TEMPERATURE: 97.3 F | HEIGHT: 66 IN

## 2022-06-23 PROCEDURE — 99213 OFFICE O/P EST LOW 20 MIN: CPT
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
## 2022-06-23 NOTE — PHYSICAL EXAM
[General Appearance - Alert] : alert [General Appearance - In No Acute Distress] : in no acute distress [General Appearance - Well Nourished] : well nourished [Sclera] : the sclera and conjunctiva were normal [PERRL With Normal Accommodation] : pupils were equal in size, round, and reactive to light [Extraocular Movements] : extraocular movements were intact [Outer Ear] : the ears and nose were normal in appearance [Neck Appearance] : the appearance of the neck was normal [Auscultation Breath Sounds / Voice Sounds] : lungs were clear to auscultation bilaterally [Heart Rate And Rhythm] : heart rate was normal and rhythm regular [Heart Sounds] : normal S1 and S2 [Edema] : there was no peripheral edema [No CVA Tenderness] : no ~M costovertebral angle tenderness [No Spinal Tenderness] : no spinal tenderness [Nail Clubbing] : no clubbing  or cyanosis of the fingernails [Musculoskeletal - Swelling] : no joint swelling seen [Motor Tone] : muscle strength and tone were normal [] : no rash [Motor Exam] : the motor exam was normal [No Focal Deficits] : no focal deficits [Oriented To Time, Place, And Person] : oriented to person, place, and time [Impaired Insight] : insight and judgment were intact [Affect] : the affect was normal [FreeTextEntry1] : appears less fatigued

## 2022-06-23 NOTE — ASSESSMENT
[FreeTextEntry1] : FAISAL MOFFETT is a 56 year old woman with prior dx of FMS which I agree with given sx as above and exam with diffuse soft tissue tender points, skin hypersensitivity, appearing fatigued. Pt also with mild dry eyes and dry mouth chronically, slightly imbalanced gait and with hx of issues with balance and falls. Remains with paucity of inflammatory arthritis or CTD sx. Responsive to low dose prednisone for markedly worse FMS sx but other medical issues making it not an ideal long term med. Did not tolerate trial of Savella. Nerve blocks are helping partially, also now established with psych who is helping to guide medication. \par \par # FMS \par - c/w duloxetine 50mg \par - advised to resume lyrica at present, if LE edema recurs will consider using gabapentin instead \par - use low dose prednisone PRN sparingly for severe flares \par - I remain on board with any changes/input from psychiatry as I suspect this is a concomitant problem. Also discussed with her I suspect there is an overlap with her known thyroid disease especially in light of her weight gain tho this has been hard to pin down. She is following up with Endo \par - c/w stretching, light aerobic exercises, massage, heat as adjuncts for FMS pain. \par - c/w nonpharmacologic FMS therapies as well - encouraged to incorporate small activities that she finds beneficial during her day, optimize stretching and light exercise, and be mindful of mood, be aware not to overextend on days she has less pain, ensure adequate rest between strenuous activities. \par \par # ?concomitant inflammatory process \par - reviewed lupus and RA w/u -- all negative \par - will clinically monitor -- no sx today \par \par # pustular rash in fixed locations -- odd for posion ivy\par - complete steroids per PMD\par - if recurs, prior to topical or PO steroids advised derm eval for possible bx\par \par RTC in 2 months

## 2022-07-05 ENCOUNTER — RX RENEWAL (OUTPATIENT)
Age: 57
End: 2022-07-05

## 2022-07-06 ENCOUNTER — APPOINTMENT (OUTPATIENT)
Dept: ELECTROPHYSIOLOGY | Facility: CLINIC | Age: 57
End: 2022-07-06

## 2022-07-06 ENCOUNTER — NON-APPOINTMENT (OUTPATIENT)
Age: 57
End: 2022-07-06

## 2022-07-06 PROCEDURE — 93298 REM INTERROG DEV EVAL SCRMS: CPT

## 2022-07-06 PROCEDURE — G2066: CPT

## 2022-07-07 ENCOUNTER — RX RENEWAL (OUTPATIENT)
Age: 57
End: 2022-07-07

## 2022-07-11 ENCOUNTER — NON-APPOINTMENT (OUTPATIENT)
Age: 57
End: 2022-07-11

## 2022-07-11 ENCOUNTER — APPOINTMENT (OUTPATIENT)
Dept: CARDIOLOGY | Facility: CLINIC | Age: 57
End: 2022-07-11

## 2022-07-11 VITALS — DIASTOLIC BLOOD PRESSURE: 70 MMHG | SYSTOLIC BLOOD PRESSURE: 100 MMHG | HEART RATE: 64 BPM

## 2022-07-11 VITALS
BODY MASS INDEX: 27.31 KG/M2 | WEIGHT: 160 LBS | HEIGHT: 64 IN | RESPIRATION RATE: 17 BRPM | OXYGEN SATURATION: 98 % | HEART RATE: 87 BPM | TEMPERATURE: 97.6 F

## 2022-07-11 PROCEDURE — 93000 ELECTROCARDIOGRAM COMPLETE: CPT

## 2022-07-11 PROCEDURE — 99214 OFFICE O/P EST MOD 30 MIN: CPT

## 2022-07-11 NOTE — REASON FOR VISIT
[Symptom and Test Evaluation] : symptom and test evaluation [FreeTextEntry1] : Patient returns for follow up. She continues to complain of pain that she says is very severe and feels like she is going to die that occurs every several days and lasts for approximately 20 minutes. It is not exertional it can occur at any time. She is looking for answers and looking for treatment. Her loop recorders have been unrevealing. She is requesting nitroglycerin. She  is now concerned because her 85-year-old mother who also has normal coronary arteries was recently diagnosed with heart failure

## 2022-07-11 NOTE — ASSESSMENT
[FreeTextEntry1] : Impression:\par 1. Markedly atypical chest pain more than likely not cardiac in nature especially in light of normal coronary arteries more likely due to fibromyalgia for which she is on disability.\par \par Plan:\par 1. Trial of nitroglycerin is not entirely unreasonable although I told the patient not to take more than one time and to make sure she is at home in a chair or in bed when she takes it due to fear of hypotension. She will inform you of the results

## 2022-07-12 ENCOUNTER — APPOINTMENT (OUTPATIENT)
Dept: PSYCHIATRY | Facility: CLINIC | Age: 57
End: 2022-07-12

## 2022-07-12 PROCEDURE — 99214 OFFICE O/P EST MOD 30 MIN: CPT

## 2022-07-15 ENCOUNTER — RX RENEWAL (OUTPATIENT)
Age: 57
End: 2022-07-15

## 2022-08-10 ENCOUNTER — NON-APPOINTMENT (OUTPATIENT)
Age: 57
End: 2022-08-10

## 2022-08-10 ENCOUNTER — APPOINTMENT (OUTPATIENT)
Dept: ELECTROPHYSIOLOGY | Facility: CLINIC | Age: 57
End: 2022-08-10

## 2022-08-10 PROCEDURE — 93298 REM INTERROG DEV EVAL SCRMS: CPT

## 2022-08-10 PROCEDURE — G2066: CPT

## 2022-08-29 ENCOUNTER — APPOINTMENT (OUTPATIENT)
Dept: PSYCHIATRY | Facility: CLINIC | Age: 57
End: 2022-08-29

## 2022-08-29 PROCEDURE — 99214 OFFICE O/P EST MOD 30 MIN: CPT

## 2022-08-29 RX ORDER — DULOXETINE HYDROCHLORIDE 40 MG/1
40 CAPSULE, DELAYED RELEASE PELLETS ORAL
Qty: 30 | Refills: 0 | Status: DISCONTINUED | COMMUNITY
Start: 2022-03-29

## 2022-09-06 ENCOUNTER — APPOINTMENT (OUTPATIENT)
Dept: RHEUMATOLOGY | Facility: CLINIC | Age: 57
End: 2022-09-06

## 2022-09-06 VITALS
RESPIRATION RATE: 18 BRPM | HEIGHT: 64 IN | WEIGHT: 165 LBS | BODY MASS INDEX: 28.17 KG/M2 | OXYGEN SATURATION: 97 % | TEMPERATURE: 97.4 F | HEART RATE: 92 BPM | DIASTOLIC BLOOD PRESSURE: 74 MMHG | SYSTOLIC BLOOD PRESSURE: 100 MMHG

## 2022-09-06 DIAGNOSIS — L30.8 OTHER SPECIFIED DERMATITIS: ICD-10-CM

## 2022-09-06 DIAGNOSIS — L08.0 PYODERMA: ICD-10-CM

## 2022-09-06 DIAGNOSIS — R23.2 FLUSHING: ICD-10-CM

## 2022-09-06 PROCEDURE — 99214 OFFICE O/P EST MOD 30 MIN: CPT

## 2022-09-06 NOTE — ASSESSMENT
[FreeTextEntry1] : FAISAL MOFFETT is a 56 year old woman with prior dx of FMS which I agree with given sx as above and exam with diffuse soft tissue tender points, skin hypersensitivity, appearing fatigued. Pt also with mild dry eyes and dry mouth chronically, slightly imbalanced gait and with hx of issues with balance and falls. Remains with paucity of inflammatory arthritis or CTD sx. Responsive to low dose prednisone for markedly worse FMS sx but other medical issues making it not an ideal long term med. Did not tolerate trial of Savella. Nerve blocks are helping partially, also now established with psych who is helping to guide medication. \par \par # FMS \par - c/w duloxetine 50mg -- ok with increasing to 60mg when feasible \par - c/w lyrica TID \par - use low dose prednisone PRN sparingly for severe flares \par - I remain on board with any changes/input from psychiatry as I suspect this is a concomitant problem. \par - c/w stretching, light aerobic exercises, massage, heat as adjuncts for FMS pain. \par - c/w nonpharmacologic FMS therapies as well - encouraged to incorporate small activities that she finds beneficial during her day, optimize stretching and light exercise, and be mindful of mood, be aware not to overextend on days she has less pain, ensure adequate rest between strenuous activities. \par \par # ?concomitant inflammatory process \par - reviewed lupus and RA w/u -- all negative \par - will clinically monitor -- no sx today \par \par # papular rash in fixed locations -- odd for posion ivy, pending derm bx results \par - c/w topical steroids, can add non drowsy antihistamine during the day to help with itch\par \par # hot flashes\par - check A1c, ? hypoglycemia tho she denies developing AMS during these episodes\par \par # low PO intake, unintentional weight loss\par - nutritionist referral --? intake too low vs underestimating her PO intake \par \par RTC in 2 months
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
## 2022-09-06 NOTE — REVIEW OF SYSTEMS
[Feeling Poorly] : feeling poorly [Feeling Tired] : feeling tired [Dry Eyes] : dryness of the eyes [Palpitations] : palpitations [Arthralgias] : arthralgias [Joint Pain] : joint pain [Sleep Disturbances] : sleep disturbances [Anxiety] : anxiety [Depression] : depression [Negative] : Heme/Lymph [Joint Swelling] : no joint swelling [Joint Stiffness] : no joint stiffness [Suicidal] : not suicidal [As Noted in HPI] : as noted in HPI [Hot Flashes] : hot flashes [FreeTextEntry3] : intermittent tension headaches

## 2022-09-06 NOTE — PHYSICAL EXAM
[General Appearance - Alert] : alert [General Appearance - In No Acute Distress] : in no acute distress [General Appearance - Well Nourished] : well nourished [Sclera] : the sclera and conjunctiva were normal [PERRL With Normal Accommodation] : pupils were equal in size, round, and reactive to light [Extraocular Movements] : extraocular movements were intact [Outer Ear] : the ears and nose were normal in appearance [Neck Appearance] : the appearance of the neck was normal [Auscultation Breath Sounds / Voice Sounds] : lungs were clear to auscultation bilaterally [Heart Rate And Rhythm] : heart rate was normal and rhythm regular [Heart Sounds] : normal S1 and S2 [Edema] : there was no peripheral edema [No CVA Tenderness] : no ~M costovertebral angle tenderness [No Spinal Tenderness] : no spinal tenderness [Nail Clubbing] : no clubbing  or cyanosis of the fingernails [Musculoskeletal - Swelling] : no joint swelling seen [Motor Tone] : muscle strength and tone were normal [] : no rash [Motor Exam] : the motor exam was normal [No Focal Deficits] : no focal deficits [Oriented To Time, Place, And Person] : oriented to person, place, and time [Impaired Insight] : insight and judgment were intact [Affect] : the affect was normal [Abdomen Soft] : soft [Abdomen Tenderness] : non-tender [Cervical Lymph Nodes Enlarged Posterior Bilaterally] : posterior cervical [Cervical Lymph Nodes Enlarged Anterior Bilaterally] : anterior cervical [Supraclavicular Lymph Nodes Enlarged Bilaterally] : supraclavicular [FreeTextEntry1] : Strength intact but gait slightly off balance and tentative

## 2022-09-08 ENCOUNTER — NON-APPOINTMENT (OUTPATIENT)
Age: 57
End: 2022-09-08

## 2022-09-08 ENCOUNTER — APPOINTMENT (OUTPATIENT)
Dept: CARDIOLOGY | Facility: CLINIC | Age: 57
End: 2022-09-08

## 2022-09-08 VITALS
OXYGEN SATURATION: 100 % | DIASTOLIC BLOOD PRESSURE: 80 MMHG | RESPIRATION RATE: 16 BRPM | HEIGHT: 64 IN | BODY MASS INDEX: 28.17 KG/M2 | SYSTOLIC BLOOD PRESSURE: 117 MMHG | HEART RATE: 59 BPM | TEMPERATURE: 98.5 F | WEIGHT: 165 LBS

## 2022-09-08 DIAGNOSIS — R06.00 DYSPNEA, UNSPECIFIED: ICD-10-CM

## 2022-09-08 PROCEDURE — 99213 OFFICE O/P EST LOW 20 MIN: CPT | Mod: 25

## 2022-09-08 PROCEDURE — 93000 ELECTROCARDIOGRAM COMPLETE: CPT

## 2022-09-09 PROBLEM — R06.00 DYSPNEA: Status: ACTIVE | Noted: 2022-09-09

## 2022-09-09 LAB
ALBUMIN SERPL ELPH-MCNC: 4.6 G/DL
ALP BLD-CCNC: 125 U/L
ALT SERPL-CCNC: 14 U/L
ANION GAP SERPL CALC-SCNC: 18 MMOL/L
AST SERPL-CCNC: 18 U/L
BASOPHILS # BLD AUTO: 0.02 K/UL
BASOPHILS NFR BLD AUTO: 0.3 %
BILIRUB SERPL-MCNC: 0.3 MG/DL
BUN SERPL-MCNC: 24 MG/DL
CALCIUM SERPL-MCNC: 10.1 MG/DL
CHLORIDE SERPL-SCNC: 105 MMOL/L
CO2 SERPL-SCNC: 23 MMOL/L
CREAT SERPL-MCNC: 1.14 MG/DL
EGFR: 57 ML/MIN/1.73M2
EOSINOPHIL # BLD AUTO: 0.11 K/UL
EOSINOPHIL NFR BLD AUTO: 1.7 %
ESTIMATED AVERAGE GLUCOSE: 120 MG/DL
GLUCOSE SERPL-MCNC: 98 MG/DL
HBA1C MFR BLD HPLC: 5.8 %
HCT VFR BLD CALC: 40.5 %
HGB BLD-MCNC: 12.9 G/DL
IMM GRANULOCYTES NFR BLD AUTO: 0.2 %
LYMPHOCYTES # BLD AUTO: 2.59 K/UL
LYMPHOCYTES NFR BLD AUTO: 41 %
MAGNESIUM SERPL-MCNC: 2.3 MG/DL
MAN DIFF?: NORMAL
MCHC RBC-ENTMCNC: 28.7 PG
MCHC RBC-ENTMCNC: 31.9 GM/DL
MCV RBC AUTO: 90 FL
MONOCYTES # BLD AUTO: 0.68 K/UL
MONOCYTES NFR BLD AUTO: 10.8 %
NEUTROPHILS # BLD AUTO: 2.91 K/UL
NEUTROPHILS NFR BLD AUTO: 46 %
NT-PROBNP SERPL-MCNC: 59 PG/ML
PLATELET # BLD AUTO: 290 K/UL
POTASSIUM SERPL-SCNC: 5.6 MMOL/L
PROT SERPL-MCNC: 7.2 G/DL
RBC # BLD: 4.5 M/UL
RBC # FLD: 15.7 %
SODIUM SERPL-SCNC: 146 MMOL/L
TROPONIN I SERPL-MCNC: <0.01 NG/ML
WBC # FLD AUTO: 6.32 K/UL

## 2022-09-09 NOTE — ASSESSMENT
[FreeTextEntry1] : Assessment:\par Rosaura Elmore is a 56 year old woman, patient of Dr. Simental with past medical history of Normal coronaries (2021), Lacunar CVA (s/p ILR), Cervical stenosis, Fibromyalgia, Anxiety & Depression presents for acute visit regarding chest discomfort. \par \par The patient reports that yesterday while being driven she developed an acute onset of chest discomfort, right jaw discomfort, shortness of breath that overall lasted about 40 minutes, not relieved with nitro SL, eventually relieved with ice pack. She reports a long standing history of symptoms like this with an unremarkable cardiac workup. Chart review indicates recent ILR transmission was negative for arrhythmias. ECG today is sinus bradycardia with nonspecific ST abnormalities, similar to prior ECG. Patient had a false positive nuclear stress test in August 2021 given that subsequent coronary angiogram in September 2021 was consistent with normal coronary arteries. Most recent echo consistent with normal LV and RV systolic function. Symptoms reported to be atypical chest pain per Dr. Gates's last evaluation. Physical exam unremarkable, no signs of congestive heart failure. Vital signs stable.\par \par Recommendations:\par [] Chest discomfort, dyspnea: Will check basic labs, CBC to evaluate for anemia, CMP to evaluate electrolytes, Mg, pro BNP to ensure no volume overload and Troponin to ensure no acute ischemia. Will check echocardiogram to re-evaluate LVEF and valves. Symptoms appear more non-cardiac at this time.\par [] Return to office: Patient requests to follow up with this provider, follow up in 1 week

## 2022-09-09 NOTE — PHYSICAL EXAM
[Normal Conjunctiva] : normal conjunctiva [Normal] : moves all extremities, no focal deficits, normal speech [Appears Anxious] : appears anxious [de-identified] : middle aged woman, anxious  [de-identified] : supple, no carotid bruits b/l  [de-identified] : JVP ~ 7 cm H20, RRR, s1, s2, no murmurs [de-identified] : unlabored respirations, clear lung fields [de-identified] : non-distended [de-identified] : no lower extremity edema  [de-identified] : a

## 2022-09-09 NOTE — HISTORY OF PRESENT ILLNESS
[FreeTextEntry1] : Rosaura Elmore is a 56 year old woman, patient of Dr. Simental with past medical history of Normal coronaries (2021), Lacunar CVA (s/p ILR), Cervical stenosis, Fibromyalgia, Anxiety &  Depression presents for acute visit regarding chest discomfort. \par \par The patient reports that yesterday while being driven she developed an acute onset of chest discomfort, right jaw discomfort, shortness of breath that overall lasted about 40 minutes. She took 1 tab of nitro SL which did not help. She reports when she got home she lay down and put an ice pack on her chest and the symptoms resolved in 5 minutes. Reports a long standing history of symptoms like this with unremarkable workup with her primary cardiologist, Dr. Simental. States that these episodes occur 2-3 times per month. Denies or palpitations or syncope yesterday.

## 2022-09-09 NOTE — REVIEW OF SYSTEMS
[Headache] : no headache [Blurry Vision] : no blurred vision [SOB] : shortness of breath [Chest Discomfort] : chest discomfort [Cough] : no cough [Abdominal Pain] : no abdominal pain [Rash] : no rash [Dizziness] : dizziness [Confusion] : no confusion was observed [Easy Bruising] : no tendency for easy bruising

## 2022-09-09 NOTE — CARDIOLOGY SUMMARY
[de-identified] : ECG (9/8/22): sinus bradycardia, nonspecific ST abnormalities (similar to prior ECG) [de-identified] : Nuclear Stress Test (8/2021): Medium sized defect suggestive of ischemia  [de-identified] : TTE (8/2021): LVEF 63%. Normal RV size and function. Mild MR. No pericardial effusion.  [de-identified] : Coronary angiogram (9/2021): Normal coronary arteries.

## 2022-09-14 ENCOUNTER — NON-APPOINTMENT (OUTPATIENT)
Age: 57
End: 2022-09-14

## 2022-09-14 ENCOUNTER — APPOINTMENT (OUTPATIENT)
Dept: ELECTROPHYSIOLOGY | Facility: CLINIC | Age: 57
End: 2022-09-14

## 2022-09-14 PROCEDURE — G2066: CPT

## 2022-09-14 PROCEDURE — 93298 REM INTERROG DEV EVAL SCRMS: CPT

## 2022-09-15 ENCOUNTER — APPOINTMENT (OUTPATIENT)
Dept: CARDIOLOGY | Facility: CLINIC | Age: 57
End: 2022-09-15

## 2022-09-15 PROCEDURE — 93306 TTE W/DOPPLER COMPLETE: CPT

## 2022-09-16 ENCOUNTER — APPOINTMENT (OUTPATIENT)
Dept: CARDIOLOGY | Facility: CLINIC | Age: 57
End: 2022-09-16

## 2022-09-16 ENCOUNTER — NON-APPOINTMENT (OUTPATIENT)
Age: 57
End: 2022-09-16

## 2022-09-16 VITALS
OXYGEN SATURATION: 100 % | SYSTOLIC BLOOD PRESSURE: 112 MMHG | TEMPERATURE: 97.6 F | HEIGHT: 64 IN | BODY MASS INDEX: 28.17 KG/M2 | DIASTOLIC BLOOD PRESSURE: 78 MMHG | RESPIRATION RATE: 20 BRPM | WEIGHT: 165 LBS | HEART RATE: 60 BPM

## 2022-09-16 DIAGNOSIS — E87.0 HYPEROSMOLALITY AND HYPERNATREMIA: ICD-10-CM

## 2022-09-16 DIAGNOSIS — E87.5 HYPERKALEMIA: ICD-10-CM

## 2022-09-16 DIAGNOSIS — Z87.09 PERSONAL HISTORY OF OTHER DISEASES OF THE RESPIRATORY SYSTEM: ICD-10-CM

## 2022-09-16 DIAGNOSIS — Z87.898 PERSONAL HISTORY OF OTHER SPECIFIED CONDITIONS: ICD-10-CM

## 2022-09-16 PROCEDURE — 93000 ELECTROCARDIOGRAM COMPLETE: CPT

## 2022-09-16 PROCEDURE — 99214 OFFICE O/P EST MOD 30 MIN: CPT | Mod: 25

## 2022-09-17 PROBLEM — E87.0 HYPERNATREMIA: Status: ACTIVE | Noted: 2022-09-17

## 2022-09-17 PROBLEM — Z87.09 HISTORY OF DYSPNEA: Status: ACTIVE | Noted: 2022-09-17

## 2022-09-17 PROBLEM — Z87.898 HISTORY OF CHEST PAIN: Status: ACTIVE | Noted: 2022-09-17

## 2022-09-17 NOTE — PHYSICAL EXAM
[Normal Conjunctiva] : normal conjunctiva [Normal] : moves all extremities, no focal deficits, normal speech [Appears Anxious] : appears anxious [de-identified] : middle aged woman, anxious  [de-identified] : supple [de-identified] : JVP ~ 7 cm H20, RRR, s1, s2, no murmurs [de-identified] : unlabored respirations, clear lung fields [de-identified] : non-distended [de-identified] : no lower extremity edema

## 2022-09-17 NOTE — REVIEW OF SYSTEMS
[Headache] : no headache [Blurry Vision] : no blurred vision [SOB] : no shortness of breath [Chest Discomfort] : no chest discomfort [Lower Ext Edema] : no extremity edema [Cough] : no cough [Abdominal Pain] : no abdominal pain [Rash] : no rash [Dizziness] : no dizziness [Confusion] : no confusion was observed [Easy Bruising] : no tendency for easy bruising

## 2022-09-17 NOTE — ASSESSMENT
[FreeTextEntry1] : Assessment:\par Rosaura Elmore is a 56 year old woman, previously evaluated by Dr. Simental with past medical history of Normal coronaries (2021), Lacunar CVA (s/p ILR), Cervical stenosis, Fibromyalgia, Anxiety & Depression presents for follow up visit.\par \par The patient denies recurrent chest discomfort since last week. ECG consistent with sinus rhythm and known nonspecific ST abnormalities. BP normotensive and physical exam unremarkable. Labs reviewed and notable for mild hypernatremia, hyperkalemia and elevated BUN suggestive of dehydration/hypovolemic state, patient endorses that she did not eat and drink the day of her labs and had decreased PO Intake for about 2 days before labs were drawn. Echocardiogram consistent with normal LV and RV systolic function. Chart review indicates recent ILR transmission was negative for arrhythmias.\par \par Recommendations:\par [] Chest discomfort, dyspnea: Symptoms have not recurred at this time. Troponin negative, makes ACS unlikely. Pro BNP negative, no signs of congestive heart failure. CBC unremarkable. Unremarkable echocardiogram yesterday. Also patient had normal coronary angiogram in 9/2021 which makes obstructive CAD unlikely at this time. Patient to continue to monitor symptoms. Recommended patient to follow up with GI/Dr. Butler due to father's history of stomach cancer\par [] Hypernatremia, Hyperkalemia, elevated BUN: Recommended patient to avoid high potassium foods and to increase water intake. Will repeat labs in 1 week. \par [] Return to office: Patient requests to follow up with this provider, follow up in January 2023. Will review repeat labs over the phone

## 2022-09-17 NOTE — HISTORY OF PRESENT ILLNESS
[FreeTextEntry1] : Rosaura Elmore is a 56 year old woman, previously evaluated by Dr. Simental with past medical history of Normal coronaries (), Lacunar CVA (s/p ILR), Cervical stenosis, Fibromyalgia, Anxiety &  Depression presents for follow up visit.\par \par Since her last visit the patient denies recurrent chest discomfort. Denies palpitations or syncope. Is concerned that her symptoms of occasional epigastric pain may be due to GI etiology, reports father  of stomach cancer.

## 2022-09-17 NOTE — CARDIOLOGY SUMMARY
[de-identified] : ECG (9/8/22): sinus bradycardia, nonspecific ST abnormalities (similar to prior ECG)\par ECG (9/16/22): sinus bradycardia, nonspecific ST abnormalities  [de-identified] : Nuclear Stress Test (8/2021): Medium sized defect suggestive of ischemia  [de-identified] : TTE (8/2021): LVEF 63%. Normal RV size and function. Mild MR. No pericardial effusion. \par TTE (9/2022): LVEF 60-65%. Normal RV size and function. No pericardial effusion.  [de-identified] : Coronary angiogram (9/2021): Normal coronary arteries.

## 2022-09-26 ENCOUNTER — APPOINTMENT (OUTPATIENT)
Dept: PSYCHIATRY | Facility: CLINIC | Age: 57
End: 2022-09-26

## 2022-09-26 PROCEDURE — 99214 OFFICE O/P EST MOD 30 MIN: CPT

## 2022-09-26 RX ORDER — DULOXETINE HYDROCHLORIDE 20 MG/1
20 CAPSULE, DELAYED RELEASE PELLETS ORAL
Qty: 30 | Refills: 0 | Status: DISCONTINUED | COMMUNITY
Start: 2022-06-07 | End: 2022-09-26

## 2022-10-09 LAB
ANION GAP SERPL CALC-SCNC: 21 MMOL/L
BUN SERPL-MCNC: 24 MG/DL
CALCIUM SERPL-MCNC: 10 MG/DL
CHLORIDE SERPL-SCNC: 104 MMOL/L
CO2 SERPL-SCNC: 23 MMOL/L
CREAT SERPL-MCNC: 1.15 MG/DL
EGFR: 57 ML/MIN/1.73M2
GLUCOSE SERPL-MCNC: 83 MG/DL
POTASSIUM SERPL-SCNC: 5.5 MMOL/L
SODIUM SERPL-SCNC: 148 MMOL/L
T4 FREE SERPL-MCNC: 1 NG/DL
THYROGLOB AB SERPL-ACNC: <20 IU/ML
THYROGLOB SERPL-MCNC: <0.2 NG/ML
TSH SERPL-ACNC: 1.71 UIU/ML

## 2022-10-19 ENCOUNTER — APPOINTMENT (OUTPATIENT)
Dept: ELECTROPHYSIOLOGY | Facility: CLINIC | Age: 57
End: 2022-10-19

## 2022-10-19 ENCOUNTER — NON-APPOINTMENT (OUTPATIENT)
Age: 57
End: 2022-10-19

## 2022-10-19 PROCEDURE — G2066: CPT

## 2022-10-19 PROCEDURE — 93298 REM INTERROG DEV EVAL SCRMS: CPT

## 2022-10-25 ENCOUNTER — APPOINTMENT (OUTPATIENT)
Dept: PSYCHIATRY | Facility: CLINIC | Age: 57
End: 2022-10-25

## 2022-10-25 PROCEDURE — 99214 OFFICE O/P EST MOD 30 MIN: CPT

## 2022-10-27 NOTE — PLAN
[No] : No [Medication education provided] : Medication education provided. [Rationale for medication choices, possible risks/precautions, benefits, alternative treatment choices, and consequences of non-treatment discussed] : Rationale for medication choices, possible risks/precautions, benefits, alternative treatment choices, and consequences of non-treatment discussed with patient/family/caregiver  [FreeTextEntry4] : Pt is medication adherent and attends all sessions. \par Defer increase of Cymbalta to 90 mg, will need to consider adjunct (? Deplin). \par As opposed to different family of antidepressant. \par TCA caused weight gain which is a s/e to avoid.  [FreeTextEntry5] : Pt continues in individual psychotherapy with Ainsley Ferguson. \par Pt states she is working on loss with her. Pt is working on validation and relationship conflicts with her mother, and \par basically this has improved. Pt is working hard in therapy and making gains.

## 2022-10-27 NOTE — REASON FOR VISIT
[Patient] : Patient [FreeTextEntry1] : Pt here for management of anxiety and depression in the context of chronic pain.

## 2022-11-11 ENCOUNTER — APPOINTMENT (OUTPATIENT)
Dept: RHEUMATOLOGY | Facility: CLINIC | Age: 57
End: 2022-11-11

## 2022-11-11 VITALS
WEIGHT: 170 LBS | SYSTOLIC BLOOD PRESSURE: 114 MMHG | TEMPERATURE: 96.7 F | HEIGHT: 64 IN | HEART RATE: 84 BPM | DIASTOLIC BLOOD PRESSURE: 82 MMHG | OXYGEN SATURATION: 97 % | RESPIRATION RATE: 16 BRPM | BODY MASS INDEX: 29.02 KG/M2

## 2022-11-11 PROCEDURE — 99213 OFFICE O/P EST LOW 20 MIN: CPT

## 2022-11-11 RX ORDER — DULOXETINE HYDROCHLORIDE 30 MG/1
30 CAPSULE, DELAYED RELEASE PELLETS ORAL
Qty: 30 | Refills: 0 | Status: COMPLETED | COMMUNITY
Start: 2022-09-05

## 2022-11-14 NOTE — REVIEW OF SYSTEMS
[Feeling Poorly] : feeling poorly [Feeling Tired] : feeling tired [Dry Eyes] : dryness of the eyes [Palpitations] : palpitations [Arthralgias] : arthralgias [Joint Pain] : joint pain [Sleep Disturbances] : sleep disturbances [Anxiety] : anxiety [Depression] : depression [As Noted in HPI] : as noted in HPI [Hot Flashes] : hot flashes [Negative] : Heme/Lymph [Joint Swelling] : no joint swelling [Joint Stiffness] : no joint stiffness [Suicidal] : not suicidal [FreeTextEntry3] : + tension headaches

## 2022-11-14 NOTE — PHYSICAL EXAM
[General Appearance - Alert] : alert [General Appearance - In No Acute Distress] : in no acute distress [General Appearance - Well Nourished] : well nourished [Sclera] : the sclera and conjunctiva were normal [PERRL With Normal Accommodation] : pupils were equal in size, round, and reactive to light [Extraocular Movements] : extraocular movements were intact [Outer Ear] : the ears and nose were normal in appearance [Neck Appearance] : the appearance of the neck was normal [Auscultation Breath Sounds / Voice Sounds] : lungs were clear to auscultation bilaterally [Heart Rate And Rhythm] : heart rate was normal and rhythm regular [Heart Sounds] : normal S1 and S2 [Edema] : there was no peripheral edema [Abdomen Soft] : soft [Abdomen Tenderness] : non-tender [Cervical Lymph Nodes Enlarged Posterior Bilaterally] : posterior cervical [Cervical Lymph Nodes Enlarged Anterior Bilaterally] : anterior cervical [Supraclavicular Lymph Nodes Enlarged Bilaterally] : supraclavicular [No CVA Tenderness] : no ~M costovertebral angle tenderness [No Spinal Tenderness] : no spinal tenderness [Nail Clubbing] : no clubbing  or cyanosis of the fingernails [Musculoskeletal - Swelling] : no joint swelling seen [Motor Tone] : muscle strength and tone were normal [] : no rash [Motor Exam] : the motor exam was normal [No Focal Deficits] : no focal deficits [Oriented To Time, Place, And Person] : oriented to person, place, and time [Impaired Insight] : insight and judgment were intact [Affect] : the affect was normal [FreeTextEntry1] : Strength intact but gait slightly off balance and tentative
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
## 2022-11-23 ENCOUNTER — NON-APPOINTMENT (OUTPATIENT)
Age: 57
End: 2022-11-23

## 2022-11-23 ENCOUNTER — APPOINTMENT (OUTPATIENT)
Dept: ELECTROPHYSIOLOGY | Facility: CLINIC | Age: 57
End: 2022-11-23

## 2022-11-23 PROCEDURE — 93298 REM INTERROG DEV EVAL SCRMS: CPT

## 2022-11-23 PROCEDURE — G2066: CPT

## 2022-11-28 ENCOUNTER — APPOINTMENT (OUTPATIENT)
Dept: PSYCHIATRY | Facility: CLINIC | Age: 57
End: 2022-11-28

## 2022-11-28 PROCEDURE — 99214 OFFICE O/P EST MOD 30 MIN: CPT

## 2022-11-28 NOTE — HISTORY OF PRESENT ILLNESS
[FreeTextEntry1] : Pt seen and evaluated today, reports she had an epidural and for two weeks she had some relief from pain. \par She states she feels disappointed that the pain returned , and it is difficult to have relief and then recurrence of symptoms. \par She states she was unable to do things her daughter wanted to do , such as walk her dogs along the beach , she states she had "band like" pain across her forehead and now c/o feeling "fat" across her neck today, she has tried to remain optimistic regarding neck and back pain. \par Pt will try a TENS unit , massage, chiropractor. Pt reports more pain when sitting up. Pt feels she candy best with lying down, and has more pain relief from that. Pt denied being suicidal, "I'm grateful", and states a difficulty would be if she did not have gratitude , has to see an independent provider. Pt has a daughter who is in [place] who is a teacher.

## 2022-11-28 NOTE — PHYSICAL EXAM
[None] : none [Cooperative] : cooperative [Anxious] : anxious [Constricted] : constricted [Clear] : clear [Linear/Goal Directed] : linear/goal directed [Average] : average [WNL] : within normal limits [Feeling Restless] : feeling ~L restless [FreeTextEntry2] : carefully walking into the office.  [FreeTextEntry4] : Pt is restless, tends to wring her hands or massage her neck.  [FreeTextEntry5] : Pt did have her Thyroid medication adjusted.  [FreeTextEntry1] : casually dressed.  [de-identified] : Pt has a supportive spouse.

## 2022-11-28 NOTE — PLAN
[No] : No [Medication education provided] : Medication education provided. [Rationale for medication choices, possible risks/precautions, benefits, alternative treatment choices, and consequences of non-treatment discussed] : Rationale for medication choices, possible risks/precautions, benefits, alternative treatment choices, and consequences of non-treatment discussed with patient/family/caregiver

## 2022-11-28 NOTE — REASON FOR VISIT
[Patient] : Patient [FreeTextEntry1] : Pt here for management for anxiety and depression in the context of chronic pain.

## 2022-11-30 ENCOUNTER — NON-APPOINTMENT (OUTPATIENT)
Age: 57
End: 2022-11-30

## 2022-12-02 LAB
BASOPHILS # BLD AUTO: 0.02 K/UL
BASOPHILS NFR BLD AUTO: 0.2 %
EOSINOPHIL # BLD AUTO: 0.03 K/UL
EOSINOPHIL NFR BLD AUTO: 0.3 %
HCT VFR BLD CALC: 40.5 %
HGB BLD-MCNC: 13.1 G/DL
IMM GRANULOCYTES NFR BLD AUTO: 0.3 %
LYMPHOCYTES # BLD AUTO: 1.56 K/UL
LYMPHOCYTES NFR BLD AUTO: 13.5 %
MAN DIFF?: NORMAL
MCHC RBC-ENTMCNC: 29.9 PG
MCHC RBC-ENTMCNC: 32.3 GM/DL
MCV RBC AUTO: 92.5 FL
MONOCYTES # BLD AUTO: 0.88 K/UL
MONOCYTES NFR BLD AUTO: 7.6 %
NEUTROPHILS # BLD AUTO: 9.01 K/UL
NEUTROPHILS NFR BLD AUTO: 78.1 %
PLATELET # BLD AUTO: 322 K/UL
RBC # BLD: 4.38 M/UL
RBC # FLD: 15.6 %
WBC # FLD AUTO: 11.54 K/UL

## 2022-12-03 LAB
DEPRECATED KAPPA LC FREE/LAMBDA SER: 1.63 RATIO
IGA SER QL IEP: 128 MG/DL
IGG SER QL IEP: 976 MG/DL
IGM SER QL IEP: 140 MG/DL
KAPPA LC CSF-MCNC: 1.11 MG/DL
KAPPA LC SERPL-MCNC: 1.81 MG/DL

## 2022-12-05 ENCOUNTER — RX RENEWAL (OUTPATIENT)
Age: 57
End: 2022-12-05

## 2022-12-06 ENCOUNTER — APPOINTMENT (OUTPATIENT)
Dept: RHEUMATOLOGY | Facility: CLINIC | Age: 57
End: 2022-12-06

## 2022-12-09 LAB — CHRONIC URTICARIA PANEL (CU INDEX): 4.6

## 2022-12-16 LAB
ANION GAP SERPL CALC-SCNC: 10 MMOL/L
BUN SERPL-MCNC: 24 MG/DL
CALCIUM SERPL-MCNC: 9.6 MG/DL
CHLORIDE SERPL-SCNC: 104 MMOL/L
CO2 SERPL-SCNC: 28 MMOL/L
CREAT SERPL-MCNC: 1.12 MG/DL
EGFR: 58 ML/MIN/1.73M2
GLUCOSE SERPL-MCNC: 103 MG/DL
POTASSIUM SERPL-SCNC: 4.3 MMOL/L
SODIUM SERPL-SCNC: 142 MMOL/L
T4 FREE SERPL-MCNC: 1.4 NG/DL
T4 SERPL-MCNC: 7.3 UG/DL
THYROGLOB AB SERPL-ACNC: <20 IU/ML
THYROGLOB SERPL-MCNC: <0.2 NG/ML
TSH SERPL-ACNC: 0.84 UIU/ML

## 2022-12-20 ENCOUNTER — APPOINTMENT (OUTPATIENT)
Dept: ENDOCRINOLOGY | Facility: CLINIC | Age: 57
End: 2022-12-20

## 2022-12-20 VITALS
BODY MASS INDEX: 30.05 KG/M2 | HEIGHT: 64 IN | SYSTOLIC BLOOD PRESSURE: 120 MMHG | WEIGHT: 176 LBS | DIASTOLIC BLOOD PRESSURE: 72 MMHG | OXYGEN SATURATION: 98 % | RESPIRATION RATE: 16 BRPM | TEMPERATURE: 97.8 F | HEART RATE: 85 BPM

## 2022-12-20 PROCEDURE — 99215 OFFICE O/P EST HI 40 MIN: CPT

## 2022-12-20 NOTE — HISTORY OF PRESENT ILLNESS
[FreeTextEntry1] : 57 year old female w/ history of fibromyalgia, depression with history of thyroid cancer here for follow up \par \par In the interim, \par Had Covid in October \par Poison Ivy - ongoing for the past 2 weeks \par \par \par 2006 - History of thyroidectomy ( Pathology is unclear) performed at [facility] with Dr. Claros \par 2007: MADRID administered \par \par She has not had an ultrasound of the neck in 10 years \par WBS was performed post therapy in 2012 and was negative for any uptake \par \par 01/2021: Tg was <0.2 \par \par \par Symptoms: \par -No  HP or tremors \par -Severe constipation ( Taking motegtity and 4 doses of miralax per day) \par -Baseline hair loss \par -Some new tremor \par \par \par \par She is currently on levothyroxine 125 mcg daily \par \par \par On NADH Aand Coenzyme q 10 \par

## 2022-12-20 NOTE — REVIEW OF SYSTEMS
[Fatigue] : no fatigue [Decreased Appetite] : appetite not decreased [Recent Weight Gain (___ Lbs)] : no recent weight gain [Recent Weight Loss (___ Lbs)] : no recent weight loss [Visual Field Defect] : no visual field defect [Dry Eyes] : no dryness [Dysphagia] : no dysphagia [Neck Pain] : no neck pain [Dysphonia] : no dysphonia [Nasal Congestion] : no nasal congestion [Chest Pain] : no chest pain [Slow Heart Rate] : heart rate is not slow [Palpitations] : no palpitations [Fast Heart Rate] : heart rate is not fast [Shortness Of Breath] : no shortness of breath [Cough] : no cough [Nausea] : no nausea [Constipation] : no constipation [Vomiting] : no vomiting [Diarrhea] : no diarrhea [Polyuria] : no polyuria [Dysuria] : no dysuria [Irregular Menses] : regular menses [Joint Pain] : no joint pain [Muscle Weakness] : no muscle weakness [Acanthosis] : no acanthosis  [Acne] : no acne [Headaches] : no headaches [Dizziness] : no dizziness [Tremors] : no tremors [Pain/Numbness of Digits] : no pain/numbness of digits [Depression] : no depression [Polydipsia] : no polydipsia [Cold Intolerance] : no cold intolerance [Easy Bleeding] : no ~M tendency for easy bleeding [Easy Bruising] : no tendency for easy bruising

## 2022-12-20 NOTE — ASSESSMENT
[FreeTextEntry1] : 57 year old female with history of thyroid cancer  s/p thyroidectomy in 2006 and MADRID in 2007. \par Tg in 02/2021 was <0.2 \par \par  \par -Will check Tg, TSH, Ft4 at this time \par -Continue Levothyroxine 125 mcg daily \par -US is YANIQUE \par -TSH goal of 0.5-2.0 \par \par -Follow up in 6 months\par

## 2022-12-29 ENCOUNTER — NON-APPOINTMENT (OUTPATIENT)
Age: 57
End: 2022-12-29

## 2022-12-29 ENCOUNTER — APPOINTMENT (OUTPATIENT)
Dept: ELECTROPHYSIOLOGY | Facility: CLINIC | Age: 57
End: 2022-12-29
Payer: COMMERCIAL

## 2022-12-29 DIAGNOSIS — R92.2 INCONCLUSIVE MAMMOGRAM: ICD-10-CM

## 2022-12-29 DIAGNOSIS — Z92.89 PERSONAL HISTORY OF OTHER MEDICAL TREATMENT: ICD-10-CM

## 2022-12-29 DIAGNOSIS — Z78.9 OTHER SPECIFIED HEALTH STATUS: ICD-10-CM

## 2022-12-29 DIAGNOSIS — Z82.69 FAMILY HISTORY OF OTHER DISEASES OF THE MUSCULOSKELETAL SYSTEM AND CONNECTIVE TISSUE: ICD-10-CM

## 2022-12-29 DIAGNOSIS — N95.2 POSTMENOPAUSAL ATROPHIC VAGINITIS: ICD-10-CM

## 2022-12-29 DIAGNOSIS — Z86.018 PERSONAL HISTORY OF OTHER BENIGN NEOPLASM: ICD-10-CM

## 2022-12-29 DIAGNOSIS — Z80.3 FAMILY HISTORY OF MALIGNANT NEOPLASM OF BREAST: ICD-10-CM

## 2022-12-29 DIAGNOSIS — N60.19 DIFFUSE CYSTIC MASTOPATHY OF UNSPECIFIED BREAST: ICD-10-CM

## 2022-12-29 DIAGNOSIS — Z98.890 OTHER SPECIFIED POSTPROCEDURAL STATES: ICD-10-CM

## 2022-12-29 PROCEDURE — 93298 REM INTERROG DEV EVAL SCRMS: CPT

## 2022-12-29 PROCEDURE — G2066: CPT

## 2023-01-05 ENCOUNTER — NON-APPOINTMENT (OUTPATIENT)
Age: 58
End: 2023-01-05

## 2023-01-05 ENCOUNTER — APPOINTMENT (OUTPATIENT)
Dept: ALLERGY | Facility: CLINIC | Age: 58
End: 2023-01-05
Payer: COMMERCIAL

## 2023-01-05 VITALS
SYSTOLIC BLOOD PRESSURE: 112 MMHG | HEIGHT: 64 IN | OXYGEN SATURATION: 98 % | DIASTOLIC BLOOD PRESSURE: 78 MMHG | HEART RATE: 93 BPM | TEMPERATURE: 98.2 F | BODY MASS INDEX: 29.88 KG/M2 | WEIGHT: 175 LBS

## 2023-01-05 DIAGNOSIS — R21 RASH AND OTHER NONSPECIFIC SKIN ERUPTION: ICD-10-CM

## 2023-01-05 PROCEDURE — 99204 OFFICE O/P NEW MOD 45 MIN: CPT

## 2023-01-05 RX ORDER — PREDNISONE 10 MG/1
10 TABLET ORAL DAILY
Qty: 10 | Refills: 0 | Status: DISCONTINUED | COMMUNITY
Start: 2022-06-16 | End: 2023-01-05

## 2023-01-05 RX ORDER — AMITRIPTYLINE HYDROCHLORIDE 75 MG/1
TABLET, FILM COATED ORAL
Refills: 0 | Status: DISCONTINUED | COMMUNITY
End: 2023-01-05

## 2023-01-05 RX ORDER — POLYETHYLENE GLYCOL 3350 17 G/17G
17 POWDER, FOR SOLUTION ORAL
Refills: 0 | Status: DISCONTINUED | COMMUNITY
Start: 2021-09-02 | End: 2023-01-05

## 2023-01-05 NOTE — HISTORY OF PRESENT ILLNESS
[de-identified] : Patient has had poison ivy 6x this year thru out 2022 - her first episode started in January of 2022 - PCP and rheumatologist were treating her - treated with prednisone for each episode of rash - she sees Dr. Toney - the most recent episode was December 1st at [facility]gent [facility] - rash around her neck and sensation on her tongue - injection of dexamethasone and prednisone x 5 days.   She does report that in the past she improves with prednisone but most not as responsive during the most recent flare up of her symptoms.   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

## 2023-01-05 NOTE — PHYSICAL EXAM
[Alert] : alert [Well Nourished] : well nourished [Healthy Appearance] : healthy appearance [No Acute Distress] : no acute distress [Well Developed] : well developed [Normal Voice/Communication] : normal voice communication [No Neck Mass] : no neck mass was observed [No LAD] : no lymphadenopathy [Normal Rate and Effort] : normal respiratory rhythm and effort [No Crackles] : no crackles [No Retractions] : no retractions [Bilateral Audible Breath Sounds] : bilateral audible breath sounds [Wheezing] : no wheezing was heard [Normal Rate] : heart rate was normal  [Normal S1, S2] : normal S1 and S2 [Regular Rhythm] : with a regular rhythm [de-identified] : scattered mild erythema on forearms  [de-identified] : anxious

## 2023-01-05 NOTE — ASSESSMENT
[FreeTextEntry1] : Recurrent pruritic - erythematous - blistering rash - \par \par Pemphigoid panel \par Patch testing to rule out contact dermatitis\par Patient can continue with antihistamines as needed

## 2023-01-05 NOTE — REVIEW OF SYSTEMS
[Depression] : depression [Anxiety] : anxiety [Nl] : Genitourinary [FreeTextEntry4] : thyroid cancer - Hashimoto's thyroiditis  [FreeTextEntry7] : celiac - endoscopy  [FreeTextEntry9] : fibromyalgia  [de-identified] : insomnia

## 2023-01-05 NOTE — SOCIAL HISTORY
[House] : [unfilled] lives in a house  [Dog] : dog [] :  [FreeTextEntry1] : Lives with spouse\par Not working  [Smokers in Household] : there are no smokers in the home [de-identified] : x2

## 2023-01-17 LAB
BMZ BP180 IGG SERPL-ACNC: <2 RU/ML
BMZ BP230 IGG SERPL-ACNC: <2 RU/ML

## 2023-01-19 ENCOUNTER — APPOINTMENT (OUTPATIENT)
Dept: CARDIOLOGY | Facility: CLINIC | Age: 58
End: 2023-01-19
Payer: COMMERCIAL

## 2023-01-19 ENCOUNTER — NON-APPOINTMENT (OUTPATIENT)
Age: 58
End: 2023-01-19

## 2023-01-19 VITALS
RESPIRATION RATE: 20 BRPM | DIASTOLIC BLOOD PRESSURE: 77 MMHG | HEART RATE: 93 BPM | TEMPERATURE: 97.2 F | BODY MASS INDEX: 29.88 KG/M2 | SYSTOLIC BLOOD PRESSURE: 113 MMHG | OXYGEN SATURATION: 100 % | WEIGHT: 175 LBS | HEIGHT: 64 IN

## 2023-01-19 PROCEDURE — 93000 ELECTROCARDIOGRAM COMPLETE: CPT

## 2023-01-19 PROCEDURE — 99213 OFFICE O/P EST LOW 20 MIN: CPT | Mod: 25

## 2023-01-19 NOTE — PHYSICAL EXAM
[Normal Conjunctiva] : normal conjunctiva [Normal] : moves all extremities, no focal deficits, normal speech [Appears Anxious] : appears anxious [de-identified] : middle aged woman, anxious  [de-identified] : supple [de-identified] : JVP ~ 7 cm H20, RRR, s1, s2, no murmurs [de-identified] : unlabored respirations, clear lung fields [de-identified] : non-distended [de-identified] : no lower extremity edema

## 2023-01-19 NOTE — REVIEW OF SYSTEMS
[Chest Discomfort] : chest discomfort [Headache] : no headache [Blurry Vision] : no blurred vision [SOB] : no shortness of breath [Lower Ext Edema] : no extremity edema [Palpitations] : no palpitations [Syncope] : no syncope [Cough] : no cough [Abdominal Pain] : no abdominal pain [Rash] : no rash [Dizziness] : no dizziness [Confusion] : no confusion was observed [Easy Bruising] : no tendency for easy bruising

## 2023-01-19 NOTE — ASSESSMENT
[FreeTextEntry1] : Assessment:\par Rosaura Elmore is a 57 year old woman, previously evaluated by Dr. Simental with past medical history of Normal coronaries (2021), Lacunar CVA (s/p ILR), Cervical stenosis, Fibromyalgia, Anxiety & Depression presents for follow up visit.\par \par The patient reports intermittent episodes of chest discomfort which occurs at rest and not worse on exertion, no associated dyspnea. ECG today is poor baseline but consistent with normal sinus rhythm and nonspecific ST abnormalities. BP normotensive. Recent 1/2023 labs reviewed and CMP within normal limits. Prior echocardiogram (9/2022) consistent with normal LV and RV systolic function. Coronary angiogram (9/2021) consistent with normal coronary arteries. Chart review indicates recent ILR transmissions were negative for arrhythmias.\par \par Recommendations:\par [] Chest discomfort: Appears atypical. Prior echocardiogram (9/2022) was unremarkable, no regional wall motion abnormalities. In addition, patient had normal coronary angiogram in 9/2021 which makes obstructive CAD unlikely at this time. No signs of arrhythmia on ILR interrogation reports (ILR to be removed 11/2024). Patient to continue to monitor symptoms, unclear if symptoms may also be due to fibromyalgia. The patient did not follow up with GI/Dr. Butler, patient reports possible history of gastritis on prior EGD, recommended patient to follow up with GI, she may require repeat testing. If symptoms persistent will consider repeat non-invasive ischemic testing.\par [] Risk factors: 10 yr ASCVD risk score 1.6% is low,  mg/dl, continue lifestyle modifications\par [] Return to office: 3 months

## 2023-01-19 NOTE — CARDIOLOGY SUMMARY
[de-identified] : ECG (9/8/22): sinus bradycardia, nonspecific ST abnormalities (similar to prior ECG)\par ECG (9/16/22): sinus bradycardia, nonspecific ST abnormalities \par ECG (1/19/23): normal sinus rhythm, nonspecific ST abnormalities, artifact [de-identified] : Nuclear Stress Test (8/2021): Medium sized defect suggestive of ischemia  [de-identified] : TTE (8/2021): LVEF 63%. Normal RV size and function. Mild MR. No pericardial effusion. \par TTE (9/2022): LVEF 60-65%. Normal RV size and function. No pericardial effusion.  [de-identified] : Coronary angiogram (9/2021): Normal coronary arteries.

## 2023-01-19 NOTE — HISTORY OF PRESENT ILLNESS
[FreeTextEntry1] : Rosaura Elmore is a 57 year old woman, previously evaluated by Dr. Simental with past medical history of Normal coronaries (2021), Lacunar CVA (s/p ILR), Cervical stenosis, Fibromyalgia, Anxiety &  Depression presents for follow up visit.\par \par Since her last visit she reports that she has had intermittent episodes of chest discomfort, reports that the pain can start in her jaw and then travel to her chest and usually happens at rest, not worse on exertion. Reports that the chest pain does not occur daily. Denies associated shortness of breath. Denies palpitations. Has not followed up with a gastroenterologist.

## 2023-01-23 ENCOUNTER — APPOINTMENT (OUTPATIENT)
Dept: PSYCHIATRY | Facility: CLINIC | Age: 58
End: 2023-01-23

## 2023-01-27 ENCOUNTER — APPOINTMENT (OUTPATIENT)
Dept: GASTROENTEROLOGY | Facility: CLINIC | Age: 58
End: 2023-01-27
Payer: COMMERCIAL

## 2023-01-27 VITALS
HEART RATE: 89 BPM | DIASTOLIC BLOOD PRESSURE: 61 MMHG | HEIGHT: 64 IN | SYSTOLIC BLOOD PRESSURE: 121 MMHG | TEMPERATURE: 98 F | BODY MASS INDEX: 29.88 KG/M2 | WEIGHT: 175 LBS | RESPIRATION RATE: 19 BRPM | OXYGEN SATURATION: 95 %

## 2023-01-27 DIAGNOSIS — Z80.0 FAMILY HISTORY OF MALIGNANT NEOPLASM OF DIGESTIVE ORGANS: ICD-10-CM

## 2023-01-27 DIAGNOSIS — R63.5 ABNORMAL WEIGHT GAIN: ICD-10-CM

## 2023-01-27 DIAGNOSIS — Z83.79 FAMILY HISTORY OF OTHER DISEASES OF THE DIGESTIVE SYSTEM: ICD-10-CM

## 2023-01-27 PROCEDURE — 99203 OFFICE O/P NEW LOW 30 MIN: CPT

## 2023-01-27 RX ORDER — ESCITALOPRAM OXALATE 5 MG/1
TABLET, FILM COATED ORAL
Refills: 0 | Status: COMPLETED | COMMUNITY
End: 2023-01-27

## 2023-01-27 RX ORDER — PRUCALOPRIDE 2 MG/1
2 TABLET, FILM COATED ORAL
Refills: 0 | Status: COMPLETED | COMMUNITY
Start: 2020-08-24 | End: 2023-01-27

## 2023-01-27 RX ORDER — HYDROXYZINE PAMOATE 100 MG
100 CAPSULE ORAL
Refills: 0 | Status: COMPLETED | COMMUNITY
End: 2023-01-27

## 2023-01-27 RX ORDER — BLOOD SUGAR DIAGNOSTIC
STRIP MISCELLANEOUS
Qty: 100 | Refills: 0 | Status: COMPLETED | COMMUNITY
Start: 2022-09-09 | End: 2023-01-27

## 2023-01-27 RX ORDER — BLOOD-GLUCOSE METER
W/DEVICE EACH MISCELLANEOUS
Qty: 1 | Refills: 0 | Status: COMPLETED | COMMUNITY
Start: 2022-09-09 | End: 2023-01-27

## 2023-01-27 RX ORDER — DULOXETINE HYDROCHLORIDE 60 MG/1
60 CAPSULE, DELAYED RELEASE PELLETS ORAL
Qty: 30 | Refills: 2 | Status: COMPLETED | COMMUNITY
Start: 2022-02-07 | End: 2023-01-27

## 2023-01-27 RX ORDER — KRILL/OM-3/DHA/EPA/PHOSPHO/AST 1000-230MG
81 CAPSULE ORAL
Refills: 0 | Status: COMPLETED | COMMUNITY
Start: 2021-08-31 | End: 2023-01-27

## 2023-01-27 RX ORDER — NITROGLYCERIN 0.4 MG/1
0.4 TABLET SUBLINGUAL
Qty: 30 | Refills: 0 | Status: COMPLETED | COMMUNITY
Start: 2022-07-11 | End: 2023-01-27

## 2023-01-27 RX ORDER — TIMOLOL MALEATE 5 MG/ML
0.5 SOLUTION OPHTHALMIC
Qty: 5 | Refills: 0 | Status: COMPLETED | COMMUNITY
Start: 2021-08-02 | End: 2023-01-27

## 2023-01-27 RX ORDER — HYDROXYZINE HYDROCHLORIDE 50 MG/1
50 TABLET ORAL
Qty: 30 | Refills: 1 | Status: COMPLETED | COMMUNITY
Start: 2021-12-09 | End: 2023-01-27

## 2023-01-27 RX ORDER — LINACLOTIDE 145 UG/1
145 CAPSULE, GELATIN COATED ORAL
Refills: 0 | Status: COMPLETED | COMMUNITY
End: 2023-01-27

## 2023-01-29 LAB
CALCIUM SERPL-MCNC: 9.8 MG/DL
CALCIUM SERPL-MCNC: 9.8 MG/DL
PARATHYROID HORMONE INTACT: 49 PG/ML

## 2023-01-29 NOTE — HISTORY OF PRESENT ILLNESS
[FreeTextEntry1] : Chest pains, intermittent, severe.\par Advised by cardiologist to get GI evaluation.\par History of fibromyalgia.\par ?history of gastric ulcers in the past per patient.\par \par Takes baby aspirin for CVA in 2021. Was taking omeprazole on-off. Takes Linzess for constipation.\par \par Last colonoscopy age 52.\par \par Recent labs showed elevated calcium

## 2023-01-29 NOTE — REVIEW OF SYSTEMS
[Chest Pain] : chest pain [As Noted in HPI] : as noted in HPI [Constipation] : constipation [Negative] : Heme/Lymph [Vomiting] : no vomiting [Melena (black stool)] : no melena

## 2023-01-29 NOTE — PHYSICAL EXAM
[Alert] : alert [Normal Voice/Communication] : normal voice/communication [Healthy Appearing] : healthy appearing [No Acute Distress] : no acute distress [Sclera] : the sclera and conjunctiva were normal [Hearing Threshold Finger Rub Not [place]] : hearing was normal [Normal Lips/Gums] : the lips and gums were normal [Normal Appearance] : the appearance of the neck was normal [Oropharynx] : the oropharynx was normal [No Neck Mass] : no neck mass was observed [No Respiratory Distress] : no respiratory distress [No Acc Muscle Use] : no accessory muscle use [Respiration, Rhythm And Depth] : normal respiratory rhythm and effort [Auscultation Breath Sounds / Voice Sounds] : lungs were clear to auscultation bilaterally [Heart Rate And Rhythm] : heart rate was normal and rhythm regular [Normal S1, S2] : normal S1 and S2 [Murmurs] : no murmurs [Bowel Sounds] : normal bowel sounds [Abdomen Tenderness] : non-tender [No Masses] : no abdominal mass palpated [Abdomen Soft] : soft [No CVA Tenderness] : no CVA  tenderness [No Clubbing, Cyanosis] : no clubbing or cyanosis of the fingernails [] : no rash [No Focal Deficits] : no focal deficits [Oriented To Time, Place, And Person] : oriented to person, place, and time

## 2023-01-29 NOTE — ASSESSMENT
[FreeTextEntry1] : EGD recommended for evaluation of GI cause of chest discomfort.\par Explained the risks, benefits, indications, alternatives. The risks include but are not limited to bleeding, infection, perforation, reaction to anesthesia, or missed lesions. The patient verbalized understanding and wishes to proceed.\par \par Take omeprazole.\par \par Continue Linzess.\par  \par Check PTH and calcium again.\par

## 2023-01-30 ENCOUNTER — APPOINTMENT (OUTPATIENT)
Dept: ALLERGY | Facility: CLINIC | Age: 58
End: 2023-01-30
Payer: COMMERCIAL

## 2023-01-30 ENCOUNTER — APPOINTMENT (OUTPATIENT)
Dept: PEDIATRIC ALLERGY IMMUNOLOGY | Facility: CLINIC | Age: 58
End: 2023-01-30

## 2023-01-30 VITALS
HEIGHT: 64 IN | BODY MASS INDEX: 29.88 KG/M2 | SYSTOLIC BLOOD PRESSURE: 102 MMHG | HEART RATE: 108 BPM | DIASTOLIC BLOOD PRESSURE: 70 MMHG | TEMPERATURE: 97.8 F | WEIGHT: 175 LBS | OXYGEN SATURATION: 96 %

## 2023-01-30 DIAGNOSIS — Z01.812 ENCOUNTER FOR PREPROCEDURAL LABORATORY EXAMINATION: ICD-10-CM

## 2023-01-30 PROCEDURE — 95044 PATCH/APPLICATION TESTS: CPT

## 2023-02-01 ENCOUNTER — APPOINTMENT (OUTPATIENT)
Dept: ALLERGY | Facility: CLINIC | Age: 58
End: 2023-02-01
Payer: COMMERCIAL

## 2023-02-01 ENCOUNTER — NON-APPOINTMENT (OUTPATIENT)
Age: 58
End: 2023-02-01

## 2023-02-01 PROCEDURE — 99212 OFFICE O/P EST SF 10 MIN: CPT

## 2023-02-01 NOTE — ASSESSMENT
[FreeTextEntry1] : Contact Dermatitis\par \par RV 72 hour patch results \par \par This visit was for 15 minutes with 80% of the time devoted to face-to-face counseling and coordination of care and 20% of the time devoted to review of medical records/lab results/ordering labs and other tests/speaking to patient and family members/writing the note.\par

## 2023-02-01 NOTE — REASON FOR VISIT
"History & Physical    SUBJECTIVE:     Chief Complaint:  patient was referred to us by her pediatrician  for evaluation of a sacral dimple.      History of Present Illness:  This is a 14-month-old 1 at 37 weeks gestational age.  Patient was noted to have a sacral dimple just at the gluteal fold.  S fairly prominent.  Patient had a lumbar ultrasound and then had MRI scan of the whole spine.  MRI scan does not show low lying conus nor did it show the cord.  Baby's head new drainage from it no signs of meningitis.  No neurologic issues however family's noted something "growing" from the area that looks like a skin tag.            Review of patient's allergies indicates:  No Known Allergies    No current outpatient medications on file.     No current facility-administered medications for this visit.        No past medical history on file.  Past Surgical History:   Procedure Laterality Date    CIRCUMCISION       Family History     Problem Relation (Age of Onset)    Hypertension Mother    No Known Problems Maternal Grandmother, Maternal Grandfather        Social History     Socioeconomic History    Marital status: Single     Spouse name: Not on file    Number of children: Not on file    Years of education: Not on file    Highest education level: Not on file   Occupational History    Not on file   Social Needs    Financial resource strain: Not on file    Food insecurity:     Worry: Not on file     Inability: Not on file    Transportation needs:     Medical: Not on file     Non-medical: Not on file   Tobacco Use    Smoking status: Never Smoker    Smokeless tobacco: Never Used   Substance and Sexual Activity    Alcohol use: Not on file    Drug use: Not on file    Sexual activity: Not on file   Lifestyle    Physical activity:     Days per week: Not on file     Minutes per session: Not on file    Stress: Not on file   Relationships    Social connections:     Talks on phone: Not on file     Gets " together: Not on file     Attends Synagogue service: Not on file     Active member of club or organization: Not on file     Attends meetings of clubs or organizations: Not on file     Relationship status: Not on file   Other Topics Concern    Not on file   Social History Narrative    Not on file       Review of Systems:  Review of Systems   Constitutional: Negative.    HENT: Negative.    Eyes: Negative.    Respiratory: Negative.    Cardiovascular: Negative.    Gastrointestinal: Negative.    Endocrine: Negative.    Genitourinary: Negative.    Musculoskeletal: Negative.    Skin: Negative.    Allergic/Immunologic: Negative.    Neurological: Negative.    Hematological: Negative.    Psychiatric/Behavioral: Negative.        OBJECTIVE:     Vital Signs     There is no height or weight on file to calculate BMI.      Physical Exam:  Physical Exam:    Constitutional: He appears well-developed and well-nourished.     Is able to see the sacral dimple looks like it is just below the gluteal fold.  No active drainage.  No abnormal area of hair or hemangioma.  There is no other obvious midline abnormalities.  Patient appears to be moving all 4 extremities equally and symmetrically.  The legs do not look asymmetric.      Diagnostic Results:  I reviewed the MRI scan with the family answered the questions.    ASSESSMENT/PLAN:     Overall I think this is just a deep will of a sacral dimple no evidence of a did dermal sinus tract.  No evidence of tethered cord.  My quite sure with make a slight skin tag that is growing right around the area of of the dimple but will have the pediatrician keep a close eye on it.  I do not see any further need for any more neurosurgical imaging or follow-up.        Note dictated with voice recognition software, please excuse any grammatical errors.   [Routine Follow-Up] : a routine follow-up visit for

## 2023-02-02 ENCOUNTER — APPOINTMENT (OUTPATIENT)
Dept: GASTROENTEROLOGY | Facility: HOSPITAL | Age: 58
End: 2023-02-02

## 2023-02-02 ENCOUNTER — APPOINTMENT (OUTPATIENT)
Dept: ALLERGY | Facility: CLINIC | Age: 58
End: 2023-02-02
Payer: COMMERCIAL

## 2023-02-02 ENCOUNTER — OUTPATIENT (OUTPATIENT)
Dept: OUTPATIENT SERVICES | Facility: HOSPITAL | Age: 58
LOS: 1 days | Discharge: HOME | End: 2023-02-02
Payer: COMMERCIAL

## 2023-02-02 ENCOUNTER — RESULT REVIEW (OUTPATIENT)
Age: 58
End: 2023-02-02

## 2023-02-02 ENCOUNTER — APPOINTMENT (OUTPATIENT)
Dept: ELECTROPHYSIOLOGY | Facility: CLINIC | Age: 58
End: 2023-02-02
Payer: COMMERCIAL

## 2023-02-02 ENCOUNTER — APPOINTMENT (OUTPATIENT)
Dept: ALLERGY | Facility: CLINIC | Age: 58
End: 2023-02-02

## 2023-02-02 DIAGNOSIS — Z98.890 OTHER SPECIFIED POSTPROCEDURAL STATES: Chronic | ICD-10-CM

## 2023-02-02 DIAGNOSIS — R07.89 OTHER CHEST PAIN: ICD-10-CM

## 2023-02-02 DIAGNOSIS — E89.0 POSTPROCEDURAL HYPOTHYROIDISM: Chronic | ICD-10-CM

## 2023-02-02 PROCEDURE — 88312 SPECIAL STAINS GROUP 1: CPT | Mod: 26

## 2023-02-02 PROCEDURE — 43239 EGD BIOPSY SINGLE/MULTIPLE: CPT

## 2023-02-02 PROCEDURE — 93298 REM INTERROG DEV EVAL SCRMS: CPT

## 2023-02-02 PROCEDURE — G2066: CPT

## 2023-02-02 PROCEDURE — 88305 TISSUE EXAM BY PATHOLOGIST: CPT | Mod: 26

## 2023-02-02 PROCEDURE — 99213 OFFICE O/P EST LOW 20 MIN: CPT

## 2023-02-02 PROCEDURE — 88305 TISSUE EXAM BY PATHOLOGIST: CPT

## 2023-02-02 PROCEDURE — 88312 SPECIAL STAINS GROUP 1: CPT

## 2023-02-02 RX ORDER — SODIUM CHLORIDE 9 MG/ML
500 INJECTION INTRAMUSCULAR; INTRAVENOUS; SUBCUTANEOUS
Refills: 0 | Status: COMPLETED | OUTPATIENT
Start: 2023-02-02 | End: 2023-02-02

## 2023-02-02 RX ADMIN — SODIUM CHLORIDE 75 MILLILITER(S): 9 INJECTION INTRAMUSCULAR; INTRAVENOUS; SUBCUTANEOUS at 08:55

## 2023-02-02 NOTE — IMPRESSION
[Positive] : Patch - positive [FreeTextEntry2] : Propolis, Iodopropynyl butylcarbamate - gold sodium thiosulfate   +1

## 2023-02-02 NOTE — ASSESSMENT
[FreeTextEntry1] : Contact Dermatitis:\par \par I have reviewed avoidance measures with patient\par \par This visit was for 20 minutes with 80% of the time devoted to face-to-face counseling and coordination of care and 20% of the time devoted to review of medical records/lab results/ordering labs and other tests/speaking to patient and family members/writing the note.\par

## 2023-02-03 ENCOUNTER — TRANSCRIPTION ENCOUNTER (OUTPATIENT)
Age: 58
End: 2023-02-03

## 2023-02-06 ENCOUNTER — APPOINTMENT (OUTPATIENT)
Dept: FAMILY MEDICINE | Facility: CLINIC | Age: 58
End: 2023-02-06
Payer: COMMERCIAL

## 2023-02-06 VITALS
BODY MASS INDEX: 29.7 KG/M2 | TEMPERATURE: 98.4 F | HEART RATE: 105 BPM | WEIGHT: 173 LBS | DIASTOLIC BLOOD PRESSURE: 84 MMHG | RESPIRATION RATE: 19 BRPM | SYSTOLIC BLOOD PRESSURE: 118 MMHG | OXYGEN SATURATION: 96 %

## 2023-02-06 LAB — SURGICAL PATHOLOGY STUDY: SIGNIFICANT CHANGE UP

## 2023-02-06 PROCEDURE — 99214 OFFICE O/P EST MOD 30 MIN: CPT

## 2023-02-06 NOTE — HISTORY OF PRESENT ILLNESS
[FreeTextEntry8] : Rosaura presents c/o sleepiness during the day but takes vistaril and cymbalta at night to sleep. Fatigue has been worse since 1/2023. Gained 50 pounds over the past 2 years. Has been feeling more down recently, no SI/HI. See chiropractor for her pain. Sees Dr. Alonso regularly. \par \par ROS: negative except as noted above\par

## 2023-02-06 NOTE — ASSESSMENT
[FreeTextEntry1] : recent blood work reviewed extensively with patient during visit\par recommend early morning and 12 or 1 pm sunlight exposure for as long as possible\par try to avoid vistaril to improve quality of your sleep\par recommend starting swimming in the winter as patient enjoys swimming in the summer\par blood work ordered, await results\par consider alpha stim or [employer] Lackey Stimulator/OAK, recommend discuss with psychiatrist\par RTO 3 months for f/u with PMD

## 2023-02-06 NOTE — PHYSICAL EXAM
[No Acute Distress] : no acute distress [Well Nourished] : well nourished [Well Developed] : well developed [Well-Appearing] : well-appearing [Normal Voice/Communication] : normal voice/communication [Normal Sclera/Conjunctiva] : normal sclera/conjunctiva [Normal Outer Ear/Nose] : the outer ears and nose were normal in appearance [Supple] : supple [No Respiratory Distress] : no respiratory distress  [No Accessory Muscle Use] : no accessory muscle use [Clear to Auscultation] : lungs were clear to auscultation bilaterally [Normal Rate] : normal rate  [Regular Rhythm] : with a regular rhythm [Normal S1, S2] : normal S1 and S2 [Speech Grossly Normal] : speech grossly normal [Memory Grossly Normal] : memory grossly normal [Normal Affect] : the affect was normal [Alert and Oriented x3] : oriented to person, place, and time [Normal Mood] : the mood was normal [Normal Insight/Judgement] : insight and judgment were intact

## 2023-02-07 ENCOUNTER — NON-APPOINTMENT (OUTPATIENT)
Age: 58
End: 2023-02-07

## 2023-02-07 LAB
25(OH)D3 SERPL-MCNC: 39.8 NG/ML
BASOPHILS # BLD AUTO: 0.03 K/UL
BASOPHILS NFR BLD AUTO: 0.5 %
EOSINOPHIL # BLD AUTO: 0.12 K/UL
EOSINOPHIL NFR BLD AUTO: 1.9 %
FOLATE SERPL-MCNC: 6.8 NG/ML
HCT VFR BLD CALC: 42.2 %
HGB BLD-MCNC: 13.7 G/DL
IMM GRANULOCYTES NFR BLD AUTO: 0.3 %
LYMPHOCYTES # BLD AUTO: 1.99 K/UL
LYMPHOCYTES NFR BLD AUTO: 32.1 %
MAN DIFF?: NORMAL
MCHC RBC-ENTMCNC: 28.8 PG
MCHC RBC-ENTMCNC: 32.5 GM/DL
MCV RBC AUTO: 88.8 FL
MONOCYTES # BLD AUTO: 0.78 K/UL
MONOCYTES NFR BLD AUTO: 12.6 %
NEUTROPHILS # BLD AUTO: 3.25 K/UL
NEUTROPHILS NFR BLD AUTO: 52.6 %
PLATELET # BLD AUTO: 309 K/UL
RBC # BLD: 4.75 M/UL
RBC # FLD: 14.1 %
VIT B12 SERPL-MCNC: 194 PG/ML
WBC # FLD AUTO: 6.19 K/UL

## 2023-02-08 ENCOUNTER — APPOINTMENT (OUTPATIENT)
Dept: FAMILY MEDICINE | Facility: CLINIC | Age: 58
End: 2023-02-08
Payer: COMMERCIAL

## 2023-02-08 VITALS
WEIGHT: 173 LBS | OXYGEN SATURATION: 96 % | SYSTOLIC BLOOD PRESSURE: 115 MMHG | HEIGHT: 64 IN | BODY MASS INDEX: 29.53 KG/M2 | DIASTOLIC BLOOD PRESSURE: 79 MMHG | HEART RATE: 100 BPM | RESPIRATION RATE: 19 BRPM

## 2023-02-08 PROCEDURE — 96372 THER/PROPH/DIAG INJ SC/IM: CPT

## 2023-02-08 RX ADMIN — CYANOCOBALAMIN 0 MCG/ML: 1000 INJECTION INTRAMUSCULAR; SUBCUTANEOUS at 00:00

## 2023-02-08 NOTE — ASSESSMENT
[FreeTextEntry1] : nasocobal written on rx for patient\par RBA discussed \par do not take if hot food/liquid 1 hour before or after, must wait until greater than 1 hour\par repeat blood work in 1 month approximately

## 2023-02-10 LAB — SARS-COV-2 N GENE NPH QL NAA+PROBE: NOT DETECTED

## 2023-02-27 ENCOUNTER — APPOINTMENT (OUTPATIENT)
Dept: PSYCHIATRY | Facility: CLINIC | Age: 58
End: 2023-02-27
Payer: COMMERCIAL

## 2023-02-27 PROCEDURE — 99214 OFFICE O/P EST MOD 30 MIN: CPT

## 2023-02-27 NOTE — HISTORY OF PRESENT ILLNESS
[FreeTextEntry1] : Pt reports she was sleeping most of the time in January. \par Pt reports currently she is feeling better, with exception of having migraine h/a. \par ? if pt has parathyroid issues. ( labs checked repeat Ca , was wnl). \par Pt stopped her PPI and Linzess. \par \par Pt reports that her children are doing well, and that another daughter is going to do her post doctorate studies at [place]. \par Pt reports she has had an "empty nest" for 10 years, but states that her life is going well, and that she does not feel her medical issues are connected to her emotional ones.

## 2023-02-27 NOTE — PAST MEDICAL HISTORY
[FreeTextEntry1] : Pt reports she has a hiatal hernia and chronic gastritis. \par Pt reports she has started a new work out routine.

## 2023-02-27 NOTE — PHYSICAL EXAM
[None] : none [Cooperative] : cooperative [Anxious] : anxious [Full] : full [Clear] : clear [Linear/Goal Directed] : linear/goal directed [Average] : average [WNL] : within normal limits [FreeTextEntry1] : Pt c/o migraines and looks visibly uncomfortable.

## 2023-02-27 NOTE — DISCUSSION/SUMMARY
[FreeTextEntry1] : Pt states she prefers to  stay on Duloxetine, due to worries she would have return of suicidal thoughts. \par Reports that she is doing well today, no thoughts of self or other harm. \par Pt states once she got B12 injections she felt better, and was able to lose 10 lbs.

## 2023-03-02 ENCOUNTER — APPOINTMENT (OUTPATIENT)
Dept: FAMILY MEDICINE | Facility: CLINIC | Age: 58
End: 2023-03-02
Payer: COMMERCIAL

## 2023-03-02 ENCOUNTER — TRANSCRIPTION ENCOUNTER (OUTPATIENT)
Age: 58
End: 2023-03-02

## 2023-03-02 VITALS
OXYGEN SATURATION: 97 % | DIASTOLIC BLOOD PRESSURE: 73 MMHG | BODY MASS INDEX: 29.18 KG/M2 | TEMPERATURE: 98.8 F | WEIGHT: 170 LBS | RESPIRATION RATE: 18 BRPM | HEART RATE: 102 BPM | SYSTOLIC BLOOD PRESSURE: 106 MMHG

## 2023-03-02 DIAGNOSIS — R14.0 ABDOMINAL DISTENSION (GASEOUS): ICD-10-CM

## 2023-03-02 PROCEDURE — 96372 THER/PROPH/DIAG INJ SC/IM: CPT

## 2023-03-02 RX ORDER — CYANOCOBALAMIN 1000 UG/ML
1000 INJECTION INTRAMUSCULAR; SUBCUTANEOUS
Qty: 0 | Refills: 0 | Status: COMPLETED | OUTPATIENT
Start: 2023-03-02

## 2023-03-02 RX ADMIN — CYANOCOBALAMIN 0 MCG/ML: 1000 INJECTION, SOLUTION INTRAMUSCULAR; SUBCUTANEOUS at 00:00

## 2023-03-07 ENCOUNTER — NON-APPOINTMENT (OUTPATIENT)
Age: 58
End: 2023-03-07

## 2023-03-09 ENCOUNTER — APPOINTMENT (OUTPATIENT)
Dept: ELECTROPHYSIOLOGY | Facility: CLINIC | Age: 58
End: 2023-03-09
Payer: COMMERCIAL

## 2023-03-09 ENCOUNTER — OUTPATIENT (OUTPATIENT)
Dept: OUTPATIENT SERVICES | Facility: HOSPITAL | Age: 58
LOS: 1 days | End: 2023-03-09
Payer: COMMERCIAL

## 2023-03-09 ENCOUNTER — NON-APPOINTMENT (OUTPATIENT)
Age: 58
End: 2023-03-09

## 2023-03-09 ENCOUNTER — APPOINTMENT (OUTPATIENT)
Dept: ULTRASOUND IMAGING | Facility: HOSPITAL | Age: 58
End: 2023-03-09
Payer: COMMERCIAL

## 2023-03-09 DIAGNOSIS — Z98.890 OTHER SPECIFIED POSTPROCEDURAL STATES: Chronic | ICD-10-CM

## 2023-03-09 DIAGNOSIS — E89.0 POSTPROCEDURAL HYPOTHYROIDISM: Chronic | ICD-10-CM

## 2023-03-09 DIAGNOSIS — R14.0 ABDOMINAL DISTENSION (GASEOUS): ICD-10-CM

## 2023-03-09 PROCEDURE — G2066: CPT

## 2023-03-09 PROCEDURE — 76700 US EXAM ABDOM COMPLETE: CPT

## 2023-03-09 PROCEDURE — 76700 US EXAM ABDOM COMPLETE: CPT | Mod: 26

## 2023-03-09 PROCEDURE — 93298 REM INTERROG DEV EVAL SCRMS: CPT

## 2023-03-10 ENCOUNTER — NON-APPOINTMENT (OUTPATIENT)
Age: 58
End: 2023-03-10

## 2023-03-17 ENCOUNTER — APPOINTMENT (OUTPATIENT)
Dept: RHEUMATOLOGY | Facility: CLINIC | Age: 58
End: 2023-03-17
Payer: COMMERCIAL

## 2023-03-17 VITALS
OXYGEN SATURATION: 98 % | TEMPERATURE: 97.2 F | HEIGHT: 64 IN | HEART RATE: 96 BPM | DIASTOLIC BLOOD PRESSURE: 78 MMHG | SYSTOLIC BLOOD PRESSURE: 110 MMHG | WEIGHT: 167 LBS | RESPIRATION RATE: 18 BRPM | BODY MASS INDEX: 28.51 KG/M2

## 2023-03-17 PROCEDURE — 99213 OFFICE O/P EST LOW 20 MIN: CPT

## 2023-03-19 NOTE — ASSESSMENT
[FreeTextEntry1] : FAISAL MOFFETT is a 57 year old woman with prior dx of FMS which I agree with given sx as above and exam with diffuse soft tissue tender points, skin hypersensitivity, appearing fatigued. Pt also with mild dry eyes and dry mouth chronically, slightly imbalanced gait and with hx of issues with balance and falls. Remains with paucity of inflammatory arthritis or CTD sx. Responsive to low dose prednisone for markedly worse FMS sx but other medical issues making it not an ideal long term med. Did not tolerate trial of Savella. Nerve blocks are helping partially, also now established with psych who is helping to guide medication. \par \par # FMS \par - c/w duloxetine 60mg \par - c/w lyrica - she is using it variably between 1-3x /day which she feels is working well - Reference #: [id number]\par - c/w low dose prednisone PRN sparingly for severe flares - reports she hasn't had to use\par - I remain on board with any changes/input from psychiatry as I suspect this is a concomitant problem. \par - will see how much improvement LAURA provides as well - encouraged to consider last injection \par - c/w stretching, light aerobic exercises, massage, heat as adjuncts for FMS pain. \par - c/w nonpharmacologic FMS therapies as well - encouraged to incorporate small activities that she finds beneficial during her day, optimize stretching and light exercise, and be mindful of mood, be aware not to overextend on days she has less pain, ensure adequate rest between strenuous activities. \par - she remains too symptomatic to work\par \par # ?concomitant inflammatory process \par - reviewed lupus and RA w/u -- all negative \par - will clinically monitor -- no sx today \par \par # papular rash in fixed locations -- appears allergic, now improved \par \par # Chronic B12 deficiency and wosening fatigue\par - labs as below \par \par RTC in 4 months
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
## 2023-03-19 NOTE — DATA REVIEWED
[FreeTextEntry1] : Available notes, and pertinent labs & imaging in EMR reviewed. \par \par ZARINA, RF negative in 2020. Screening again negative in 2022

## 2023-03-19 NOTE — PHYSICAL EXAM
[General Appearance - Alert] : alert [General Appearance - In No Acute Distress] : in no acute distress [General Appearance - Well Nourished] : well nourished [Sclera] : the sclera and conjunctiva were normal [PERRL With Normal Accommodation] : pupils were equal in size, round, and reactive to light [Extraocular Movements] : extraocular movements were intact [Outer Ear] : the ears and nose were normal in appearance [Neck Appearance] : the appearance of the neck was normal [Auscultation Breath Sounds / Voice Sounds] : lungs were clear to auscultation bilaterally [Heart Rate And Rhythm] : heart rate was normal and rhythm regular [Heart Sounds] : normal S1 and S2 [Edema] : there was no peripheral edema [No CVA Tenderness] : no ~M costovertebral angle tenderness [No Spinal Tenderness] : no spinal tenderness [Nail Clubbing] : no clubbing  or cyanosis of the fingernails [Musculoskeletal - Swelling] : no joint swelling seen [Motor Tone] : muscle strength and tone were normal [Motor Exam] : the motor exam was normal [No Focal Deficits] : no focal deficits [Oriented To Time, Place, And Person] : oriented to person, place, and time [] : no rash [FreeTextEntry1] : A

## 2023-03-19 NOTE — REVIEW OF SYSTEMS
[Feeling Poorly] : feeling poorly [Feeling Tired] : feeling tired [Dry Eyes] : dryness of the eyes [Arthralgias] : arthralgias [Joint Pain] : joint pain [Sleep Disturbances] : sleep disturbances [Anxiety] : anxiety [Depression] : depression [As Noted in HPI] : as noted in HPI [Hot Flashes] : hot flashes [Joint Swelling] : no joint swelling [Joint Stiffness] : no joint stiffness [Suicidal] : not suicidal [Negative] : Integumentary [FreeTextEntry3] : + tension headaches

## 2023-03-28 ENCOUNTER — APPOINTMENT (OUTPATIENT)
Dept: PSYCHIATRY | Facility: CLINIC | Age: 58
End: 2023-03-28
Payer: COMMERCIAL

## 2023-03-28 PROCEDURE — 99214 OFFICE O/P EST MOD 30 MIN: CPT

## 2023-03-28 NOTE — PHYSICAL EXAM
[Well groomed] : well groomed [Cooperative] : cooperative [Euthymic] : euthymic [Full] : full [Clear] : clear [Linear/Goal Directed] : linear/goal directed [None] : none [None Reported] : none reported [Average] : average [WNL] : within normal limits [de-identified] : PT with a flare of diverticulitis.

## 2023-03-28 NOTE — HISTORY OF PRESENT ILLNESS
[FreeTextEntry1] : Pt medication adherent, discussed potential of tapering, but for now would want more months of sx remission. \par She had managed to lose 15 lbs, will continue current medication, appreciate rheumatology notes. \par

## 2023-03-28 NOTE — DISCUSSION/SUMMARY
[FreeTextEntry1] : Pt is a 57 year old female who presents with anxiety and depression in the context of chronic pain. \par Plan was to taper off TCA and start SNRI, pt with fair results from  Cymbalta. \par Will continue current management, pt did want to have option  to decrease Vistaril to 25 mg, dosage adjusted.

## 2023-04-13 ENCOUNTER — NON-APPOINTMENT (OUTPATIENT)
Age: 58
End: 2023-04-13

## 2023-04-13 ENCOUNTER — APPOINTMENT (OUTPATIENT)
Dept: ELECTROPHYSIOLOGY | Facility: CLINIC | Age: 58
End: 2023-04-13
Payer: COMMERCIAL

## 2023-04-13 PROCEDURE — 93298 REM INTERROG DEV EVAL SCRMS: CPT

## 2023-04-13 PROCEDURE — G2066: CPT

## 2023-04-20 ENCOUNTER — APPOINTMENT (OUTPATIENT)
Dept: CARDIOLOGY | Facility: CLINIC | Age: 58
End: 2023-04-20
Payer: COMMERCIAL

## 2023-04-20 VITALS
DIASTOLIC BLOOD PRESSURE: 80 MMHG | HEIGHT: 64 IN | BODY MASS INDEX: 28.85 KG/M2 | RESPIRATION RATE: 17 BRPM | SYSTOLIC BLOOD PRESSURE: 115 MMHG | WEIGHT: 169 LBS | TEMPERATURE: 97.6 F | OXYGEN SATURATION: 99 % | HEART RATE: 93 BPM

## 2023-04-20 DIAGNOSIS — K29.70 GASTRITIS, UNSPECIFIED, W/OUT BLEEDING: ICD-10-CM

## 2023-04-20 DIAGNOSIS — R07.9 CHEST PAIN, UNSPECIFIED: ICD-10-CM

## 2023-04-20 PROCEDURE — 93000 ELECTROCARDIOGRAM COMPLETE: CPT

## 2023-04-20 PROCEDURE — 99213 OFFICE O/P EST LOW 20 MIN: CPT | Mod: 25

## 2023-04-21 ENCOUNTER — NON-APPOINTMENT (OUTPATIENT)
Age: 58
End: 2023-04-21

## 2023-04-21 PROBLEM — R07.9 CHEST PAIN: Status: ACTIVE | Noted: 2022-09-08

## 2023-04-21 PROBLEM — K29.70 GASTRITIS: Status: ACTIVE | Noted: 2023-04-21

## 2023-04-21 NOTE — CARDIOLOGY SUMMARY
[de-identified] : ECG (9/8/22): sinus bradycardia, nonspecific ST abnormalities (similar to prior ECG)\par ECG (9/16/22): sinus bradycardia, nonspecific ST abnormalities \par ECG (1/19/23): normal sinus rhythm, nonspecific ST abnormalities, artifact\par ECG (4/20/23): normal sinus rhythm, nonspecific ST abnormalities  [de-identified] : Nuclear Stress Test (8/2021): Medium sized defect suggestive of ischemia  [de-identified] : TTE (8/2021): LVEF 63%. Normal RV size and function. Mild MR. No pericardial effusion. \par TTE (9/2022): LVEF 60-65%. Normal RV size and function. No pericardial effusion.  [de-identified] : Coronary angiogram (9/2021): Normal coronary arteries.

## 2023-04-21 NOTE — HISTORY OF PRESENT ILLNESS
[FreeTextEntry1] : Rosaura Elmore is a 57 year old woman, previously evaluated by Dr. Simental with past medical history of Normal coronaries (2021), Lacunar CVA (s/p ILR), Cervical stenosis, Fibromyalgia, Anxiety &  Depression presents for follow up visit.\par \par Since her last visit she was evaluated by gastroenterology and had EGD done consistent with gastritis and hiatal hernia. She reports occasional episodes of sporadic chest pain, the pain can also start in her neck. Denies shortness of breath.

## 2023-04-21 NOTE — ASSESSMENT
[FreeTextEntry1] : Assessment:\par Rosaura Elmore is a 57 year old woman, previously evaluated by Dr. Simental with past medical history of Normal coronaries (2021), Lacunar CVA (s/p ILR), Cervical stenosis, Fibromyalgia, Anxiety & Depression presents for follow up visit.\par \par Since her last visit the patient had EGD done consistent with gastritis and hiatal hernia. She continues to experience sporadic episodes of chest discomfort, not worse on exertion and not associated with dyspnea. ECG today consistent with sinus rhythm and nonspecific ST abnormalities. BP normotensive. Prior echocardiogram (9/2022) consistent with normal LV and RV systolic function. Coronary angiogram (9/2021) consistent with normal coronary arteries. Chart review indicates recent ILR transmissions were negative for arrhythmias. Symptoms are atypical for cardiac causes and may be due to fibromyalgia and gastritis.\par \par Recommendations:\par [] Chest discomfort: Appears atypical. Prior echocardiogram (9/2022) was unremarkable, no regional wall motion abnormalities. In addition, patient had normal coronary angiogram in 9/2021 which makes obstructive CAD unlikely at this time. No signs of arrhythmia on ILR interrogation reports (ILR to be removed 11/2024). Discussed option of repeat stress testing but patient prefers to hold off at this time, the likelihood of new CAD appears low.\par [] Risk factors: 10 yr ASCVD risk score 1.6% is low,  mg/dl, continue lifestyle modifications\par [] Return to office: 4 months

## 2023-04-21 NOTE — PHYSICAL EXAM
[Normal Conjunctiva] : normal conjunctiva [Normal] : moves all extremities, no focal deficits, normal speech [Appears Anxious] : appears anxious [de-identified] : middle aged woman, anxious  [de-identified] : supple [de-identified] : JVP ~ 7 cm H20, RRR, s1, s2, no murmurs [de-identified] : unlabored respirations, clear lung fields [de-identified] : non-distended [de-identified] : no lower extremity edema

## 2023-04-27 ENCOUNTER — APPOINTMENT (OUTPATIENT)
Dept: PSYCHIATRY | Facility: CLINIC | Age: 58
End: 2023-04-27
Payer: COMMERCIAL

## 2023-04-27 PROCEDURE — 99214 OFFICE O/P EST MOD 30 MIN: CPT

## 2023-04-27 NOTE — HISTORY OF PRESENT ILLNESS
[FreeTextEntry1] : Pt reports poor energy , she states she thinks the Cymbalta is contributing to fatigue. \par Pt c/o neck pain, and pt is concerned that pain she had in a portion of her body had returned, she is concerned that adjusting her Cymbalta will\par be more problematic, when she missed some doses her mood was poor and she felt more pain and felt more negative. ( three days last week). \par She recognized her mood was poor and she used alcohol- ( may have also stopped because she wanted to imbibe at the party).

## 2023-04-27 NOTE — DISCUSSION/SUMMARY
[Potential impact of patient’s physical health conditions on psychiatric care?] : Potential impact of patient’s physical health conditions on psychiatric care: Yes [Does patient require any additional health services or referrals?] : Does patient require any additional health services or referrals: No

## 2023-04-27 NOTE — PHYSICAL EXAM
[Well groomed] : well groomed [Cooperative] : cooperative [Euthymic] : euthymic [Full] : full [Clear] : clear [Linear/Goal Directed] : linear/goal directed [None] : none [None Reported] : none reported [Average] : average [WNL] : within normal limits [de-identified] : Pt walks and hopes to swim, and she tried alfred chi, as well as stretch , yoga, and calisthenics. \par Pt feels limited in her results.

## 2023-04-27 NOTE — PLAN
[Yes. details: ___] : Yes, [unfilled] [Medication education provided] : Medication education provided. [Rationale for medication choices, possible risks/precautions, benefits, alternative treatment choices, and consequences of non-treatment discussed] : Rationale for medication choices, possible risks/precautions, benefits, alternative treatment choices, and consequences of non-treatment discussed with patient/family/caregiver  [FreeTextEntry5] : continue current medication. Not a candidate for a psychostimulant.  [FreeTextEntry4] : Pt working towards treatment goals. \par Will encourage consistency with medication times, switching to later time. \par

## 2023-05-18 ENCOUNTER — APPOINTMENT (OUTPATIENT)
Dept: ELECTROPHYSIOLOGY | Facility: CLINIC | Age: 58
End: 2023-05-18
Payer: COMMERCIAL

## 2023-05-18 ENCOUNTER — NON-APPOINTMENT (OUTPATIENT)
Age: 58
End: 2023-05-18

## 2023-05-18 PROCEDURE — 93298 REM INTERROG DEV EVAL SCRMS: CPT

## 2023-05-18 PROCEDURE — G2066: CPT

## 2023-05-24 ENCOUNTER — LABORATORY RESULT (OUTPATIENT)
Age: 58
End: 2023-05-24

## 2023-05-25 LAB
ALBUMIN SERPL ELPH-MCNC: 4.5 G/DL
ALP BLD-CCNC: 121 U/L
ALT SERPL-CCNC: 10 U/L
ANION GAP SERPL CALC-SCNC: 13 MMOL/L
AST SERPL-CCNC: 14 U/L
BILIRUB SERPL-MCNC: 0.3 MG/DL
BUN SERPL-MCNC: 18 MG/DL
CALCIUM SERPL-MCNC: 10.3 MG/DL
CHLORIDE SERPL-SCNC: 103 MMOL/L
CO2 SERPL-SCNC: 26 MMOL/L
CREAT SERPL-MCNC: 1.15 MG/DL
CRP SERPL-MCNC: 4 MG/L
EGFR: 56 ML/MIN/1.73M2
ERYTHROCYTE [SEDIMENTATION RATE] IN BLOOD BY WESTERGREN METHOD: 23 MM/HR
FOLATE SERPL-MCNC: 4.8 NG/ML
GLUCOSE SERPL-MCNC: 108 MG/DL
POTASSIUM SERPL-SCNC: 4.8 MMOL/L
PROT SERPL-MCNC: 7.2 G/DL
SODIUM SERPL-SCNC: 141 MMOL/L
TSH SERPL-ACNC: 0.26 UIU/ML
VIT B12 SERPL-MCNC: 254 PG/ML

## 2023-06-20 ENCOUNTER — APPOINTMENT (OUTPATIENT)
Dept: ENDOCRINOLOGY | Facility: CLINIC | Age: 58
End: 2023-06-20
Payer: COMMERCIAL

## 2023-06-20 VITALS
OXYGEN SATURATION: 98 % | HEIGHT: 64 IN | SYSTOLIC BLOOD PRESSURE: 112 MMHG | BODY MASS INDEX: 28.38 KG/M2 | DIASTOLIC BLOOD PRESSURE: 66 MMHG | HEART RATE: 94 BPM | TEMPERATURE: 97.1 F | WEIGHT: 166.25 LBS

## 2023-06-20 PROCEDURE — 99215 OFFICE O/P EST HI 40 MIN: CPT

## 2023-06-20 NOTE — HISTORY OF PRESENT ILLNESS
[FreeTextEntry1] : 57 year old female w/ history of fibromyalgia, depression with history of thyroid cancer here for follow up \par \par In the interim, \par Had Covid in October \par Poison Ivy - ongoing for the past 2 weeks \par \par \par 2006 - History of thyroidectomy ( Pathology is unclear) performed at [facility] with Dr. Claros \par 2007: MADRID administered \par \par She has not had an ultrasound of the neck in 10 years \par WBS was performed post therapy in 2012 and was negative for any uptake \par \par 01/2021: Tg was <0.2 \par \par Currently on Levothyroxine 125 mcg daily \par \par Symptoms: \par -Frequent HP \par -No tremors \par -Some weight loss 4 lbs \par -Increased hair loss \par -Currently off the linzess and she has been having some chronic constipation \par \par She is currently on levothyroxine 125 mcg daily \par \par \par On NADH Aand Coenzyme q 10 \par

## 2023-06-20 NOTE — ASSESSMENT
[FreeTextEntry1] : 57 year old female with history of thyroid cancer  s/p thyroidectomy in 2006 and MADRID in 2007. \par Tg in 02/2021 was <0.2 \par \par Thyroid Cancer: \par -------------------\par -Will check Tg, TSH, Ft4 at this time \par -Tg has remained suppressed \par -US is YANIQUE \par \par \par Hypothyroidism \par -------------------\par -Continue Levothyroxine 125 mcg daily \par -TSH goal of 0.5-2.0\par \par \par -Follow up in 6 months\par

## 2023-06-20 NOTE — PHYSICAL EXAM
[Alert] : alert [Well Nourished] : well nourished [No Acute Distress] : no acute distress [Normal Sclera/Conjunctiva] : normal sclera/conjunctiva [EOMI] : extra ocular movement intact [PERRL] : pupils equal, round and reactive to light [Normal Outer Ear/Nose] : the ears and nose were normal in appearance [Normal Hearing] : hearing was normal [Normal TMs] : both tympanic membranes were normal [No Neck Mass] : no neck mass was observed [Thyroid Not Enlarged] : the thyroid was not enlarged [No Respiratory Distress] : no respiratory distress [Clear to Auscultation] : lungs were clear to auscultation bilaterally [Normal S1, S2] : normal S1 and S2 [Normal Rate] : heart rate was normal [Regular Rhythm] : with a regular rhythm [Normal Bowel Sounds] : normal bowel sounds [Not Tender] : non-tender [Soft] : abdomen soft [Normal Gait] : normal gait [No Clubbing, Cyanosis] : no clubbing  or cyanosis of the fingernails [No Joint Swelling] : no joint swelling seen [No Rash] : no rash [No Skin Lesions] : no skin lesions [No Motor Deficits] : the motor exam was normal [Normal Reflexes] : deep tendon reflexes were 2+ and symmetric [Oriented x3] : oriented to person, place, and time [Normal Affect] : the affect was normal [Normal Insight/Judgement] : insight and judgment were intact

## 2023-06-22 ENCOUNTER — APPOINTMENT (OUTPATIENT)
Dept: PSYCHIATRY | Facility: CLINIC | Age: 58
End: 2023-06-22
Payer: COMMERCIAL

## 2023-06-22 ENCOUNTER — NON-APPOINTMENT (OUTPATIENT)
Age: 58
End: 2023-06-22

## 2023-06-22 ENCOUNTER — APPOINTMENT (OUTPATIENT)
Dept: ELECTROPHYSIOLOGY | Facility: CLINIC | Age: 58
End: 2023-06-22
Payer: COMMERCIAL

## 2023-06-22 PROCEDURE — 99214 OFFICE O/P EST MOD 30 MIN: CPT

## 2023-06-22 PROCEDURE — G2066: CPT

## 2023-06-22 PROCEDURE — 93298 REM INTERROG DEV EVAL SCRMS: CPT

## 2023-06-22 NOTE — SOCIAL HISTORY
[FreeTextEntry1] : Pt reports that she is one of 13 children, had a disinterested or disengaged mother, as well as having a sister who has alcoholism and threatens suicide often. \par
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
## 2023-06-22 NOTE — PLAN
[No] : No [Medication education provided] : Medication education provided. [Rationale for medication choices, possible risks/precautions, benefits, alternative treatment choices, and consequences of non-treatment discussed] : Rationale for medication choices, possible risks/precautions, benefits, alternative treatment choices, and consequences of non-treatment discussed with patient/family/caregiver  [FreeTextEntry5] : n

## 2023-06-27 ENCOUNTER — APPOINTMENT (OUTPATIENT)
Dept: RHEUMATOLOGY | Facility: CLINIC | Age: 58
End: 2023-06-27
Payer: COMMERCIAL

## 2023-06-27 VITALS
HEART RATE: 92 BPM | HEIGHT: 64 IN | SYSTOLIC BLOOD PRESSURE: 99 MMHG | OXYGEN SATURATION: 97 % | BODY MASS INDEX: 28.17 KG/M2 | DIASTOLIC BLOOD PRESSURE: 78 MMHG | WEIGHT: 165 LBS | TEMPERATURE: 96.9 F

## 2023-06-27 DIAGNOSIS — M54.12 RADICULOPATHY, CERVICAL REGION: ICD-10-CM

## 2023-06-27 DIAGNOSIS — M54.6 PAIN IN THORACIC SPINE: ICD-10-CM

## 2023-06-27 DIAGNOSIS — M54.50 LOW BACK PAIN, UNSPECIFIED: ICD-10-CM

## 2023-06-27 PROCEDURE — 99214 OFFICE O/P EST MOD 30 MIN: CPT

## 2023-06-27 NOTE — ASSESSMENT
[FreeTextEntry1] : FAISAL MOFFETT is a 57 year old woman with prior dx of FMS which I agree with given sx as above and exam with diffuse soft tissue tender points, skin hypersensitivity, appearing fatigued. Pt also with mild dry eyes and dry mouth chronically, slightly imbalanced gait and with hx of issues with balance and falls. Remains with paucity of inflammatory arthritis or CTD sx. Responsive to low dose prednisone for markedly worse FMS sx but other medical issues making it not an ideal long term med. Did not tolerate trial of Savella. Nerve blocks are helping partially, also now established with psych who is helping to guide medication. \par \par # FMS \par - c/w duloxetine 60mg \par - c/w lyrica - she is using it variably between 1-3x /day which she feels is working well  \par - c/w prn use of muscle relaxers in between massage appts \par - c/w low dose prednisone PRN sparingly for severe flares - reports she hasn't had to use\par - I remain on board with any changes/input from psychiatry as I suspect this is a concomitant problem. \par - c/w stretching, light aerobic exercises, massage, heat as adjuncts for FMS pain. \par - c/w nonpharmacologic FMS therapies as well - encouraged to incorporate small activities that she finds beneficial during her day, optimize stretching and light exercise, and be mindful of mood, be aware not to overextend on days she has less pain, ensure adequate rest between strenuous activities. 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

## 2023-06-27 NOTE — PHYSICAL EXAM
[General Appearance - Alert] : alert [General Appearance - In No Acute Distress] : in no acute distress [General Appearance - Well Nourished] : well nourished [Sclera] : the sclera and conjunctiva were normal [PERRL With Normal Accommodation] : pupils were equal in size, round, and reactive to light [Extraocular Movements] : extraocular movements were intact [Outer Ear] : the ears and nose were normal in appearance [Neck Appearance] : the appearance of the neck was normal [Auscultation Breath Sounds / Voice Sounds] : lungs were clear to auscultation bilaterally [Heart Rate And Rhythm] : heart rate was normal and rhythm regular [Heart Sounds] : normal S1 and S2 [Edema] : there was no peripheral edema [No CVA Tenderness] : no ~M costovertebral angle tenderness [No Spinal Tenderness] : no spinal tenderness [Nail Clubbing] : no clubbing  or cyanosis of the fingernails [Musculoskeletal - Swelling] : no joint swelling seen [Motor Tone] : muscle strength and tone were normal [] : no rash [Motor Exam] : the motor exam was normal [No Focal Deficits] : no focal deficits [Oriented To Time, Place, And Person] : oriented to person, place, and time [FreeTextEntry1] : Strength intact but gait slightly off balance and tentative

## 2023-06-27 NOTE — REVIEW OF SYSTEMS
[Feeling Poorly] : feeling poorly [Feeling Tired] : feeling tired [Dry Eyes] : dryness of the eyes [Arthralgias] : arthralgias [Joint Pain] : joint pain [Sleep Disturbances] : sleep disturbances [Anxiety] : anxiety [Depression] : depression [As Noted in HPI] : as noted in HPI [Hot Flashes] : hot flashes [Negative] : Heme/Lymph [Joint Swelling] : no joint swelling [Joint Stiffness] : no joint stiffness [Suicidal] : not suicidal [FreeTextEntry3] : + tension headaches
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
## 2023-07-27 ENCOUNTER — APPOINTMENT (OUTPATIENT)
Dept: ELECTROPHYSIOLOGY | Facility: CLINIC | Age: 58
End: 2023-07-27
Payer: COMMERCIAL

## 2023-07-27 ENCOUNTER — NON-APPOINTMENT (OUTPATIENT)
Age: 58
End: 2023-07-27

## 2023-07-27 PROCEDURE — 93298 REM INTERROG DEV EVAL SCRMS: CPT

## 2023-07-27 PROCEDURE — G2066: CPT

## 2023-08-07 ENCOUNTER — APPOINTMENT (OUTPATIENT)
Dept: MRI IMAGING | Facility: HOSPITAL | Age: 58
End: 2023-08-07

## 2023-08-07 ENCOUNTER — RESULT REVIEW (OUTPATIENT)
Age: 58
End: 2023-08-07

## 2023-08-07 ENCOUNTER — OUTPATIENT (OUTPATIENT)
Dept: OUTPATIENT SERVICES | Facility: HOSPITAL | Age: 58
LOS: 1 days | End: 2023-08-07
Payer: COMMERCIAL

## 2023-08-07 DIAGNOSIS — M54.6 PAIN IN THORACIC SPINE: ICD-10-CM

## 2023-08-07 DIAGNOSIS — Z98.890 OTHER SPECIFIED POSTPROCEDURAL STATES: Chronic | ICD-10-CM

## 2023-08-07 DIAGNOSIS — E89.0 POSTPROCEDURAL HYPOTHYROIDISM: Chronic | ICD-10-CM

## 2023-08-07 DIAGNOSIS — M54.50 LOW BACK PAIN, UNSPECIFIED: ICD-10-CM

## 2023-08-07 PROCEDURE — 72146 MRI CHEST SPINE W/O DYE: CPT | Mod: 26

## 2023-08-07 PROCEDURE — 72148 MRI LUMBAR SPINE W/O DYE: CPT

## 2023-08-07 PROCEDURE — 72146 MRI CHEST SPINE W/O DYE: CPT

## 2023-08-07 PROCEDURE — 72148 MRI LUMBAR SPINE W/O DYE: CPT | Mod: 26

## 2023-08-14 ENCOUNTER — TRANSCRIPTION ENCOUNTER (OUTPATIENT)
Age: 58
End: 2023-08-14

## 2023-08-14 RX ORDER — PREGABALIN 50 MG/1
50 CAPSULE ORAL
Qty: 90 | Refills: 2 | Status: ACTIVE | COMMUNITY
Start: 2020-08-24 | End: 1900-01-01

## 2023-08-24 ENCOUNTER — APPOINTMENT (OUTPATIENT)
Dept: PSYCHIATRY | Facility: CLINIC | Age: 58
End: 2023-08-24
Payer: COMMERCIAL

## 2023-08-24 PROCEDURE — 99213 OFFICE O/P EST LOW 20 MIN: CPT

## 2023-08-24 NOTE — PLAN
[Yes. details: ___] : Yes, [unfilled] [Medication education provided] : Medication education provided. [Rationale for medication choices, possible risks/precautions, benefits, alternative treatment choices, and consequences of non-treatment discussed] : Rationale for medication choices, possible risks/precautions, benefits, alternative treatment choices, and consequences of non-treatment discussed with patient/family/caregiver  [FreeTextEntry4] : Meeting treatment goals.  [FreeTextEntry5] : Pt wants to enjoy the summer months, states she is not taking muscle relaxants as often as they impair driving ability.  Pt wanted to monitor response to lower dose of Duloxetine due to potential s/e of hypersomnolence.

## 2023-08-24 NOTE — HISTORY OF PRESENT ILLNESS
[FreeTextEntry1] : Her psychosocial stress has been having financial strain.  Pt would want her pain acknowledged. Today visibly uncomfortable, not sitting still in the chair. Pt reports she has had chest pain in the past few weeks. Discussed the potential for chest pain to be panic sx, she states this is different from both fibromyalgia or anxiety sx, it seems to radiate to jaw and onset as well as recovery is very quick. Pt reports getting jelly fish stings, and states she went to the chiropractor which was useful. Pt states she took Hydroxyzine, which may have been helpful for the toxins.  Pt states she is doing ok, in that she is appreciated by both her spouse and more recently her mother, " I have really good things in my life".  Rest of psychiatric ROS is unremarkable, pt is not anhedonic, wants to be able to enjoy more, attributes fatigue to her psych meds, will decrease.

## 2023-08-24 NOTE — PHYSICAL EXAM
[Cooperative] : cooperative [Anxious] : anxious [Full] : full [Clear] : clear [Linear/Goal Directed] : linear/goal directed [None] : none [None Reported] : none reported [Average] : average [WNL] : within normal limits [FreeTextEntry1] : casually dressed, restless in seat.

## 2023-08-28 ENCOUNTER — NON-APPOINTMENT (OUTPATIENT)
Age: 58
End: 2023-08-28

## 2023-08-28 ENCOUNTER — APPOINTMENT (OUTPATIENT)
Dept: ELECTROPHYSIOLOGY | Facility: CLINIC | Age: 58
End: 2023-08-28
Payer: COMMERCIAL

## 2023-08-29 PROCEDURE — 93298 REM INTERROG DEV EVAL SCRMS: CPT

## 2023-08-29 PROCEDURE — G2066: CPT

## 2023-09-05 ENCOUNTER — RX RENEWAL (OUTPATIENT)
Age: 58
End: 2023-09-05

## 2023-09-06 ENCOUNTER — APPOINTMENT (OUTPATIENT)
Dept: SURGERY | Facility: CLINIC | Age: 58
End: 2023-09-06
Payer: COMMERCIAL

## 2023-09-06 VITALS
SYSTOLIC BLOOD PRESSURE: 117 MMHG | OXYGEN SATURATION: 98 % | HEART RATE: 89 BPM | HEIGHT: 65 IN | RESPIRATION RATE: 17 BRPM | BODY MASS INDEX: 27.49 KG/M2 | TEMPERATURE: 97.3 F | DIASTOLIC BLOOD PRESSURE: 80 MMHG | WEIGHT: 165 LBS

## 2023-09-06 DIAGNOSIS — M62.89 OTHER SPECIFIED DISORDERS OF MUSCLE: ICD-10-CM

## 2023-09-06 DIAGNOSIS — R19.8 OTHER SPECIFIED SYMPTOMS AND SIGNS INVOLVING THE DIGESTIVE SYSTEM AND ABDOMEN: ICD-10-CM

## 2023-09-06 DIAGNOSIS — K59.09 OTHER CONSTIPATION: ICD-10-CM

## 2023-09-06 PROCEDURE — 99204 OFFICE O/P NEW MOD 45 MIN: CPT | Mod: 25

## 2023-09-06 PROCEDURE — 45300 PROCTOSIGMOIDOSCOPY DX: CPT

## 2023-09-06 RX ORDER — LINACLOTIDE 145 UG/1
145 CAPSULE, GELATIN COATED ORAL
Qty: 90 | Refills: 2 | Status: DISCONTINUED | COMMUNITY
End: 2023-09-06

## 2023-09-06 RX ORDER — LORATADINE 5 MG
TABLET,CHEWABLE ORAL
Refills: 0 | Status: ACTIVE | COMMUNITY

## 2023-09-06 RX ORDER — OMEPRAZOLE 40 MG/1
40 CAPSULE, DELAYED RELEASE ORAL DAILY
Qty: 90 | Refills: 2 | Status: DISCONTINUED | COMMUNITY
End: 2023-09-06

## 2023-09-06 RX ORDER — CLOBETASOL PROPIONATE 0.5 MG/G
0.05 CREAM TOPICAL TWICE DAILY
Qty: 1 | Refills: 1 | Status: DISCONTINUED | COMMUNITY
Start: 2022-06-16 | End: 2023-09-06

## 2023-09-06 RX ORDER — LEVOTHYROXINE SODIUM 0.12 MG/1
125 TABLET ORAL DAILY
Qty: 90 | Refills: 2 | Status: DISCONTINUED | COMMUNITY
Start: 2021-12-01 | End: 2023-09-06

## 2023-09-06 NOTE — PHYSICAL EXAM
[Normal Breath Sounds] : Normal breath sounds [Normal Heart Sounds] : normal heart sounds [Alert] : alert [Oriented to Person] : oriented to person [Oriented to Place] : oriented to place [Oriented to Time] : oriented to time [Calm] : calm [de-identified] : WNL [de-identified] : WNL [de-identified] : CHUNGL [de-identified] : WNL ROM [de-identified] : WNL [FreeTextEntry1] : Perianal inspection unremarkable.  Severe levator spasm and tenderness on digital exam.  Left greater than right.  Anoscopy mild hemorrhoid enlargement.  Rigid sigmoidoscopy to 12 cm unremarkable.  Anoscopy and rigid sigmoidoscopy performed to evaluate anus and rectum.  No sedation required.

## 2023-09-06 NOTE — HISTORY OF PRESENT ILLNESS
[FreeTextEntry1] : Rosaura is a 58 y/o female here for a consultation visit, possible hemorrhoids.   Has fibromyalgia.   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 Sigmoid diverticulosis.   Today pt reports anal pain. Daily BMs with 4 doses of MiraLAX daily and weekly colonic enemas, "ribbon" like formed or loose, with pain (sharp) when unable to have a BM (feels something is "blocking the way") - pain will last until pt has a BM, no bleeding, no episodes of incontinence, and does feel swollen tissue after having enema today (but didn't have it before). Taking baby aspirin, hx of stroke.

## 2023-09-06 NOTE — ASSESSMENT
[FreeTextEntry1] : I have seen and evaluated patient and I have corroborated all nursing input into this note.  Patient with longstanding pelvic pain and difficult evacuation.  She can only evacuate liquid stool when she takes laxatives or when she uses enemas.  She reports a negative fit test last year.  The patient's last colonoscopy was in 2016.  I recommended a follow-up exam or sigmoidoscopy in conjunction with yearly stool testing.  The patient stated she would think about it and let my office know.  She has significant pelvic floor spasm which results in pain and difficult evacuation.  I prescribed pelvic floor physical therapy with biofeedback.  Patient will follow-up as needed. no

## 2023-09-24 ENCOUNTER — NON-APPOINTMENT (OUTPATIENT)
Age: 58
End: 2023-09-24

## 2023-09-25 ENCOUNTER — RX RENEWAL (OUTPATIENT)
Age: 58
End: 2023-09-25

## 2023-09-26 ENCOUNTER — APPOINTMENT (OUTPATIENT)
Dept: RHEUMATOLOGY | Facility: CLINIC | Age: 58
End: 2023-09-26
Payer: COMMERCIAL

## 2023-09-26 VITALS
HEIGHT: 65 IN | RESPIRATION RATE: 16 BRPM | DIASTOLIC BLOOD PRESSURE: 72 MMHG | TEMPERATURE: 98.1 F | WEIGHT: 164 LBS | SYSTOLIC BLOOD PRESSURE: 101 MMHG | HEART RATE: 91 BPM | BODY MASS INDEX: 27.32 KG/M2 | OXYGEN SATURATION: 98 %

## 2023-09-26 DIAGNOSIS — R05.3 CHRONIC COUGH: ICD-10-CM

## 2023-09-26 DIAGNOSIS — R29.6 REPEATED FALLS: ICD-10-CM

## 2023-09-26 DIAGNOSIS — M25.50 PAIN IN UNSPECIFIED JOINT: ICD-10-CM

## 2023-09-26 PROCEDURE — 99214 OFFICE O/P EST MOD 30 MIN: CPT

## 2023-09-27 PROBLEM — M25.50 DIFFUSE ARTHRALGIA: Status: ACTIVE | Noted: 2021-07-29

## 2023-10-01 PROBLEM — I63.81 LACUNAR INFARCTION: Status: ACTIVE | Noted: 2021-08-19

## 2023-10-02 ENCOUNTER — APPOINTMENT (OUTPATIENT)
Dept: ELECTROPHYSIOLOGY | Facility: CLINIC | Age: 58
End: 2023-10-02
Payer: COMMERCIAL

## 2023-10-02 ENCOUNTER — NON-APPOINTMENT (OUTPATIENT)
Age: 58
End: 2023-10-02

## 2023-10-03 ENCOUNTER — LABORATORY RESULT (OUTPATIENT)
Age: 58
End: 2023-10-03

## 2023-10-03 LAB — VIT B12 SERPL-MCNC: 332 PG/ML

## 2023-10-03 PROCEDURE — 93298 REM INTERROG DEV EVAL SCRMS: CPT

## 2023-10-03 PROCEDURE — G2066: CPT

## 2023-10-04 LAB
T3 SERPL-MCNC: 101 NG/DL
T4 FREE SERPL-MCNC: 1.4 NG/DL
TSH SERPL-ACNC: 0.23 UIU/ML

## 2023-10-06 ENCOUNTER — APPOINTMENT (OUTPATIENT)
Dept: FAMILY MEDICINE | Facility: CLINIC | Age: 58
End: 2023-10-06
Payer: COMMERCIAL

## 2023-10-06 VITALS
RESPIRATION RATE: 16 BRPM | WEIGHT: 161 LBS | TEMPERATURE: 99.4 F | OXYGEN SATURATION: 95 % | BODY MASS INDEX: 26.82 KG/M2 | DIASTOLIC BLOOD PRESSURE: 80 MMHG | HEART RATE: 94 BPM | HEIGHT: 65 IN | SYSTOLIC BLOOD PRESSURE: 112 MMHG

## 2023-10-06 PROCEDURE — 99214 OFFICE O/P EST MOD 30 MIN: CPT | Mod: 25

## 2023-10-06 PROCEDURE — 96372 THER/PROPH/DIAG INJ SC/IM: CPT

## 2023-10-06 RX ORDER — CYANOCOBALAMIN 1000 UG/ML
1000 INJECTION INTRAMUSCULAR; SUBCUTANEOUS
Qty: 0 | Refills: 0 | Status: COMPLETED | OUTPATIENT
Start: 2023-10-06

## 2023-10-06 RX ADMIN — CYANOCOBALAMIN 1 MCG/ML: 1000 INJECTION INTRAMUSCULAR; SUBCUTANEOUS at 00:00

## 2023-10-12 ENCOUNTER — APPOINTMENT (OUTPATIENT)
Dept: PSYCHIATRY | Facility: CLINIC | Age: 58
End: 2023-10-12
Payer: COMMERCIAL

## 2023-10-12 PROCEDURE — 99214 OFFICE O/P EST MOD 30 MIN: CPT

## 2023-10-12 RX ORDER — DULOXETINE HYDROCHLORIDE 60 MG/1
60 CAPSULE, DELAYED RELEASE PELLETS ORAL
Qty: 30 | Refills: 1 | Status: DISCONTINUED | COMMUNITY
Start: 2023-02-06 | End: 2023-10-12

## 2023-10-13 ENCOUNTER — APPOINTMENT (OUTPATIENT)
Dept: FAMILY MEDICINE | Facility: CLINIC | Age: 58
End: 2023-10-13

## 2023-10-17 RX ORDER — MECOBALAMIN 1000 MCG
1000 TABLET,DISINTEGRATING SUBLINGUAL DAILY
Qty: 30 | Refills: 11 | Status: ACTIVE | COMMUNITY
Start: 2023-10-17 | End: 1900-01-01

## 2023-10-20 ENCOUNTER — APPOINTMENT (OUTPATIENT)
Dept: FAMILY MEDICINE | Facility: CLINIC | Age: 58
End: 2023-10-20
Payer: COMMERCIAL

## 2023-10-20 PROCEDURE — 96372 THER/PROPH/DIAG INJ SC/IM: CPT

## 2023-10-20 RX ORDER — CYANOCOBALAMIN 1000 UG/ML
1000 INJECTION INTRAMUSCULAR; SUBCUTANEOUS
Qty: 0 | Refills: 0 | Status: COMPLETED | OUTPATIENT
Start: 2023-10-20

## 2023-10-20 RX ORDER — CYANOCOBALAMIN 1000 UG/ML
1000 INJECTION INTRAMUSCULAR; SUBCUTANEOUS
Qty: 1 | Refills: 3 | Status: ACTIVE | COMMUNITY
Start: 2023-10-20 | End: 1900-01-01

## 2023-10-20 RX ADMIN — CYANOCOBALAMIN 0 MCG/ML: 1000 INJECTION INTRAMUSCULAR; SUBCUTANEOUS at 00:00

## 2023-10-25 ENCOUNTER — APPOINTMENT (OUTPATIENT)
Dept: FAMILY MEDICINE | Facility: CLINIC | Age: 58
End: 2023-10-25
Payer: COMMERCIAL

## 2023-10-25 PROCEDURE — 96372 THER/PROPH/DIAG INJ SC/IM: CPT

## 2023-10-25 RX ORDER — CYANOCOBALAMIN 1000 UG/ML
1000 INJECTION INTRAMUSCULAR; SUBCUTANEOUS
Qty: 0 | Refills: 0 | Status: COMPLETED | OUTPATIENT
Start: 2023-10-25

## 2023-10-25 RX ORDER — CYANOCOBALAMIN 1000 UG/ML
1000 INJECTION INTRAMUSCULAR; SUBCUTANEOUS
Qty: 1 | Refills: 0 | Status: ACTIVE | COMMUNITY
Start: 2023-10-25 | End: 1900-01-01

## 2023-10-25 RX ADMIN — CYANOCOBALAMIN 0 MCG/ML: 1000 INJECTION, SOLUTION INTRAMUSCULAR; SUBCUTANEOUS at 00:00

## 2023-10-26 ENCOUNTER — RX RENEWAL (OUTPATIENT)
Age: 58
End: 2023-10-26

## 2023-11-03 ENCOUNTER — APPOINTMENT (OUTPATIENT)
Dept: ELECTROPHYSIOLOGY | Facility: CLINIC | Age: 58
End: 2023-11-03
Payer: COMMERCIAL

## 2023-11-03 ENCOUNTER — APPOINTMENT (OUTPATIENT)
Dept: FAMILY MEDICINE | Facility: CLINIC | Age: 58
End: 2023-11-03
Payer: COMMERCIAL

## 2023-11-03 ENCOUNTER — NON-APPOINTMENT (OUTPATIENT)
Age: 58
End: 2023-11-03

## 2023-11-03 PROCEDURE — 96372 THER/PROPH/DIAG INJ SC/IM: CPT

## 2023-11-03 RX ORDER — CYANOCOBALAMIN 1000 UG/ML
1000 INJECTION INTRAMUSCULAR; SUBCUTANEOUS
Qty: 0 | Refills: 0 | Status: COMPLETED | OUTPATIENT
Start: 2023-11-03

## 2023-11-04 PROCEDURE — G2066: CPT

## 2023-11-04 PROCEDURE — 93298 REM INTERROG DEV EVAL SCRMS: CPT

## 2023-11-10 ENCOUNTER — APPOINTMENT (OUTPATIENT)
Dept: FAMILY MEDICINE | Facility: CLINIC | Age: 58
End: 2023-11-10

## 2023-11-17 ENCOUNTER — APPOINTMENT (OUTPATIENT)
Dept: RADIOLOGY | Facility: HOSPITAL | Age: 58
End: 2023-11-17

## 2023-11-17 DIAGNOSIS — Z13.820 ENCOUNTER FOR SCREENING FOR OSTEOPOROSIS: ICD-10-CM

## 2023-12-01 DIAGNOSIS — E55.9 VITAMIN D DEFICIENCY, UNSPECIFIED: ICD-10-CM

## 2023-12-06 ENCOUNTER — APPOINTMENT (OUTPATIENT)
Dept: ELECTROPHYSIOLOGY | Facility: CLINIC | Age: 58
End: 2023-12-06
Payer: COMMERCIAL

## 2023-12-06 ENCOUNTER — NON-APPOINTMENT (OUTPATIENT)
Age: 58
End: 2023-12-06

## 2023-12-07 ENCOUNTER — APPOINTMENT (OUTPATIENT)
Dept: PSYCHIATRY | Facility: CLINIC | Age: 58
End: 2023-12-07
Payer: COMMERCIAL

## 2023-12-07 PROCEDURE — G2066: CPT

## 2023-12-07 PROCEDURE — 99214 OFFICE O/P EST MOD 30 MIN: CPT

## 2023-12-07 PROCEDURE — 93298 REM INTERROG DEV EVAL SCRMS: CPT

## 2023-12-08 ENCOUNTER — APPOINTMENT (OUTPATIENT)
Dept: FAMILY MEDICINE | Facility: CLINIC | Age: 58
End: 2023-12-08
Payer: COMMERCIAL

## 2023-12-08 VITALS
TEMPERATURE: 98.7 F | DIASTOLIC BLOOD PRESSURE: 79 MMHG | BODY MASS INDEX: 26.46 KG/M2 | RESPIRATION RATE: 16 BRPM | OXYGEN SATURATION: 98 % | WEIGHT: 159 LBS | SYSTOLIC BLOOD PRESSURE: 113 MMHG | HEART RATE: 66 BPM

## 2023-12-08 PROCEDURE — 99214 OFFICE O/P EST MOD 30 MIN: CPT

## 2023-12-11 ENCOUNTER — TRANSCRIPTION ENCOUNTER (OUTPATIENT)
Age: 58
End: 2023-12-11

## 2023-12-11 LAB
25(OH)D3 SERPL-MCNC: 42 NG/ML
ALBUMIN SERPL ELPH-MCNC: 4.4 G/DL
ALP BLD-CCNC: 102 U/L
ALT SERPL-CCNC: 8 U/L
ANION GAP SERPL CALC-SCNC: 12 MMOL/L
AST SERPL-CCNC: 11 U/L
BASOPHILS # BLD AUTO: 0.03 K/UL
BASOPHILS NFR BLD AUTO: 0.5 %
BILIRUB SERPL-MCNC: 0.4 MG/DL
BUN SERPL-MCNC: 21 MG/DL
CALCIUM SERPL-MCNC: 10.2 MG/DL
CHLORIDE SERPL-SCNC: 103 MMOL/L
CHOLEST SERPL-MCNC: 202 MG/DL
CO2 SERPL-SCNC: 26 MMOL/L
CREAT SERPL-MCNC: 0.94 MG/DL
EGFR: 71 ML/MIN/1.73M2
EOSINOPHIL # BLD AUTO: 0.07 K/UL
EOSINOPHIL NFR BLD AUTO: 1.1 %
ESTIMATED AVERAGE GLUCOSE: 114 MG/DL
GLUCOSE SERPL-MCNC: 103 MG/DL
HBA1C MFR BLD HPLC: 5.6 %
HCT VFR BLD CALC: 42.8 %
HDLC SERPL-MCNC: 62 MG/DL
HGB BLD-MCNC: 13.7 G/DL
IMM GRANULOCYTES NFR BLD AUTO: 0.2 %
LDLC SERPL CALC-MCNC: 122 MG/DL
LYMPHOCYTES # BLD AUTO: 2.22 K/UL
LYMPHOCYTES NFR BLD AUTO: 34.9 %
MAN DIFF?: NORMAL
MCHC RBC-ENTMCNC: 29.8 PG
MCHC RBC-ENTMCNC: 32 GM/DL
MCV RBC AUTO: 93 FL
MONOCYTES # BLD AUTO: 0.57 K/UL
MONOCYTES NFR BLD AUTO: 8.9 %
NEUTROPHILS # BLD AUTO: 3.47 K/UL
NEUTROPHILS NFR BLD AUTO: 54.4 %
NONHDLC SERPL-MCNC: 140 MG/DL
PLATELET # BLD AUTO: 270 K/UL
POTASSIUM SERPL-SCNC: 4.6 MMOL/L
PROT SERPL-MCNC: 6.8 G/DL
RBC # BLD: 4.6 M/UL
RBC # FLD: 14.3 %
SODIUM SERPL-SCNC: 141 MMOL/L
TRIGL SERPL-MCNC: 98 MG/DL
TSH SERPL-ACNC: 0.32 UIU/ML
VIT B12 SERPL-MCNC: 508 PG/ML
WBC # FLD AUTO: 6.37 K/UL

## 2023-12-12 ENCOUNTER — TRANSCRIPTION ENCOUNTER (OUTPATIENT)
Age: 58
End: 2023-12-12

## 2023-12-15 ENCOUNTER — APPOINTMENT (OUTPATIENT)
Dept: CARDIOLOGY | Facility: CLINIC | Age: 58
End: 2023-12-15
Payer: COMMERCIAL

## 2023-12-15 ENCOUNTER — NON-APPOINTMENT (OUTPATIENT)
Age: 58
End: 2023-12-15

## 2023-12-15 VITALS
TEMPERATURE: 96.8 F | DIASTOLIC BLOOD PRESSURE: 78 MMHG | BODY MASS INDEX: 26.49 KG/M2 | SYSTOLIC BLOOD PRESSURE: 110 MMHG | HEIGHT: 65 IN | OXYGEN SATURATION: 98 % | RESPIRATION RATE: 18 BRPM | HEART RATE: 75 BPM | WEIGHT: 159 LBS

## 2023-12-15 PROCEDURE — 93000 ELECTROCARDIOGRAM COMPLETE: CPT

## 2023-12-15 PROCEDURE — 99214 OFFICE O/P EST MOD 30 MIN: CPT | Mod: 25

## 2023-12-17 NOTE — CARDIOLOGY SUMMARY
[de-identified] : ECG (9/8/22): sinus bradycardia, nonspecific ST abnormalities (similar to prior ECG) ECG (9/16/22): sinus bradycardia, nonspecific ST abnormalities  ECG (1/19/23): normal sinus rhythm, nonspecific ST abnormalities, artifact ECG (4/20/23): normal sinus rhythm, nonspecific ST abnormalities  ECG (12/15/23): normal sinus rhythm, RSR', nonspecific ST abnormalities  [de-identified] : Nuclear Stress Test (8/2021): Medium sized defect suggestive of ischemia  [de-identified] : TTE (8/2021): LVEF 63%. Normal RV size and function. Mild MR. No pericardial effusion.  TTE (9/2022): LVEF 60-65%. Normal RV size and function. No pericardial effusion.  [de-identified] : Coronary angiogram (9/2021): Normal coronary arteries.

## 2023-12-17 NOTE — RESULTS/DATA
[TextEntry] :  12/2023:  Lipid panel: Total cholesterol 202, , HDL 62, Triglycerides 98 CBC: WBC 6, Hgb 14, Hct 43, Plt 270 CMP: Na 141, K 4.6, BUN 21, Cr 0.9, GFR 71, AST 11, ALT 8 HbA1C: 5.6  TSH: 0.3

## 2023-12-17 NOTE — PHYSICAL EXAM
[Normal Conjunctiva] : normal conjunctiva [Normal] : moves all extremities, no focal deficits, normal speech [Appears Anxious] : appears anxious [de-identified] : no acute distress [de-identified] : supple, no carotid bruits b/l [de-identified] : JVP ~ 7 cm H20, RRR, s1, s2, no murmurs [de-identified] : unlabored respirations, clear lung fields [de-identified] : non-distended [de-identified] : no lower extremity edema

## 2023-12-17 NOTE — HISTORY OF PRESENT ILLNESS
[FreeTextEntry1] : Rosaura Elmore is a 58 year old woman with past medical history of Normal coronaries (2021), Lacunar CVA (s/p ILR), Cervical stenosis, Fibromyalgia, Gastritis, Hiatal hernia,  Anxiety & Depression presents for follow up visit.  Since her last visit she reports that she has episodes of fatigue and body aches which she attributes to her Fibromyalgia. She also experiences occasional chest pain that is sporadic and not particularly worse on exertion. Denies exertional shortness of breath.

## 2023-12-17 NOTE — REVIEW OF SYSTEMS
[Chest Discomfort] : chest discomfort [Headache] : no headache [Feeling Fatigued] : feeling fatigued [Blurry Vision] : no blurred vision [SOB] : no shortness of breath [Lower Ext Edema] : no extremity edema [Palpitations] : no palpitations [Syncope] : no syncope [Cough] : no cough [Abdominal Pain] : no abdominal pain [Dizziness] : no dizziness [Rash] : no rash [Confusion] : no confusion was observed [Easy Bruising] : no tendency for easy bruising

## 2023-12-17 NOTE — ASSESSMENT
[FreeTextEntry1] : Assessment: Rosaura Elmore is a 58 year old woman with past medical history of Normal coronaries (2021), Lacunar CVA (s/p ILR), Cervical stenosis, Fibromyalgia, Gastritis, Hiatal hernia, Anxiety & Depression who presents for follow up visit.  Since her last visit she reports she has episodes of fatigue, she has also experienced sporadic chest pain, not particularly worse on exertion which has atypical features. ECG consistent with sinus rhythm and nonspecific ST abnormalities, similar to prior ECG. Prior echocardiogram (9/2022) consistent with normal LV and RV systolic function. Coronary angiogram (9/2021) consistent with normal coronary arteries. Chart review indicates recent ILR transmissions were negative for arrhythmias. Symptoms are atypical for cardiac causes and may be due to fibromyalgia and gastritis.  Recommendations: [] Chest discomfort: Chronic and atypical. Prior echocardiogram (9/2022) was unremarkable, no regional wall motion abnormalities. In addition, patient had normal coronary angiogram in 9/2021 which makes obstructive CAD unlikely at this time. No signs of arrhythmia on ILR interrogation reports (ILR to be removed 11/2024). Will continue to monitor symptoms, likely plan for repeat echocardiogram in the New Year if symptoms persist or worsen.  [] Risk factors: 10 yr ASCVD risk score 1.5% is low,  mg/dl, continue lifestyle modifications with AHA/Mediterranean diet and aerobic exercise regimen as tolerated. Recent CBC, CMP, TSH and HbA1C within normal limits [] Return to office: 4 months

## 2023-12-22 ENCOUNTER — APPOINTMENT (OUTPATIENT)
Dept: ENDOCRINOLOGY | Facility: CLINIC | Age: 58
End: 2023-12-22
Payer: COMMERCIAL

## 2023-12-22 VITALS
OXYGEN SATURATION: 98 % | HEIGHT: 65 IN | BODY MASS INDEX: 26.49 KG/M2 | SYSTOLIC BLOOD PRESSURE: 124 MMHG | WEIGHT: 159 LBS | TEMPERATURE: 98.1 F | HEART RATE: 85 BPM | DIASTOLIC BLOOD PRESSURE: 77 MMHG

## 2023-12-22 PROCEDURE — 99215 OFFICE O/P EST HI 40 MIN: CPT

## 2023-12-22 NOTE — PHYSICAL EXAM
[Alert] : alert [Well Nourished] : well nourished [No Acute Distress] : no acute distress [Normal Sclera/Conjunctiva] : normal sclera/conjunctiva [PERRL] : pupils equal, round and reactive to light [Normal Outer Ear/Nose] : the ears and nose were normal in appearance [Normal TMs] : both tympanic membranes were normal [No Neck Mass] : no neck mass was observed [Thyroid Not Enlarged] : the thyroid was not enlarged [No Respiratory Distress] : no respiratory distress [Clear to Auscultation] : lungs were clear to auscultation bilaterally [Normal Rate] : heart rate was normal [Normal Bowel Sounds] : normal bowel sounds [Soft] : abdomen soft [Normal Gait] : normal gait [No Joint Swelling] : no joint swelling seen [No Rash] : no rash [Oriented x3] : oriented to person, place, and time [Normal Insight/Judgement] : insight and judgment were intact

## 2023-12-22 NOTE — HISTORY OF PRESENT ILLNESS
[FreeTextEntry1] : 57 year old female w/ history of fibromyalgia, depression with history of thyroid cancer here for follow up   In the interim,  Had Covid in October  Po[place] Ivy - ongoing for the past 2 weeks    2006 - History of thyroidectomy ( Pathology is unclear) performed at [facility] with Dr. Claros  2007: MADRID administered   She has not had an ultrasound of the neck in 10 years  WBS was performed post therapy in 2012 and was negative for any uptake   01/2021: Tg was <0.2   Currently on Levothyroxine 112 mcg daily x 5.5 tabs per week   Symptoms:  -Increased fatigue  -Frequent HP  -No tremors  -5 lbs in the last 3 months  -No hair loss  -Currently off the linzess and she has been having some chronic constipation      On NADH Aand Coenzyme q 10

## 2023-12-22 NOTE — REVIEW OF SYSTEMS
[Fatigue] : no fatigue [Decreased Appetite] : appetite not decreased [Recent Weight Gain (___ Lbs)] : no recent weight gain [Recent Weight Loss (___ Lbs)] : no recent weight loss [Visual Field Defect] : no visual field defect [Dry Eyes] : no dryness [Dysphagia] : no dysphagia [Neck Pain] : no neck pain [Dysphonia] : no dysphonia [Nasal Congestion] : no nasal congestion [Chest Pain] : no chest pain [Palpitations] : no palpitations [Shortness Of Breath] : no shortness of breath [Nausea] : no nausea [Vomiting] : no vomiting [Polyuria] : no polyuria [Irregular Menses] : regular menses [Joint Pain] : no joint pain [Muscle Weakness] : no muscle weakness [Acanthosis] : no acanthosis  [Headaches] : no headaches [Dizziness] : no dizziness [Tremors] : no tremors [Depression] : no depression [Polydipsia] : no polydipsia [Cold Intolerance] : no cold intolerance [Easy Bleeding] : no ~M tendency for easy bleeding [Easy Bruising] : no tendency for easy bruising

## 2023-12-26 ENCOUNTER — NON-APPOINTMENT (OUTPATIENT)
Age: 58
End: 2023-12-26

## 2023-12-26 ENCOUNTER — RX CHANGE (OUTPATIENT)
Age: 58
End: 2023-12-26

## 2023-12-28 NOTE — REVIEW OF SYSTEMS
oral [Fatigue] : fatigue [Joint Pain] : joint pain [Muscle Weakness] : muscle weakness [Muscle Pain] : muscle pain [Back Pain] : back pain [Negative] : Heme/Lymph

## 2024-01-10 ENCOUNTER — NON-APPOINTMENT (OUTPATIENT)
Age: 59
End: 2024-01-10

## 2024-01-10 ENCOUNTER — APPOINTMENT (OUTPATIENT)
Dept: ELECTROPHYSIOLOGY | Facility: CLINIC | Age: 59
End: 2024-01-10
Payer: COMMERCIAL

## 2024-01-11 PROCEDURE — 93298 REM INTERROG DEV EVAL SCRMS: CPT

## 2024-01-18 ENCOUNTER — APPOINTMENT (OUTPATIENT)
Dept: PSYCHIATRY | Facility: CLINIC | Age: 59
End: 2024-01-18
Payer: COMMERCIAL

## 2024-01-18 PROCEDURE — 99214 OFFICE O/P EST MOD 30 MIN: CPT

## 2024-01-18 RX ORDER — HYDROXYZINE HYDROCHLORIDE 25 MG/1
25 TABLET ORAL AT BEDTIME
Qty: 30 | Refills: 2 | Status: ACTIVE | COMMUNITY
Start: 2023-02-06

## 2024-01-18 NOTE — REVIEW OF SYSTEMS
[Negative] : Allergic/Immunologic [FreeTextEntry9] : chronic pain  [de-identified] : see interval history.

## 2024-01-18 NOTE — HISTORY OF PRESENT ILLNESS
[FreeTextEntry1] : Per pt also facing the loss of her endocrinologist, who will be going to [facility].  Per pt thyroid functions are still abnormal, and she is frustrated with disability, she states she was ill after [place] and thought it was RSV.  Pt reports she was flu negative and COVID negative. Pt will also follow up with pulmonology. Pt feels she is coping well psychiatrically and has deliberately lost 25 lbs, pt states this could be in response to lowering the dosage of her antidepressant. Pt on Duloxetine 50 mg. Pt does chores and rests intermittently. Reads, does meditation, and listens to music.  Pt states her daughter is having difficulty adjusting to her disability.

## 2024-01-18 NOTE — PHYSICAL EXAM
[Walk Is Unsteady] : walk is unsteady [Feeling Restless] : feeling ~L restless [Cooperative] : cooperative [Euthymic] : euthymic [Full] : full [Clear] : clear [Linear/Goal Directed] : linear/goal directed [None] : none [Average] : average [WNL] : within normal limits [FreeTextEntry1] : Casually dressed but is restless and sitting uncomfortably in her chair.  [de-identified] : Pt reports she functions and does complete chores in little chunks.  [de-identified] : Pt reports that she is doing well overall.  Pt feels she is improving. Notices mind body/connection.

## 2024-01-18 NOTE — PLAN
[No] : No [Medication education provided] : Medication education provided. [Rationale for medication choices, possible risks/precautions, benefits, alternative treatment choices, and consequences of non-treatment discussed] : Rationale for medication choices, possible risks/precautions, benefits, alternative treatment choices, and consequences of non-treatment discussed with patient/family/caregiver  [FreeTextEntry4] : Meeting goals regarding psychiatric concerns.  Sleep fair, pt not really using Hydroxyzine.  [FreeTextEntry5] : Pt able to vent her frustrations, and overall is using positive coping skills.  Discussed her "invisible disability", and how she is attempting to educate others and practice radical acceptance.

## 2024-01-18 NOTE — DISCUSSION/SUMMARY
[FreeTextEntry1] : Has some Kitty's phenomena noted in the office, in her hands will redden and then twan.

## 2024-01-21 ENCOUNTER — TRANSCRIPTION ENCOUNTER (OUTPATIENT)
Age: 59
End: 2024-01-21

## 2024-01-22 ENCOUNTER — APPOINTMENT (OUTPATIENT)
Dept: OBGYN | Facility: CLINIC | Age: 59
End: 2024-01-22

## 2024-01-22 ENCOUNTER — APPOINTMENT (OUTPATIENT)
Dept: OBGYN | Facility: CLINIC | Age: 59
End: 2024-01-22
Payer: COMMERCIAL

## 2024-01-22 VITALS
HEART RATE: 83 BPM | HEIGHT: 65 IN | OXYGEN SATURATION: 98 % | TEMPERATURE: 98 F | SYSTOLIC BLOOD PRESSURE: 114 MMHG | DIASTOLIC BLOOD PRESSURE: 72 MMHG

## 2024-01-22 DIAGNOSIS — R32 UNSPECIFIED URINARY INCONTINENCE: ICD-10-CM

## 2024-01-22 DIAGNOSIS — R35.0 FREQUENCY OF MICTURITION: ICD-10-CM

## 2024-01-22 DIAGNOSIS — N81.4 UTEROVAGINAL PROLAPSE, UNSPECIFIED: ICD-10-CM

## 2024-01-22 DIAGNOSIS — Z01.419 ENCOUNTER FOR GYNECOLOGICAL EXAMINATION (GENERAL) (ROUTINE) W/OUT ABNORMAL FINDINGS: ICD-10-CM

## 2024-01-22 DIAGNOSIS — Z12.4 ENCOUNTER FOR SCREENING FOR MALIGNANT NEOPLASM OF CERVIX: ICD-10-CM

## 2024-01-22 LAB
BILIRUB UR QL STRIP: NEGATIVE
CLARITY UR: CLEAR
COLLECTION METHOD: NORMAL
GLUCOSE UR-MCNC: NEGATIVE
HCG UR QL: 0.2 EU/DL
HGB UR QL STRIP.AUTO: NORMAL
KETONES UR-MCNC: NEGATIVE
LEUKOCYTE ESTERASE UR QL STRIP: NORMAL
NITRITE UR QL STRIP: NEGATIVE
PH UR STRIP: 5.5
PROT UR STRIP-MCNC: NEGATIVE
SP GR UR STRIP: >=1.03

## 2024-01-22 PROCEDURE — 99213 OFFICE O/P EST LOW 20 MIN: CPT | Mod: 25

## 2024-01-22 PROCEDURE — 99386 PREV VISIT NEW AGE 40-64: CPT

## 2024-01-22 PROCEDURE — 81003 URINALYSIS AUTO W/O SCOPE: CPT | Mod: QW

## 2024-01-23 LAB
APPEARANCE: CLEAR
BACTERIA: NEGATIVE /HPF
BILIRUBIN URINE: NEGATIVE
BLOOD URINE: ABNORMAL
CALCIUM OXALATE CRYSTALS: PRESENT
CANDIDA VAG CYTO: NOT DETECTED
CAST: NORMAL /LPF
COLOR: YELLOW
EPITHELIAL CELLS: 5 /HPF
G VAGINALIS+PREV SP MTYP VAG QL MICRO: NOT DETECTED
GLUCOSE QUALITATIVE U: NEGATIVE MG/DL
KETONES URINE: NEGATIVE MG/DL
LEUKOCYTE ESTERASE URINE: ABNORMAL
MICROSCOPIC-UA: NORMAL
MUCUS: PRESENT
NITRITE URINE: NEGATIVE
PH URINE: 5.5
PROTEIN URINE: NEGATIVE MG/DL
RED BLOOD CELLS URINE: 9 /HPF
SPECIFIC GRAVITY URINE: 1.02
T VAGINALIS VAG QL WET PREP: NOT DETECTED
UROBILINOGEN URINE: 0.2 MG/DL
WHITE BLOOD CELLS URINE: 2 /HPF

## 2024-01-23 NOTE — HISTORY OF PRESENT ILLNESS
[Patient reported mammogram was normal] : Patient reported mammogram was normal [No] : Patient does not have concerns regarding sex [Currently Active] : currently active [FreeTextEntry1] : 57 yo presents annual exam c/o of  urine and fecal incontinence [Mammogramdate] : 02/23 [ColonoscopyDate] : 5  [facility] [[facility]] : 2 [[facility] [Post-Menopause, No Sxs] : post-menopausal, currently without symptoms

## 2024-01-23 NOTE — PHYSICAL EXAM
[Chaperone Present] : A chaperone was present in the examining room during all aspects of the physical examination [Appropriately responsive] : appropriately responsive [Alert] : alert [No Acute Distress] : no acute distress [No Lymphadenopathy] : no lymphadenopathy [Soft] : soft [Non-tender] : non-tender [Examination Of The Breasts] : a normal appearance [Breast Palpation Diffuse Fibrous Tissue Bilateral] : fibrocystic changes [No Discharge] : no discharge [No Masses] : no breast masses were palpable [Labia Majora] : normal [Labia Minora] : normal [Cystocele] : a cystocele [Rectocele] : a rectocele [Uterine Prolapse] : uterine prolapse [No Bleeding] : There was no active vaginal bleeding [Normal] : normal [Uterine Adnexae] : normal [FreeTextEntry6] : 1 degree uterine

## 2024-01-24 LAB
BACTERIA UR CULT: NORMAL
HPV 16 E6+E7 MRNA CVX QL NAA+PROBE: NOT DETECTED
HPV HIGH+LOW RISK DNA PNL CVX: NOT DETECTED
HPV18+45 E6+E7 MRNA CVX QL NAA+PROBE: NOT DETECTED

## 2024-01-26 ENCOUNTER — APPOINTMENT (OUTPATIENT)
Dept: RADIOLOGY | Facility: IMAGING CENTER | Age: 59
End: 2024-01-26
Payer: COMMERCIAL

## 2024-01-26 ENCOUNTER — APPOINTMENT (OUTPATIENT)
Dept: ULTRASOUND IMAGING | Facility: IMAGING CENTER | Age: 59
End: 2024-01-26
Payer: COMMERCIAL

## 2024-01-26 ENCOUNTER — OUTPATIENT (OUTPATIENT)
Dept: OUTPATIENT SERVICES | Facility: HOSPITAL | Age: 59
LOS: 1 days | End: 2024-01-26
Payer: COMMERCIAL

## 2024-01-26 DIAGNOSIS — E89.0 POSTPROCEDURAL HYPOTHYROIDISM: Chronic | ICD-10-CM

## 2024-01-26 DIAGNOSIS — N94.9 UNSPECIFIED CONDITION ASSOCIATED WITH FEMALE GENITAL ORGANS AND MENSTRUAL CYCLE: ICD-10-CM

## 2024-01-26 DIAGNOSIS — Z98.890 OTHER SPECIFIED POSTPROCEDURAL STATES: Chronic | ICD-10-CM

## 2024-01-26 DIAGNOSIS — N81.10 CYSTOCELE, UNSPECIFIED: ICD-10-CM

## 2024-01-26 DIAGNOSIS — C73 MALIGNANT NEOPLASM OF THYROID GLAND: ICD-10-CM

## 2024-01-26 LAB — CYTOLOGY CVX/VAG DOC THIN PREP: NORMAL

## 2024-01-26 PROCEDURE — 77080 DXA BONE DENSITY AXIAL: CPT

## 2024-01-26 PROCEDURE — 71046 X-RAY EXAM CHEST 2 VIEWS: CPT | Mod: 26

## 2024-01-26 PROCEDURE — 76536 US EXAM OF HEAD AND NECK: CPT | Mod: 26

## 2024-01-26 PROCEDURE — 76536 US EXAM OF HEAD AND NECK: CPT

## 2024-01-26 PROCEDURE — 77080 DXA BONE DENSITY AXIAL: CPT | Mod: 26

## 2024-01-26 PROCEDURE — 71046 X-RAY EXAM CHEST 2 VIEWS: CPT

## 2024-01-29 DIAGNOSIS — R05.2 SUBACUTE COUGH: ICD-10-CM

## 2024-01-30 ENCOUNTER — TRANSCRIPTION ENCOUNTER (OUTPATIENT)
Age: 59
End: 2024-01-30

## 2024-01-31 ENCOUNTER — RX CHANGE (OUTPATIENT)
Age: 59
End: 2024-01-31

## 2024-01-31 RX ORDER — FLUTICASONE PROPIONATE 110 UG/1
110 AEROSOL, METERED RESPIRATORY (INHALATION) TWICE DAILY
Qty: 1 | Refills: 1 | Status: DISCONTINUED | COMMUNITY
Start: 2024-01-29 | End: 2024-01-31

## 2024-02-01 ENCOUNTER — APPOINTMENT (OUTPATIENT)
Dept: ULTRASOUND IMAGING | Facility: IMAGING CENTER | Age: 59
End: 2024-02-01
Payer: COMMERCIAL

## 2024-02-01 ENCOUNTER — TRANSCRIPTION ENCOUNTER (OUTPATIENT)
Age: 59
End: 2024-02-01

## 2024-02-01 ENCOUNTER — OUTPATIENT (OUTPATIENT)
Dept: OUTPATIENT SERVICES | Facility: HOSPITAL | Age: 59
LOS: 1 days | End: 2024-02-01
Payer: COMMERCIAL

## 2024-02-01 DIAGNOSIS — Z98.890 OTHER SPECIFIED POSTPROCEDURAL STATES: Chronic | ICD-10-CM

## 2024-02-01 DIAGNOSIS — N81.10 CYSTOCELE, UNSPECIFIED: ICD-10-CM

## 2024-02-01 DIAGNOSIS — E89.0 POSTPROCEDURAL HYPOTHYROIDISM: Chronic | ICD-10-CM

## 2024-02-01 PROCEDURE — 76830 TRANSVAGINAL US NON-OB: CPT

## 2024-02-01 PROCEDURE — 76830 TRANSVAGINAL US NON-OB: CPT | Mod: 26

## 2024-02-01 PROCEDURE — 76856 US EXAM PELVIC COMPLETE: CPT | Mod: 26

## 2024-02-01 PROCEDURE — 76856 US EXAM PELVIC COMPLETE: CPT

## 2024-02-07 ENCOUNTER — TRANSCRIPTION ENCOUNTER (OUTPATIENT)
Age: 59
End: 2024-02-07

## 2024-02-14 ENCOUNTER — NON-APPOINTMENT (OUTPATIENT)
Age: 59
End: 2024-02-14

## 2024-02-14 ENCOUNTER — APPOINTMENT (OUTPATIENT)
Dept: ELECTROPHYSIOLOGY | Facility: CLINIC | Age: 59
End: 2024-02-14
Payer: COMMERCIAL

## 2024-02-14 PROCEDURE — 93298 REM INTERROG DEV EVAL SCRMS: CPT

## 2024-02-20 ENCOUNTER — APPOINTMENT (OUTPATIENT)
Dept: RHEUMATOLOGY | Facility: CLINIC | Age: 59
End: 2024-02-20
Payer: COMMERCIAL

## 2024-02-20 ENCOUNTER — APPOINTMENT (OUTPATIENT)
Dept: RHEUMATOLOGY | Facility: CLINIC | Age: 59
End: 2024-02-20

## 2024-02-20 VITALS
RESPIRATION RATE: 18 BRPM | HEART RATE: 105 BPM | DIASTOLIC BLOOD PRESSURE: 74 MMHG | BODY MASS INDEX: 25.33 KG/M2 | SYSTOLIC BLOOD PRESSURE: 112 MMHG | WEIGHT: 152 LBS | TEMPERATURE: 97.2 F | HEIGHT: 65 IN | OXYGEN SATURATION: 98 %

## 2024-02-20 DIAGNOSIS — M62.838 OTHER MUSCLE SPASM: ICD-10-CM

## 2024-02-20 PROCEDURE — G2211 COMPLEX E/M VISIT ADD ON: CPT

## 2024-02-20 PROCEDURE — 99214 OFFICE O/P EST MOD 30 MIN: CPT

## 2024-02-22 ENCOUNTER — APPOINTMENT (OUTPATIENT)
Dept: PULMONOLOGY | Facility: CLINIC | Age: 59
End: 2024-02-22
Payer: COMMERCIAL

## 2024-02-22 VITALS
RESPIRATION RATE: 16 BRPM | WEIGHT: 152 LBS | HEIGHT: 65 IN | OXYGEN SATURATION: 96 % | HEART RATE: 94 BPM | BODY MASS INDEX: 25.33 KG/M2 | SYSTOLIC BLOOD PRESSURE: 92 MMHG | DIASTOLIC BLOOD PRESSURE: 68 MMHG

## 2024-02-22 DIAGNOSIS — R05.9 COUGH, UNSPECIFIED: ICD-10-CM

## 2024-02-22 DIAGNOSIS — R93.89 ABNORMAL FINDINGS ON DIAGNOSTIC IMAGING OF OTHER SPECIFIED BODY STRUCTURES: ICD-10-CM

## 2024-02-22 PROCEDURE — 99205 OFFICE O/P NEW HI 60 MIN: CPT

## 2024-02-22 RX ORDER — FLUTICASONE FUROATE 100 UG/1
100 POWDER RESPIRATORY (INHALATION) DAILY
Qty: 1 | Refills: 1 | Status: DISCONTINUED | COMMUNITY
Start: 2024-02-02 | End: 2024-02-22

## 2024-02-22 RX ORDER — BUDESONIDE AND FORMOTEROL FUMARATE DIHYDRATE 80; 4.5 UG/1; UG/1
80-4.5 AEROSOL RESPIRATORY (INHALATION)
Qty: 1 | Refills: 5 | Status: ACTIVE | COMMUNITY
Start: 2024-02-22 | End: 1900-01-01

## 2024-02-22 RX ORDER — BUDESONIDE 90 UG/1
90 AEROSOL, POWDER RESPIRATORY (INHALATION)
Qty: 1 | Refills: 2 | Status: DISCONTINUED | COMMUNITY
Start: 1900-01-01 | End: 2024-02-22

## 2024-02-22 RX ORDER — ALBUTEROL SULFATE 90 UG/1
108 (90 BASE) AEROSOL, METERED RESPIRATORY (INHALATION)
Qty: 1 | Refills: 5 | Status: ACTIVE | COMMUNITY
Start: 2024-02-22 | End: 1900-01-01

## 2024-02-22 NOTE — PHYSICAL EXAM
[Supple] : supple [No Neck Mass] : no neck mass [Normal Rate/Rhythm] : normal rate/rhythm [Normal S1, S2] : normal s1, s2 [No Murmurs] : no murmurs [No Resp Distress] : no resp distress [No Acc Muscle Use] : no acc muscle use [Clear to Auscultation Bilaterally] : clear to auscultation bilaterally [Benign] : benign [Not Tender] : not tender [No HSM] : no hsm [No Hernias] : no hernias [Normal Gait] : normal gait [No Clubbing] : no clubbing [No Edema] : no edema [No Rash] : no rash [No Motor Deficits] : no motor deficits [Normal Affect] : normal affect [TextBox_2] : pleasant f no distress   speaking full sentences  no cough  [TextBox_11] : no lesion moist  [TextBox_44] : well healed   thryoid  sx scar  [TextBox_68] : prolonged      expiratory phase  [TextBox_132] : fluent speech    walking no issues

## 2024-02-22 NOTE — HISTORY OF PRESENT ILLNESS
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   --    PPD negative here for cough   -has fibromyalgia   chronic  pain on Cymbalta   doing well  on disability   due to this weight loss since dose changes  - asthmatic bronchitis -  when sick or cats  gets wheezing   -cough with singing at times  laughing       or sick  -per MD notes  cough   persisted after infection and flovent was added   2020ct  abd  compared to  2010    chart reviewed  ct 2010  lung stable since  2008  no further  ct  -LLL  stable  tiny nodule  -last cxr 1/2024  no acute dz -seen by allergist  + contact dermatitis    propolis iodopropynl gold  Na thioS  butylcarbonate  -  [TextBox_11] : 1 [TextBox_13] : 16 [YearQuit] : 1999

## 2024-02-22 NOTE — REVIEW OF SYSTEMS
[Recent Wt Loss (___ Lbs)] : ~T recent [unfilled] lb weight loss [Postnasal Drip] : postnasal drip [Cough] : cough [Chronic Pain] : chronic pain [Thyroid Problem] : thyroid problem [Fever] : no fever [Fatigue] : no fatigue [Recent Wt Gain (___ Lbs)] : ~T no recent weight gain [Chills] : no chills [Dry Eyes] : no dry eyes [Sore Throat] : no sore throat [Dry Mouth] : no dry mouth [Sinus Problems] : no sinus problems [Hemoptysis] : no hemoptysis [Chest Tightness] : no chest tightness [Sputum] : no sputum [Dyspnea] : no dyspnea [Wheezing] : no wheezing [Chest Discomfort] : no chest discomfort [Palpitations] : no palpitations [GERD] : no gerd [Abdominal Pain] : no abdominal pain [Nausea] : no nausea [Vomiting] : no vomiting [Dysphagia] : no dysphagia [Bleeding] : no bleeding [Rash] : no rash [Blood Transfusion] : no blood transfusion [Clotting Disorder/ Frequent bleeding] : no clotting disorder/ frequent bleeding [Diabetes] : no diabetes [Obesity] : no obesity [TextBox_3] : 25 pounds     loss

## 2024-02-22 NOTE — ASSESSMENT
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  return after PFT agreement on plan  discussion  chart reviewed  including old studies ct  with patient

## 2024-02-22 NOTE — PROCEDURE
[FreeTextEntry1] : chart reviewed   allergy testing  2/2023  contact dermatitis    propolis iodopropynl gold  Na thioS  butylcarbonate     PROCEDURE DATE: Oct 5 2010 . INTERPRETATION: Clinical data: Hematuria and left flank pain. History pulmonary nodules. FINDINGS: Helical CT of the thorax, abdomen, and pelvis was performed from the level of the thoracic inlet to the symphysis pubis without intravenous contrast. Examination is compared to the prior chest CT of 09/30/2009 and the abdominal CT scan of 06/26/2008. There is no significant axillary, hilar, or mediastinal lymphadenopathy. The heart is normal in size. There is mild pericardial thickening unchanged. No pleural effusions are noted. Multiple small pulmonary nodules are unchanged from 07/10/2008. These include bilateral intrapulmonary lymph nodes as well as a 4 mm left lower lobe nodule, series 3 image 96, a 3 mm superior segment right lower lobe nodule, image 49, and a 2 mm superior left lower lobe nodule, image 52. Central airways are patent. Abdomen and pelvis: No urinary tract calculus or hydronephrosis is visualized. There is no evident abnormality of the urinary bladder. The uterus is mildly enlarged. No bowel obstruction is demonstrated. The appendix is normal aside for presence of retained contrast or an appendicolith. Noncontrast views of the upper abdomen reveal no abnormality of the liver, spleen, kidneys, adrenal glands, pancreas, gallbladder, or biliary tree. There is no significant retroperitoneal lymphadenopathy. Skeletal structures are unremarkable. IMPRESSION: Multiple pulmonary nodules unchanged from 07/10/2008 which require no further evaluation. Cause of left flank pain and hematuria not identified DIANE CEBALLOS M.D., ATTENDING RADIOLOGIST This examination was interpreted on:  {orig_read_dtime   } This document has been electronically signed. Oct 5 2010 4:57PM

## 2024-02-23 PROBLEM — M62.838 MUSCLE SPASM: Status: ACTIVE | Noted: 2024-02-23
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
## 2024-02-23 NOTE — PHYSICAL EXAM
[General Appearance - Alert] : alert [General Appearance - In No Acute Distress] : in no acute distress [General Appearance - Well Nourished] : well nourished [Sclera] : the sclera and conjunctiva were normal [PERRL With Normal Accommodation] : pupils were equal in size, round, and reactive to light [Extraocular Movements] : extraocular movements were intact [Outer Ear] : the ears and nose were normal in appearance [Neck Appearance] : the appearance of the neck was normal [Auscultation Breath Sounds / Voice Sounds] : lungs were clear to auscultation bilaterally [Heart Rate And Rhythm] : heart rate was normal and rhythm regular [Heart Sounds] : normal S1 and S2 [Edema] : there was no peripheral edema [No CVA Tenderness] : no ~M costovertebral angle tenderness [No Spinal Tenderness] : no spinal tenderness [Nail Clubbing] : no clubbing  or cyanosis of the fingernails [Musculoskeletal - Swelling] : no joint swelling seen [Motor Tone] : muscle strength and tone were normal [] : no rash [Motor Exam] : the motor exam was normal [No Focal Deficits] : no focal deficits [Oriented To Time, Place, And Person] : oriented to person, place, and time [FreeTextEntry1] : Strength intact but gait slightly off balance and tentative - stable but persistent

## 2024-02-23 NOTE — ASSESSMENT
[FreeTextEntry1] : FAISAL MOFFETT is a 58 year old woman with prior dx of FMS which I agree with given sx as above and exam with diffuse soft tissue tender points, skin hypersensitivity, appearing fatigued. Pt also with mild dry eyes and dry mouth chronically, slightly imbalanced gait and with hx of issues with balance and falls. Remains with paucity of inflammatory arthritis or CTD sx. Responsive to low dose prednisone for markedly worse FMS sx but other medical issues making it not an ideal long term med. Did not tolerate trial of Savella. Nerve blocks are helping partially, also now established with psych who is helping to guide medication.   # FMS  - c/w duloxetine 50mg - dosing from psych  - c/w Lyrica - she is using it variably between 1-3x /day which she feels is working well - c/w at least once daily dosing, try for 2 doses - afternoon, evening but taper back if becomes too dizzy  - c/w flexeril but try QHS consistently - will make change in Lyrica first then this to avoid polypharmacy related SE  - c/w low dose prednisone PRN sparingly for severe flares - reports she hasn't had to use - I remain on board with any changes/input from psychiatry as I suspect this is a concomitant problem.  - c/w stretching, light aerobic exercises, massage, heat as adjuncts for FMS pain.  - c/w nonpharmacologic FMS therapies as well - encouraged to incorporate small activities that she finds beneficial during her day, optimize stretching and light exercise, and be mindful of mood, be aware not to overextend on days she has less pain, ensure adequate rest between strenuous activities.  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

## 2024-02-23 NOTE — REVIEW OF SYSTEMS
[Feeling Poorly] : feeling poorly [Feeling Tired] : feeling tired [Dry Eyes] : dryness of the eyes [Cough] : cough [Arthralgias] : arthralgias [Joint Pain] : joint pain [Difficulty Walking] : difficulty walking [Sleep Disturbances] : sleep disturbances [Anxiety] : anxiety [Depression] : depression [As Noted in HPI] : as noted in HPI [Hot Flashes] : hot flashes [Joint Swelling] : no joint swelling [Joint Stiffness] : no joint stiffness [Suicidal] : not suicidal [Negative] : Respiratory [FreeTextEntry3] : + tension headaches

## 2024-03-12 ENCOUNTER — APPOINTMENT (OUTPATIENT)
Dept: PEDIATRIC ALLERGY IMMUNOLOGY | Facility: CLINIC | Age: 59
End: 2024-03-12
Payer: COMMERCIAL

## 2024-03-12 VITALS
HEIGHT: 65 IN | HEART RATE: 76 BPM | DIASTOLIC BLOOD PRESSURE: 70 MMHG | RESPIRATION RATE: 16 BRPM | SYSTOLIC BLOOD PRESSURE: 100 MMHG | OXYGEN SATURATION: 97 % | WEIGHT: 150 LBS | BODY MASS INDEX: 24.99 KG/M2

## 2024-03-12 DIAGNOSIS — J30.81 ALLERGIC RHINITIS DUE TO ANIMAL (CAT) (DOG) HAIR AND DANDER: ICD-10-CM

## 2024-03-12 DIAGNOSIS — Z91.038 OTHER INSECT ALLERGY STATUS: ICD-10-CM

## 2024-03-12 DIAGNOSIS — Z91.09 OTHER ALLERGY STATUS, OTHER THAN TO DRUGS AND BIOLOGICAL SUBSTANCES: ICD-10-CM

## 2024-03-12 PROCEDURE — 99203 OFFICE O/P NEW LOW 30 MIN: CPT | Mod: 25

## 2024-03-12 PROCEDURE — 95004 PERQ TESTS W/ALRGNC XTRCS: CPT

## 2024-03-12 PROCEDURE — 99213 OFFICE O/P EST LOW 20 MIN: CPT | Mod: 25

## 2024-03-12 NOTE — REVIEW OF SYSTEMS
[Difficulty Breathing] : dyspnea [Cough] : cough [Wheezing] : wheezing [Urticaria] : urticaria [Nl] : Gastrointestinal [de-identified] : + rash

## 2024-03-12 NOTE — PHYSICAL EXAM
[Alert] : alert [Well Nourished] : well nourished [No Acute Distress] : no acute distress [Well Developed] : well developed [No Discharge] : no discharge [Sclera Not Icteric] : sclera not icteric [Conjunctival Erythema] : no conjunctival erythema [Normal Rate and Effort] : normal respiratory rhythm and effort [No Crackles] : no crackles [Wheezing] : no wheezing was heard [Normal Rate] : heart rate was normal  [Normal S1, S2] : normal S1 and S2 [Regular Rhythm] : with a regular rhythm

## 2024-03-12 NOTE — REASON FOR VISIT
[Initial Consultation] : an initial consultation for [Allergy Evaluation/ Skin Testing] : allergy evaluation and or skin testing [Asthma] : asthma

## 2024-03-12 NOTE — HISTORY OF PRESENT ILLNESS
[Allergic Rhinitis] : allergic rhinitis [Eczematous rashes] : eczematous rashes [Venom Reactions] : venom reactions [Food Allergies] : food allergies [Drug Allergies] : drug allergies [de-identified] : 58 year old woman who presents for evaluation of allergies.  She was seen by Dr. Boxer previously for rash and had patch testing.  However, patient doesn't feel the patch testing is accurate as she still has exposure to gold and propolis.  She had the patch testing completed due to having poison ivy x 6 in one year.  She sees a dermatologist yearly.    Asthma - recently diagnosed  Cats trigger wheezing, swelling of hives.    Wheat - stomach bloating, constipation, agida, has been on a gluten free diet for 15 years.  allergy testing 2/2023 patch testing: propolis iodopropynl, gold Na, thioS butylcarbonate

## 2024-03-12 NOTE — SOCIAL HISTORY
[Dog] : dog [Bedroom] : not in the bedroom [Smokers in Household] : there are no smokers in the home [Living Area] : not in the living area

## 2024-03-12 NOTE — IMPRESSION
[Allergy Testing Mixed Feathers] : feathers [_____] : cat ([unfilled]) [] : molds [Allergy Testing Trees] : trees [Allergy Testing Weeds] : weeds [Allergy Testing Grasses] : grasses

## 2024-03-14 ENCOUNTER — APPOINTMENT (OUTPATIENT)
Dept: PSYCHIATRY | Facility: CLINIC | Age: 59
End: 2024-03-14
Payer: COMMERCIAL

## 2024-03-14 PROCEDURE — 99214 OFFICE O/P EST MOD 30 MIN: CPT

## 2024-03-14 NOTE — PLAN
[No] : No [Medication education provided] : Medication education provided. [FreeTextEntry4] : Meeting treatment goals and is considering doing things on her "bucket list".  [Rationale for medication choices, possible risks/precautions, benefits, alternative treatment choices, and consequences of non-treatment discussed] : Rationale for medication choices, possible risks/precautions, benefits, alternative treatment choices, and consequences of non-treatment discussed with patient/family/caregiver  [FreeTextEntry5] : Pt has as a goal seeing her children in [facility] for manjarrez blossom time.  Encouraged continued self-care, emotional support given regarding potential loss and grief.

## 2024-03-14 NOTE — HISTORY OF PRESENT ILLNESS
[FreeTextEntry1] : Pt reports that she is feeling upset surrounding the sickness of both of her beloved pets and her mother.  States that her mother has been recently hospitalized and finds it difficult to bridge the disconnect in her past with her mother and her current illness. Reports sibling dynamics that have contributed to conflict in the situation. Pt reports that she has been crying often due to these circumstances.  Otherwise, denies any change in appetite, weight loss, or sleep patterns. No history of baldev, or psychosis, or delusional thought process.

## 2024-03-14 NOTE — RISK ASSESSMENT
[Low acute suicide risk] : Low acute suicide risk [No, patient denies ideation or behavior] : No, patient denies ideation or behavior [No] : No [No, Reason: ____] : Safety Plan completed/updated (for individuals at risk): No, Reason: [unfilled]

## 2024-03-14 NOTE — PHYSICAL EXAM
[Well groomed] : well groomed [Cooperative] : cooperative [Euthymic] : euthymic [Full] : full [Clear] : clear [Linear/Goal Directed] : linear/goal directed [None] : none [None Reported] : none reported [Average] : average [WNL] : within normal limits [FreeTextEntry5] : Pt restless and fidgeting in chair.  [de-identified] : Pt is intermittently tearful when discussing her mother's current hospitalization.

## 2024-03-20 ENCOUNTER — NON-APPOINTMENT (OUTPATIENT)
Age: 59
End: 2024-03-20

## 2024-03-20 ENCOUNTER — APPOINTMENT (OUTPATIENT)
Dept: ELECTROPHYSIOLOGY | Facility: CLINIC | Age: 59
End: 2024-03-20
Payer: COMMERCIAL

## 2024-03-20 PROCEDURE — 93298 REM INTERROG DEV EVAL SCRMS: CPT

## 2024-03-21 ENCOUNTER — OUTPATIENT (OUTPATIENT)
Dept: OUTPATIENT SERVICES | Facility: HOSPITAL | Age: 59
LOS: 1 days | End: 2024-03-21
Payer: COMMERCIAL

## 2024-03-21 DIAGNOSIS — Z98.890 OTHER SPECIFIED POSTPROCEDURAL STATES: Chronic | ICD-10-CM

## 2024-03-21 DIAGNOSIS — R05.9 COUGH, UNSPECIFIED: ICD-10-CM

## 2024-03-21 DIAGNOSIS — E89.0 POSTPROCEDURAL HYPOTHYROIDISM: Chronic | ICD-10-CM

## 2024-03-21 PROCEDURE — 94060 EVALUATION OF WHEEZING: CPT

## 2024-03-21 PROCEDURE — 94060 EVALUATION OF WHEEZING: CPT | Mod: 26

## 2024-03-22 ENCOUNTER — NON-APPOINTMENT (OUTPATIENT)
Age: 59
End: 2024-03-22

## 2024-03-22 ENCOUNTER — APPOINTMENT (OUTPATIENT)
Dept: ENDOCRINOLOGY | Facility: CLINIC | Age: 59
End: 2024-03-22
Payer: COMMERCIAL

## 2024-03-22 VITALS
TEMPERATURE: 97.6 F | DIASTOLIC BLOOD PRESSURE: 60 MMHG | SYSTOLIC BLOOD PRESSURE: 110 MMHG | BODY MASS INDEX: 25.33 KG/M2 | WEIGHT: 152 LBS | RESPIRATION RATE: 16 BRPM | HEART RATE: 80 BPM | HEIGHT: 65 IN | OXYGEN SATURATION: 98 %

## 2024-03-22 PROCEDURE — G2211 COMPLEX E/M VISIT ADD ON: CPT

## 2024-03-22 PROCEDURE — 99215 OFFICE O/P EST HI 40 MIN: CPT

## 2024-03-22 NOTE — HISTORY OF PRESENT ILLNESS
[FreeTextEntry1] : Problems: 1. Papillary thyroid cancer 2. Primary Hypothyroidism  Papillary Thyroid cancer/Primary Hypothyroidism Diagnosed in 2006/2007 with thyroid cancer (patient stated she had a follicular variant of papillary thyroid cancer) and she underwent total thyroidectomy in 2006/2007 at [facility] (I did not see the pathology report) - she underwent radioactive iodine therapy at [facility] in 2007 (dose unknown). The patient stated she never had metastases of her thyroid cancer.  10/28/2022 - TSH 12.8 (elevated) 2 Monitoring for recurrence: 2015 to October 2023 - Thyroglobulin antibodies undetectable, thyroglobulin undetectable 10/03/2023 - TSH 0.23 (low), Antithyroglobulin antibodies undetectable, thyroglobulin undetectable 01/26/2204 - US thyroid - Thyroid bed unremarkable, no cervical adenopathy 3. Two sisters had thyroid cancer (she does not know the type of thyroid cancer), no personal history of radiation treatment 4. Meds: Levothyroxine (generic) 112 micrograms po Monday to Friday - fully compliant and patient advised on the appropriate use of levothyroxine.

## 2024-03-22 NOTE — PHYSICAL EXAM
[de-identified] : General: No distress, well nourished Eyes: Normal Sclera, EOMI, PERRL ENT: Normal appearance of the nose, normal oropharynx Neck/Thyroid: No neck masses Respiratory: No use of accessory muscles of respiration, vesicular breath sounds heard bilaterally, no crepitations or ronchi Cardiovascular: S1 and S2 heard and normal, no S3 or S4, no murmurs, radial pulse normal bilaterally Abdomen: soft, non-tender, no masses, normal bowel sounds Musculoskeletal: No swelling or deformities of joints of hands, no pedal edema Neurological: Normal range of motion in the hands, Normal brachioradialis reflexes bilaterally Psychiatry: Patient converses normally, good judgement and insight Skin: No rashes in hands, no nodules palpated in hands

## 2024-03-22 NOTE — ASSESSMENT
[FreeTextEntry1] : Target: TSH in the normal range for the testing lab   This patient was diagnosed with papillary thyroid cancer in 2007 s/p total thyroidectomy and MADRID. She did not have recurrence and thyroglobulin antibody and thyroglobulin levels were undetectable in October 2023 and thyroid US revealed no recurrence in Jan 2024.  Since it has been more than 10 years since her diagnosis of thyroid cancer and she did not have a recurrence, there is no need for further monitoring for recurrence and her goal TSH is the normal range for the testing lab.  Last TSH was at goal but the dose of levothyroxine was changed at that time for an unknown reason. I am rechecking her TSH level.  Plan: 1. Continue levothyroxine 112 micrograms po Monday to Friday 2. Labs to be done today - TSH 3. Follow up in 2 to 7 days to review results - telehealth visit ok

## 2024-03-25 LAB — TSH SERPL-ACNC: 0.64 UIU/ML

## 2024-03-28 ENCOUNTER — APPOINTMENT (OUTPATIENT)
Dept: ENDOCRINOLOGY | Facility: CLINIC | Age: 59
End: 2024-03-28
Payer: COMMERCIAL

## 2024-03-28 DIAGNOSIS — E03.9 HYPOTHYROIDISM, UNSPECIFIED: ICD-10-CM

## 2024-03-28 DIAGNOSIS — C73 MALIGNANT NEOPLASM OF THYROID GLAND: ICD-10-CM

## 2024-03-28 PROCEDURE — 99214 OFFICE O/P EST MOD 30 MIN: CPT

## 2024-03-28 RX ORDER — LEVOTHYROXINE SODIUM 0.11 MG/1
112 TABLET ORAL
Qty: 1 | Refills: 3 | Status: ACTIVE | COMMUNITY
Start: 2023-06-20 | End: 1900-01-01

## 2024-03-28 NOTE — ASSESSMENT
[FreeTextEntry1] : Target: TSH in the normal range for the testing lab  This patient was diagnosed with papillary thyroid cancer in 2007 s/p total thyroidectomy and MADRID. She did not have recurrence and thyroglobulin antibody and thyroglobulin levels were undetectable in October 2023 and thyroid US revealed no recurrence in Jan 2024. Since it has been more than 10 years since her diagnosis of thyroid cancer and she did not have a recurrence, there is no need for further monitoring for recurrence and her goal TSH is the normal range for the testing lab.  Last TSH was at goal.  Last TSH - March 2024 - N  Plan: 1. Continue levothyroxine 112 micrograms po Monday to Friday 2. Labs to be done in 6 months - TSH 3. Follow up in 6 months to review results - in person visit to do neck exams

## 2024-03-28 NOTE — PHYSICAL EXAM
[de-identified] : General: No distress, well nourished Eyes: Normal external appearance ENT: Normal appearance of the nose Neck/Thyroid: No visible neck swelling Respiratory: No use of accessory muscles of respiration Psychiatry: Patient converses normally, good judgement and insight Skin: No rashes seen on face

## 2024-03-28 NOTE — REASON FOR VISIT
[Home] : at home, [unfilled] , at the time of the visit. [Medical Office: ([facility] [place]___] : at the medical office located in  [Patient] : the patient [Follow - Up] : a follow-up visit [Hypothyroidism] : hypothyroidism [Thyroid Cancer] : thyroid cancer

## 2024-03-28 NOTE — HISTORY OF PRESENT ILLNESS
[FreeTextEntry1] : Problems: 1. Papillary thyroid cancer 2. Primary Hypothyroidism  Papillary Thyroid cancer/Primary Hypothyroidism Diagnosed in 2006/2007 with thyroid cancer (patient stated she had a follicular variant of papillary thyroid cancer) and she underwent total thyroidectomy in 2006/2007 at [facility] (I did not see the pathology report) - she underwent radioactive iodine therapy at [facility] in 2007 (dose unknown). The patient stated she never had metastases of her thyroid cancer.  10/28/2022 - TSH 12.8 (elevated) 2 Monitoring for recurrence: 2015 to October 2023 - Thyroglobulin antibodies undetectable, thyroglobulin undetectable 10/03/2023 - TSH 0.23 (low), Antithyroglobulin antibodies undetectable, thyroglobulin undetectable 01/26/2204 - US thyroid - Thyroid bed unremarkable, no cervical adenopathy 3. Two sisters had thyroid cancer (she does not know the type of thyroid cancer), no personal history of radiation treatment 4. Meds: Levothyroxine (generic) 112 micrograms po Monday to Friday - fully compliant and patient advised on the appropriate use of levothyroxine.

## 2024-04-01 ENCOUNTER — APPOINTMENT (OUTPATIENT)
Dept: FAMILY MEDICINE | Facility: CLINIC | Age: 59
End: 2024-04-01
Payer: COMMERCIAL

## 2024-04-01 VITALS
BODY MASS INDEX: 24.79 KG/M2 | SYSTOLIC BLOOD PRESSURE: 117 MMHG | HEART RATE: 103 BPM | RESPIRATION RATE: 14 BRPM | OXYGEN SATURATION: 95 % | TEMPERATURE: 98.6 F | DIASTOLIC BLOOD PRESSURE: 80 MMHG | WEIGHT: 149 LBS

## 2024-04-01 DIAGNOSIS — R89.9 UNSPECIFIED ABNORMAL FINDING IN SPECIMENS FROM OTHER ORGANS, SYSTEMS AND TISSUES: ICD-10-CM

## 2024-04-01 DIAGNOSIS — D72.829 ELEVATED WHITE BLOOD CELL COUNT, UNSPECIFIED: ICD-10-CM

## 2024-04-01 DIAGNOSIS — E53.8 DEFICIENCY OF OTHER SPECIFIED B GROUP VITAMINS: ICD-10-CM

## 2024-04-01 DIAGNOSIS — R73.03 PREDIABETES.: ICD-10-CM

## 2024-04-01 DIAGNOSIS — R53.83 OTHER FATIGUE: ICD-10-CM

## 2024-04-01 PROCEDURE — 99396 PREV VISIT EST AGE 40-64: CPT

## 2024-04-03 NOTE — REVIEW OF SYSTEMS
[Suicidal] : not suicidal [Anxiety] : anxiety [Depression] : depression [Negative] : Musculoskeletal

## 2024-04-03 NOTE — HISTORY OF PRESENT ILLNESS
[de-identified] : 58-year-old female presents for annual health maintenance and physical examination. She has a history of papillary thyroid cancer in [id number], for which she underwent radioactive iodine ablation and total thyroidectomy. Charlotte is on Levothyroxine 112 micrograms from Monday to Friday. She also experiences fibromyalgia and takes Cymbalta to manage her pain. She was previously on Amitriptyline. She regularly sees a psychiatrist, Dr. Cornel Obando and is currently dealing with significant personal stress related to sickness amongst loved ones and pets. Charlotte also has self-injected B12 for low B12 levels and requested a B12 check. Charlotte presents with various complaints, including mood changes, crying, numbness in arms, and pain related to kidney stones. She experiences dietary issues linked with gluten-intolerance and is worried about potentially pre-diabetic and high cholesterol level. She was pre-diabetic and had high cholesterol but successfully managed to lose weight and control these conditions. Lately, she has experienced crying episodes and arm numbness, which she attributes to low B12 levels. Prior diagnostic results indicate that Charlotte's B12 levels have gradually risen from 194 in February 2023 to 508 in December of the same year.

## 2024-04-12 ENCOUNTER — NON-APPOINTMENT (OUTPATIENT)
Age: 59
End: 2024-04-12

## 2024-04-12 ENCOUNTER — APPOINTMENT (OUTPATIENT)
Dept: CARDIOLOGY | Facility: CLINIC | Age: 59
End: 2024-04-12
Payer: COMMERCIAL

## 2024-04-12 VITALS
WEIGHT: 152 LBS | BODY MASS INDEX: 23.04 KG/M2 | OXYGEN SATURATION: 97 % | SYSTOLIC BLOOD PRESSURE: 147 MMHG | HEART RATE: 73 BPM | HEIGHT: 68 IN | RESPIRATION RATE: 19 BRPM | DIASTOLIC BLOOD PRESSURE: 88 MMHG

## 2024-04-12 DIAGNOSIS — R07.89 OTHER CHEST PAIN: ICD-10-CM

## 2024-04-12 DIAGNOSIS — R94.31 ABNORMAL ELECTROCARDIOGRAM [ECG] [EKG]: ICD-10-CM

## 2024-04-12 PROCEDURE — 99214 OFFICE O/P EST MOD 30 MIN: CPT | Mod: 25

## 2024-04-12 PROCEDURE — 93000 ELECTROCARDIOGRAM COMPLETE: CPT

## 2024-04-12 NOTE — PHYSICAL EXAM
[Normal Conjunctiva] : normal conjunctiva [Normal] : moves all extremities, no focal deficits, normal speech [Appears Anxious] : appears anxious [de-identified] : no acute distress [de-identified] : supple [de-identified] : JVP ~ 7 cm H20, RRR, s1, s2, no murmurs [de-identified] : unlabored respirations, clear lung fields [de-identified] : non-distended [de-identified] : no lower extremity edema

## 2024-04-12 NOTE — ASSESSMENT
[FreeTextEntry1] : Assessment: Rosaura Elmore is a 58 year old woman with past medical history of Normal coronaries (2021), Lacunar CVA (s/p ILR), Cervical stenosis, Fibromyalgia, Gastritis, Hiatal hernia, Anxiety & Depression presents for follow up visit.  Since her last visit she reports that her fibromyalgia symptoms have returned and she has a headache today. She denies chest pain today but continues to have random sharp chest pain in the middle of her chest, also had an episode of dyspnea yesterday. ECG today remains abnormal and consistent with sinus rhythm and nonspecific ST abnormalities, similar to prior ECG. Prior echocardiogram (9/2022) consistent with normal LV and RV systolic function. Coronary angiogram (9/2021) consistent with normal coronary arteries. Chart review indicates recent ILR transmissions were negative for arrhythmias. Symptoms are atypical for cardiac causes, but due to persistence of chest pain, recommend re-evaluation for structural heart disease.  Recommendations: [] Chest discomfort: Chronic and atypical. Prior echocardiogram (9/2022) was unremarkable, no regional wall motion abnormalities. In addition, patient had normal coronary angiogram in 9/2021 which makes obstructive CAD unlikely at this time. No signs of arrhythmia on ILR interrogation reports (ILR to be removed 11/2024). Due to persistence of symptoms will check echocardiogram to ensure no pericardial effusion given chronic inflammatory conditions. Patient takes Aspirin 81 mg daily. [] Risk factors: 10 yr ASCVD risk score 1.6% is low,  mg/dl in 4/2024 is improvement from 122 mg/dl, continue lifestyle modifications with AHA/Mediterranean diet and aerobic exercise regimen as tolerated. No indication for lipid lowering medication at this time.  [] Return to office: August 2024 or sooner if needed

## 2024-04-12 NOTE — HISTORY OF PRESENT ILLNESS
[FreeTextEntry1] : Rosaura Elmore is a 58 year old woman with past medical history of Normal coronaries (2021), Lacunar CVA (s/p ILR), Cervical stenosis, Fibromyalgia, Gastritis, Hiatal hernia,  Anxiety & Depression presents for follow up visit.  Since her last visit she reports that her fibromyalgia symptoms have returned and she has a headache today. She denies chest pain today but continues to have random sharp chest pain in the middle of her chest. Also had an episode of shortness of breath yesterday. Reports recent stress taking care of her mother who is ill.

## 2024-04-12 NOTE — CARDIOLOGY SUMMARY
[de-identified] : ECG (9/8/22): sinus bradycardia, nonspecific ST abnormalities (similar to prior ECG) ECG (9/16/22): sinus bradycardia, nonspecific ST abnormalities  ECG (1/19/23): normal sinus rhythm, nonspecific ST abnormalities, artifact ECG (4/20/23): normal sinus rhythm, nonspecific ST abnormalities  ECG (12/15/23): normal sinus rhythm, RSR', nonspecific ST abnormalities  ECG (4/12/24); normal sinus rhythm, nonspecific ST abnormalities [de-identified] : Nuclear Stress Test (8/2021): Medium sized defect suggestive of ischemia  [de-identified] : TTE (8/2021): LVEF 63%. Normal RV size and function. Mild MR. No pericardial effusion.  TTE (9/2022): LVEF 60-65%. Normal RV size and function. No pericardial effusion.  [de-identified] : Coronary angiogram (9/2021): Normal coronary arteries.

## 2024-04-12 NOTE — REVIEW OF SYSTEMS
[Feeling Fatigued] : feeling fatigued [Chest Discomfort] : chest discomfort [Headache] : headache [Blurry Vision] : no blurred vision [SOB] : shortness of breath [Lower Ext Edema] : no extremity edema [Palpitations] : no palpitations [Syncope] : no syncope [Cough] : no cough [Abdominal Pain] : no abdominal pain [Joint Pain] : joint pain [Rash] : no rash [Dizziness] : no dizziness [Confusion] : no confusion was observed [Easy Bruising] : no tendency for easy bruising

## 2024-04-15 ENCOUNTER — TRANSCRIPTION ENCOUNTER (OUTPATIENT)
Age: 59
End: 2024-04-15

## 2024-04-15 RX ORDER — CYCLOBENZAPRINE HYDROCHLORIDE 10 MG/1
10 TABLET, FILM COATED ORAL 3 TIMES DAILY
Qty: 270 | Refills: 1 | Status: ACTIVE | COMMUNITY
Start: 2021-05-03 | End: 1900-01-01

## 2024-04-18 ENCOUNTER — APPOINTMENT (OUTPATIENT)
Dept: PULMONOLOGY | Facility: CLINIC | Age: 59
End: 2024-04-18
Payer: COMMERCIAL

## 2024-04-18 VITALS
SYSTOLIC BLOOD PRESSURE: 104 MMHG | DIASTOLIC BLOOD PRESSURE: 70 MMHG | WEIGHT: 152 LBS | HEART RATE: 86 BPM | HEIGHT: 68 IN | OXYGEN SATURATION: 99 % | RESPIRATION RATE: 16 BRPM | BODY MASS INDEX: 23.04 KG/M2

## 2024-04-18 DIAGNOSIS — J45.909 UNSPECIFIED ASTHMA, UNCOMPLICATED: ICD-10-CM

## 2024-04-18 PROCEDURE — G2211 COMPLEX E/M VISIT ADD ON: CPT

## 2024-04-18 PROCEDURE — 99213 OFFICE O/P EST LOW 20 MIN: CPT

## 2024-04-18 NOTE — REVIEW OF SYSTEMS
[Recent Wt Loss (___ Lbs)] : ~T recent [unfilled] lb weight loss [Postnasal Drip] : postnasal drip [Cough] : cough [Chronic Pain] : chronic pain [Thyroid Problem] : thyroid problem [Seasonal Allergies] : seasonal allergies [Fever] : no fever [Fatigue] : no fatigue [Recent Wt Gain (___ Lbs)] : ~T no recent weight gain [Chills] : no chills [Dry Eyes] : no dry eyes [Sore Throat] : no sore throat [Dry Mouth] : no dry mouth [Sinus Problems] : no sinus problems [Hemoptysis] : no hemoptysis [Chest Tightness] : no chest tightness [Sputum] : no sputum [Dyspnea] : no dyspnea [Wheezing] : no wheezing [Chest Discomfort] : no chest discomfort [Palpitations] : no palpitations [GERD] : no gerd [Abdominal Pain] : no abdominal pain [Nausea] : no nausea [Vomiting] : no vomiting [Dysphagia] : no dysphagia [Bleeding] : no bleeding [Rash] : no rash [Blood Transfusion] : no blood transfusion [Clotting Disorder/ Frequent bleeding] : no clotting disorder/ frequent bleeding [Diabetes] : no diabetes [Obesity] : no obesity [TextBox_3] : 25 pounds     loss

## 2024-04-18 NOTE — PHYSICAL EXAM
[II] : Mallampati Class: II [Normal Appearance] : normal appearance [Supple] : supple [No Neck Mass] : no neck mass [No JVD] : no jvd [Normal Rate/Rhythm] : normal rate/rhythm [Normal S1, S2] : normal s1, s2 [No Murmurs] : no murmurs [No Resp Distress] : no resp distress [No Acc Muscle Use] : no acc muscle use [Clear to Auscultation Bilaterally] : clear to auscultation bilaterally [Benign] : benign [Not Tender] : not tender [No Masses] : no masses [Soft] : soft [No Hernias] : no hernias [Normal Bowel Sounds] : normal bowel sounds [Normal Gait] : normal gait [No Clubbing] : no clubbing [No Edema] : no edema [No Rash] : no rash [No Motor Deficits] : no motor deficits [Normal Affect] : normal affect [TextBox_2] : pleasant f no distress speaking full sentences no cough  [TextBox_11] : no lesion moist

## 2024-04-18 NOTE — ASSESSMENT
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

## 2024-04-18 NOTE — HISTORY OF PRESENT ILLNESS
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   --    PPD negative here for cough   -has fibromyalgia   chronic  pain on Cymbalta   doing well  on disability   due to this weight loss since dose changes  - asthmatic bronchitis -  when sick or cats  gets wheezing   -cough with singing at times  laughing       or sick  -per MD notes  cough   persisted after infection and flovent was added   2020ct  abd  compared to  2010    chart reviewed  ct 2010  lung stable since  2008  no further  ct  -LLL  stable  tiny nodule  -last cxr 1/2024  no acute dz -seen by allergist  + contact dermatitis    propolis iodopropynl gold  Na thioS  butylcarbonate  - 4/18/2024 57y/o  female  ex smoker   h/o thyroid cancer    --  asthma   allergic    - currently   some   allergies -  pear tree and now some mucous - albuterol  prn only   - PFT reviewed with patient    [TextBox_11] : 1 [TextBox_13] : 16 [YearQuit] : 1999

## 2024-04-18 NOTE — PROCEDURE
[FreeTextEntry1] : 59y/o female ex smoker BMI  PFT 3/21/2024 Good efforts Spirometry suggestive of normal FVC FEV1 and FEV1/FVC ratio There is no significant bronchodilator response FEF 25-75% is normal  MVV is reduced suggestive of normal SVC and ERV  impression Spirometry is normal although there is no significant bronchodilator response an increase in 75% is seen in FEF 75% post bronchodilator is seen -Reduced MVV maybe due to poor efforts or neuromuscular weakness recommend clinical correlation chart reviewed     allergy testing  2/2023  contact dermatitis    propolis iodopropynl gold  Na thioS  butylcarbonate     PROCEDURE DATE: Oct 5 2010 . INTERPRETATION: Clinical data: Hematuria and left flank pain. History pulmonary nodules. FINDINGS: Helical CT of the thorax, abdomen, and pelvis was performed from the level of the thoracic inlet to the symphysis pubis without intravenous contrast. Examination is compared to the prior chest CT of 09/30/2009 and the abdominal CT scan of 06/26/2008. There is no significant axillary, hilar, or mediastinal lymphadenopathy. The heart is normal in size. There is mild pericardial thickening unchanged. No pleural effusions are noted. Multiple small pulmonary nodules are unchanged from 07/10/2008. These include bilateral intrapulmonary lymph nodes as well as a 4 mm left lower lobe nodule, series 3 image 96, a 3 mm superior segment right lower lobe nodule, image 49, and a 2 mm superior left lower lobe nodule, image 52. Central airways are patent. Abdomen and pelvis: No urinary tract calculus or hydronephrosis is visualized. There is no evident abnormality of the urinary bladder. The uterus is mildly enlarged. No bowel obstruction is demonstrated. The appendix is normal aside for presence of retained contrast or an appendicolith. Noncontrast views of the upper abdomen reveal no abnormality of the liver, spleen, kidneys, adrenal glands, pancreas, gallbladder, or biliary tree. There is no significant retroperitoneal lymphadenopathy. Skeletal structures are unremarkable. IMPRESSION: Multiple pulmonary nodules unchanged from 07/10/2008 which require no further evaluation. Cause of left flank pain and hematuria not identified DIANE CEBALLOS M.D., ATTENDING RADIOLOGIST This examination was interpreted on:   {orig_read_dtime     \\} This document has been electronically signed. Oct 5 2010 4:57PM

## 2024-04-18 NOTE — REASON FOR VISIT
[Follow-Up] : a follow-up visit [Abnormal CXR/ Chest CT] : an abnormal CXR/ chest CT [Cough] : cough [Asthma] : asthma

## 2024-04-23 ENCOUNTER — NON-APPOINTMENT (OUTPATIENT)
Age: 59
End: 2024-04-23

## 2024-04-23 ENCOUNTER — APPOINTMENT (OUTPATIENT)
Dept: RHEUMATOLOGY | Facility: CLINIC | Age: 59
End: 2024-04-23

## 2024-04-23 ENCOUNTER — APPOINTMENT (OUTPATIENT)
Dept: ELECTROPHYSIOLOGY | Facility: CLINIC | Age: 59
End: 2024-04-23
Payer: COMMERCIAL

## 2024-04-23 VITALS
HEIGHT: 68 IN | SYSTOLIC BLOOD PRESSURE: 90 MMHG | TEMPERATURE: 97.6 F | HEART RATE: 81 BPM | DIASTOLIC BLOOD PRESSURE: 68 MMHG | RESPIRATION RATE: 17 BRPM | WEIGHT: 149 LBS | BODY MASS INDEX: 22.58 KG/M2 | OXYGEN SATURATION: 97 %

## 2024-04-23 PROCEDURE — 93298 REM INTERROG DEV EVAL SCRMS: CPT

## 2024-04-23 PROCEDURE — G2211 COMPLEX E/M VISIT ADD ON: CPT

## 2024-04-23 PROCEDURE — 99214 OFFICE O/P EST MOD 30 MIN: CPT

## 2024-04-23 NOTE — REVIEW OF SYSTEMS
[Feeling Poorly] : feeling poorly [Feeling Tired] : feeling tired [Dry Eyes] : dryness of the eyes [Arthralgias] : arthralgias [Joint Pain] : joint pain [Difficulty Walking] : difficulty walking [Sleep Disturbances] : sleep disturbances [Anxiety] : anxiety [Depression] : depression [As Noted in HPI] : as noted in HPI [Hot Flashes] : hot flashes [Negative] : Heme/Lymph [Joint Swelling] : no joint swelling [Joint Stiffness] : no joint stiffness [Suicidal] : not suicidal [FreeTextEntry3] : + tension headaches
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
## 2024-04-23 NOTE — ASSESSMENT
[FreeTextEntry1] : FAISAL MOFFETT is a 58 year old woman with prior dx of FMS which I agree with given sx as above and exam with diffuse soft tissue tender points, skin hypersensitivity, appearing fatigued. Pt also with mild dry eyes and dry mouth chronically, slightly imbalanced gait and with hx of issues with balance and falls. Remains with paucity of inflammatory arthritis or CTD sx. Responsive to low dose prednisone for markedly worse FMS sx but other medical issues making it not an ideal long term med. Did not tolerate trial of Savella. Nerve blocks are helping partially, also now established with psych who is helping to guide medication.   # FMS  - c/w duloxetine 50mg - dosing from psych  - c/w Lyrica - she is using it variably between 1-3x /day which she feels is working well - c/w at least once daily dosing, try for 2 doses - afternoon, evening but taper back if becomes too dizzy  - c/w flexeril but try QHS consistently - will make change in Lyrica first then this to avoid polypharmacy related SE  - c/w low dose prednisone PRN sparingly for severe flares - reports she hasn't had to use - I remain on board with any changes/input from psychiatry as I suspect this is a concomitant problem.  - c/w stretching, light aerobic exercises, massage, heat as adjuncts for FMS pain.  - c/w nonpharmacologic FMS therapies as well - encouraged to incorporate small activities that she finds beneficial during her day, optimize stretching and light exercise, and be mindful of mood, be aware not to overextend on days she has less pain, ensure adequate rest between strenuous activities.  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

## 2024-04-23 NOTE — PHYSICAL EXAM
Patient has given consent to record this visit for documentation in their clinical record.     [General Appearance - Alert] : alert [General Appearance - In No Acute Distress] : in no acute distress [General Appearance - Well Nourished] : well nourished [Sclera] : the sclera and conjunctiva were normal [PERRL With Normal Accommodation] : pupils were equal in size, round, and reactive to light [Extraocular Movements] : extraocular movements were intact [Outer Ear] : the ears and nose were normal in appearance [Neck Appearance] : the appearance of the neck was normal [Auscultation Breath Sounds / Voice Sounds] : lungs were clear to auscultation bilaterally [Heart Rate And Rhythm] : heart rate was normal and rhythm regular [Heart Sounds] : normal S1 and S2 [Edema] : there was no peripheral edema [No CVA Tenderness] : no ~M costovertebral angle tenderness [No Spinal Tenderness] : no spinal tenderness [] : no rash [Motor Exam] : the motor exam was normal [No Focal Deficits] : no focal deficits [Oriented To Time, Place, And Person] : oriented to person, place, and time [Nail Clubbing] : no clubbing  or cyanosis of the fingernails [Musculoskeletal - Swelling] : no joint swelling seen [Motor Tone] : muscle strength and tone were normal [FreeTextEntry1] : No synovitis, effusions, contractures. ROM intact, antalgic gait

## 2024-04-25 ENCOUNTER — APPOINTMENT (OUTPATIENT)
Dept: PSYCHIATRY | Facility: CLINIC | Age: 59
End: 2024-04-25
Payer: COMMERCIAL

## 2024-04-25 DIAGNOSIS — F43.10 POST-TRAUMATIC STRESS DISORDER, UNSPECIFIED: ICD-10-CM

## 2024-04-25 PROCEDURE — 99214 OFFICE O/P EST MOD 30 MIN: CPT

## 2024-04-25 NOTE — REVIEW OF SYSTEMS
NAIL ANATOMY      XRAY   Tetanus  Oral antibiotics  (  See PCP if more pain red change range of motion ( or ER )     [Negative] : Allergic/Immunologic [de-identified] : see interval history.

## 2024-04-25 NOTE — HISTORY OF PRESENT ILLNESS
[FreeTextEntry1] : Pt is worried about relapse of depression. (This has been for a week). Pt is better once she is in the sunshine.  Pt reports that she seems to be "continuing her dreams".  Pt reports she has had kidney stones. (she had microscopic hematuria).  Pt reports she feels better upon resuming Cymbalta, she also reports that she is constipated.  Pt reports she does not absorb things and has a distended abdomen. Pt does not want to be dependent on medication feels that Cymbalta mostly for her is for pain,  Pt reports this is her favorite time of year, and she is starting to garden.

## 2024-04-25 NOTE — DISCUSSION/SUMMARY
[FreeTextEntry1] : Pt reports if she takes Lyrica three times per day, she feels cognitively impaired, she feels the same way on Flexeril.  Pt concerned about her acute on chronic pain and she needs to drive.  She states her mother was in [place].  Despite her symptoms she states she is overall ok.

## 2024-04-25 NOTE — PHYSICAL EXAM
[Feeling Restless] : feeling ~L restless [FreeTextEntry5] : Pt restless and fidgeting in chair.  [Well groomed] : well groomed [Cooperative] : cooperative [Euthymic] : euthymic [Full] : full [Clear] : clear [Linear/Goal Directed] : linear/goal directed [None] : none [None Reported] : none reported [Average] : average [WNL] : within normal limits [FreeTextEntry1] : States fluorescent lights overhead affect migraines (pt denied having a migraine ).

## 2024-04-29 ENCOUNTER — APPOINTMENT (OUTPATIENT)
Dept: FAMILY MEDICINE | Facility: CLINIC | Age: 59
End: 2024-04-29
Payer: COMMERCIAL

## 2024-04-29 VITALS
WEIGHT: 150 LBS | TEMPERATURE: 98.9 F | HEIGHT: 68 IN | SYSTOLIC BLOOD PRESSURE: 101 MMHG | RESPIRATION RATE: 16 BRPM | BODY MASS INDEX: 22.73 KG/M2 | HEART RATE: 88 BPM | OXYGEN SATURATION: 95 % | DIASTOLIC BLOOD PRESSURE: 67 MMHG

## 2024-04-29 DIAGNOSIS — M79.7 FIBROMYALGIA: ICD-10-CM

## 2024-04-29 DIAGNOSIS — E53.8 DEFICIENCY OF OTHER SPECIFIED B GROUP VITAMINS: ICD-10-CM

## 2024-04-29 DIAGNOSIS — R10.9 UNSPECIFIED ABDOMINAL PAIN: ICD-10-CM

## 2024-04-29 PROCEDURE — 99214 OFFICE O/P EST MOD 30 MIN: CPT

## 2024-05-01 PROBLEM — M79.7 FIBROMYALGIA: Status: ACTIVE | Noted: 2020-12-04

## 2024-05-01 PROBLEM — E53.8 FOLATE DEFICIENCY: Status: ACTIVE | Noted: 2024-05-01

## 2024-05-01 PROBLEM — R10.9 FLANK PAIN: Status: ACTIVE | Noted: 2024-04-29

## 2024-05-01 LAB
ALBUMIN SERPL ELPH-MCNC: 4.6 G/DL
ALP BLD-CCNC: 115 U/L
ALT SERPL-CCNC: 12 U/L
ANION GAP SERPL CALC-SCNC: 12 MMOL/L
AST SERPL-CCNC: 14 U/L
BILIRUB SERPL-MCNC: 0.4 MG/DL
BUN SERPL-MCNC: 12 MG/DL
CALCIUM SERPL-MCNC: 9.7 MG/DL
CHLORIDE SERPL-SCNC: 103 MMOL/L
CHOLEST SERPL-MCNC: 199 MG/DL
CO2 SERPL-SCNC: 26 MMOL/L
CREAT SERPL-MCNC: 1 MG/DL
EGFR: 66 ML/MIN/1.73M2
ESTIMATED AVERAGE GLUCOSE: 120 MG/DL
FERRITIN SERPL-MCNC: 49 NG/ML
FOLATE SERPL-MCNC: 3.4 NG/ML
GLUCOSE SERPL-MCNC: 108 MG/DL
HBA1C MFR BLD HPLC: 5.8 %
HCT VFR BLD CALC: 42 %
HDLC SERPL-MCNC: 66 MG/DL
HGB BLD-MCNC: 13.9 G/DL
IRON SATN MFR SERPL: 18 %
IRON SERPL-MCNC: 54 UG/DL
LDLC SERPL CALC-MCNC: 115 MG/DL
MCHC RBC-ENTMCNC: 29.8 PG
MCHC RBC-ENTMCNC: 33.1 GM/DL
MCV RBC AUTO: 89.9 FL
NONHDLC SERPL-MCNC: 133 MG/DL
PLATELET # BLD AUTO: 323 K/UL
POTASSIUM SERPL-SCNC: 4.3 MMOL/L
PROT SERPL-MCNC: 6.9 G/DL
RBC # BLD: 4.67 M/UL
RBC # FLD: 15 %
SODIUM SERPL-SCNC: 142 MMOL/L
TIBC SERPL-MCNC: 307 UG/DL
TRIGL SERPL-MCNC: 99 MG/DL
TSH SERPL-ACNC: 2.79 UIU/ML
UIBC SERPL-MCNC: 253 UG/DL
VIT B12 SERPL-MCNC: 1187 PG/ML
WBC # FLD AUTO: 4.95 K/UL

## 2024-05-01 NOTE — REVIEW OF SYSTEMS
[Dysuria] : no dysuria [Hematuria] : no hematuria [Muscle Pain] : muscle pain [Back Pain] : back pain [Negative] : Respiratory

## 2024-05-01 NOTE — HISTORY OF PRESENT ILLNESS
[de-identified] : 58-year-old female presenting for ongoing management of chronic medical conditions. She has high cholesterol, prediabetes, folate deficiency, and chronic pain. She has been taking Cymbalta for pain management and recently missed a few doses, leading to increased pain and emotional distress. She also experiences recurrent kidney stones and is seeking better pain management options for acute kidney stone episodes.  (down from 122), A1c 5.8%, folate low, B12 WNL after taking gummies, normal blood count.

## 2024-05-02 ENCOUNTER — NON-APPOINTMENT (OUTPATIENT)
Age: 59
End: 2024-05-02

## 2024-05-02 ENCOUNTER — APPOINTMENT (OUTPATIENT)
Dept: CARDIOLOGY | Facility: CLINIC | Age: 59
End: 2024-05-02
Payer: COMMERCIAL

## 2024-05-02 PROCEDURE — 93306 TTE W/DOPPLER COMPLETE: CPT

## 2024-05-28 ENCOUNTER — NON-APPOINTMENT (OUTPATIENT)
Age: 59
End: 2024-05-28

## 2024-05-28 ENCOUNTER — APPOINTMENT (OUTPATIENT)
Dept: ELECTROPHYSIOLOGY | Facility: CLINIC | Age: 59
End: 2024-05-28
Payer: COMMERCIAL

## 2024-05-28 PROCEDURE — 93298 REM INTERROG DEV EVAL SCRMS: CPT

## 2024-06-03 ENCOUNTER — APPOINTMENT (OUTPATIENT)
Dept: PSYCHIATRY | Facility: CLINIC | Age: 59
End: 2024-06-03
Payer: COMMERCIAL

## 2024-06-03 DIAGNOSIS — F33.1 MAJOR DEPRESSIVE DISORDER, RECURRENT, MODERATE: ICD-10-CM

## 2024-06-03 DIAGNOSIS — G47.00 INSOMNIA, UNSPECIFIED: ICD-10-CM

## 2024-06-03 PROCEDURE — 99214 OFFICE O/P EST MOD 30 MIN: CPT

## 2024-06-03 RX ORDER — DULOXETINE HYDROCHLORIDE 20 MG/1
20 CAPSULE, DELAYED RELEASE PELLETS ORAL
Qty: 30 | Refills: 2 | Status: ACTIVE | COMMUNITY
Start: 2023-08-24 | End: 1900-01-01

## 2024-06-03 RX ORDER — DULOXETINE HYDROCHLORIDE 30 MG/1
30 CAPSULE, DELAYED RELEASE PELLETS ORAL DAILY
Qty: 30 | Refills: 2 | Status: ACTIVE | COMMUNITY
Start: 2023-08-24 | End: 1900-01-01

## 2024-06-03 NOTE — PHYSICAL EXAM
[Feeling Restless] : feeling ~L restless [Well groomed] : well groomed [Cooperative] : cooperative [Euthymic] : euthymic [Full] : full [Clear] : clear [Linear/Goal Directed] : linear/goal directed [None] : none [None Reported] : none reported [Average] : average [WNL] : within normal limits [FreeTextEntry5] : Pt restless and fidgeting in chair.

## 2024-06-03 NOTE — HISTORY OF PRESENT ILLNESS
[FreeTextEntry1] : Pt has 2 daughters and went with her to [place].  Pt reports that her spouse is their stepfather.  Discussed conflict around not having a visible cause for illness.  Pt reports she does attempt to exercise, and feels she overcompensates on a good day.  Overall is at her baseline. Pt is on 50 mg of Duloxetine. (not available in a 50 mg capsule, has to take both a 20 mg and a 30 mg).  Pt reports no weight changes, likes to garden, still has chronic fatigue, reports she is sleeping, will wake, but then falls back to sleep.  Pain is "not the greatest" but does not want to increase any of her medication. No skipped beats, pt reports she will look to seeing a neurologist for her migraines, does not want to have Vistaril refilled, will take it rarely prn .

## 2024-06-03 NOTE — PLAN
[No] : No [Medication education provided] : Medication education provided. [Rationale for medication choices, possible risks/precautions, benefits, alternative treatment choices, and consequences of non-treatment discussed] : Rationale for medication choices, possible risks/precautions, benefits, alternative treatment choices, and consequences of non-treatment discussed with patient/family/caregiver  [FreeTextEntry4] : Meeting plan of care.  Pt reports her HgA1c was elevated (she had peeps for easter).  [FreeTextEntry5] : Pt is doing well and will monitor response.  Clinically stable, benefits from this level of care to maintain gains made.  RTC in 2 months.

## 2024-06-12 ENCOUNTER — APPOINTMENT (OUTPATIENT)
Dept: GASTROENTEROLOGY | Facility: CLINIC | Age: 59
End: 2024-06-12
Payer: COMMERCIAL

## 2024-06-12 VITALS
SYSTOLIC BLOOD PRESSURE: 112 MMHG | HEIGHT: 68 IN | TEMPERATURE: 98 F | WEIGHT: 150 LBS | OXYGEN SATURATION: 95 % | DIASTOLIC BLOOD PRESSURE: 78 MMHG | BODY MASS INDEX: 22.73 KG/M2 | HEART RATE: 86 BPM | RESPIRATION RATE: 16 BRPM

## 2024-06-12 DIAGNOSIS — Z12.11 ENCOUNTER FOR SCREENING FOR MALIGNANT NEOPLASM OF COLON: ICD-10-CM

## 2024-06-12 DIAGNOSIS — Z80.0 FAMILY HISTORY OF MALIGNANT NEOPLASM OF DIGESTIVE ORGANS: ICD-10-CM

## 2024-06-12 DIAGNOSIS — Z80.0 ENCOUNTER FOR SCREENING FOR MALIGNANT NEOPLASM OF COLON: ICD-10-CM

## 2024-06-12 PROCEDURE — 99214 OFFICE O/P EST MOD 30 MIN: CPT

## 2024-06-12 RX ORDER — ASPIRIN 81 MG
81 TABLET, DELAYED RELEASE (ENTERIC COATED) ORAL
Refills: 0 | Status: COMPLETED | COMMUNITY
End: 2024-06-12

## 2024-06-12 RX ORDER — SODIUM SULFATE, POTASSIUM SULFATE AND MAGNESIUM SULFATE 1.6; 3.13; 17.5 G/177ML; G/177ML; G/177ML
17.5-3.13-1.6 SOLUTION ORAL
Qty: 1 | Refills: 0 | Status: COMPLETED | COMMUNITY
Start: 2024-06-12 | End: 2024-06-13

## 2024-06-12 NOTE — REVIEW OF SYSTEMS
[Chest Pain] : chest pain [As Noted in HPI] : as noted in HPI [Constipation] : constipation [Negative] : Cardiovascular [Vomiting] : no vomiting [Melena (black stool)] : no melena

## 2024-06-12 NOTE — HISTORY OF PRESENT ILLNESS
[FreeTextEntry1] : Wants to have colonoscopy due to mother recently diagnosed with colon cancer age 87, had surgery. Reports constipation, gets colon hydrotherapy. Denies rectal bleeding, melena.  Reports after her last colonoscopy w/Dr. Butler she was very distended and needed a rectal tube.  Has not had any imaging for defecation issues. Was advised by Dr. Correia (colorectal surgeon) to do pelvic floor therapy with biofeedback but she did not do this.

## 2024-06-12 NOTE — ASSESSMENT
[FreeTextEntry1] : Get colonoscopy.  Take Miralax daily for 14 days prior to the test.  Suprep split dose.  Explained the risks, benefits, indications, alternatives. The risks include but are not limited to bleeding, infection, perforation, reaction to anesthesia, or missed lesions. The patient verbalized understanding and wishes to proceed.  Consider MR defecography evaluation depending on results of colonoscopy.

## 2024-06-12 NOTE — PHYSICAL EXAM
[Alert] : alert [Normal Voice/Communication] : normal voice/communication [Healthy Appearing] : healthy appearing [No Acute Distress] : no acute distress [Sclera] : the sclera and conjunctiva were normal [Hearing Threshold Finger Rub Not [place]] : hearing was normal [Normal Lips/Gums] : the lips and gums were normal [Oropharynx] : the oropharynx was normal [Normal Appearance] : the appearance of the neck was normal [No Neck Mass] : no neck mass was observed [No Respiratory Distress] : no respiratory distress [No Acc Muscle Use] : no accessory muscle use [Respiration, Rhythm And Depth] : normal respiratory rhythm and effort [Auscultation Breath Sounds / Voice Sounds] : lungs were clear to auscultation bilaterally [Heart Rate And Rhythm] : heart rate was normal and rhythm regular [Normal S1, S2] : normal S1 and S2 [Murmurs] : no murmurs [Bowel Sounds] : normal bowel sounds [Abdomen Tenderness] : non-tender [No Masses] : no abdominal mass palpated [Abdomen Soft] : soft [No CVA Tenderness] : no CVA  tenderness [No Clubbing, Cyanosis] : no clubbing or cyanosis of the fingernails [] : no rash [No Focal Deficits] : no focal deficits [Oriented To Time, Place, And Person] : oriented to person, place, and time

## 2024-07-01 ENCOUNTER — APPOINTMENT (OUTPATIENT)
Dept: ELECTROPHYSIOLOGY | Facility: CLINIC | Age: 59
End: 2024-07-01
Payer: COMMERCIAL

## 2024-07-01 ENCOUNTER — NON-APPOINTMENT (OUTPATIENT)
Age: 59
End: 2024-07-01

## 2024-07-01 PROCEDURE — 93298 REM INTERROG DEV EVAL SCRMS: CPT

## 2024-07-11 ENCOUNTER — APPOINTMENT (OUTPATIENT)
Dept: PAIN MANAGEMENT | Facility: CLINIC | Age: 59
End: 2024-07-11
Payer: COMMERCIAL

## 2024-07-11 VITALS
HEART RATE: 88 BPM | WEIGHT: 150 LBS | HEIGHT: 68 IN | DIASTOLIC BLOOD PRESSURE: 76 MMHG | BODY MASS INDEX: 22.73 KG/M2 | SYSTOLIC BLOOD PRESSURE: 107 MMHG

## 2024-07-11 VITALS — DIASTOLIC BLOOD PRESSURE: 79 MMHG | HEART RATE: 102 BPM | SYSTOLIC BLOOD PRESSURE: 109 MMHG

## 2024-07-11 DIAGNOSIS — R26.81 UNSTEADINESS ON FEET: ICD-10-CM

## 2024-07-11 PROCEDURE — 99205 OFFICE O/P NEW HI 60 MIN: CPT

## 2024-07-24 ENCOUNTER — NON-APPOINTMENT (OUTPATIENT)
Age: 59
End: 2024-07-24

## 2024-07-25 ENCOUNTER — NON-APPOINTMENT (OUTPATIENT)
Age: 59
End: 2024-07-25

## 2024-07-29 RX ORDER — NALTREXONE 100 %
POWDER (GRAM) MISCELLANEOUS
Qty: 90 | Refills: 3 | Status: ACTIVE | COMMUNITY
Start: 2024-07-29 | End: 1900-01-01

## 2024-08-01 ENCOUNTER — APPOINTMENT (OUTPATIENT)
Dept: CARDIOLOGY | Facility: CLINIC | Age: 59
End: 2024-08-01

## 2024-08-02 ENCOUNTER — OUTPATIENT (OUTPATIENT)
Dept: OUTPATIENT SERVICES | Facility: HOSPITAL | Age: 59
LOS: 1 days | End: 2024-08-02
Payer: COMMERCIAL

## 2024-08-02 ENCOUNTER — APPOINTMENT (OUTPATIENT)
Dept: GASTROENTEROLOGY | Facility: HOSPITAL | Age: 59
End: 2024-08-02

## 2024-08-02 DIAGNOSIS — Z98.890 OTHER SPECIFIED POSTPROCEDURAL STATES: Chronic | ICD-10-CM

## 2024-08-02 DIAGNOSIS — E89.0 POSTPROCEDURAL HYPOTHYROIDISM: Chronic | ICD-10-CM

## 2024-08-02 DIAGNOSIS — Z80.0 FAMILY HISTORY OF MALIGNANT NEOPLASM OF DIGESTIVE ORGANS: ICD-10-CM

## 2024-08-02 PROCEDURE — 45378 DIAGNOSTIC COLONOSCOPY: CPT

## 2024-08-02 PROCEDURE — G0105: CPT

## 2024-08-02 RX ORDER — BACTERIOSTATIC SODIUM CHLORIDE 0.9 %
500 VIAL (ML) INJECTION
Refills: 0 | Status: ACTIVE | OUTPATIENT
Start: 2024-08-02

## 2024-08-05 ENCOUNTER — NON-APPOINTMENT (OUTPATIENT)
Age: 59
End: 2024-08-05

## 2024-08-05 ENCOUNTER — APPOINTMENT (OUTPATIENT)
Dept: ELECTROPHYSIOLOGY | Facility: CLINIC | Age: 59
End: 2024-08-05

## 2024-08-05 PROCEDURE — 93298 REM INTERROG DEV EVAL SCRMS: CPT

## 2024-08-06 ENCOUNTER — APPOINTMENT (OUTPATIENT)
Dept: RHEUMATOLOGY | Facility: CLINIC | Age: 59
End: 2024-08-06

## 2024-08-06 PROCEDURE — 99214 OFFICE O/P EST MOD 30 MIN: CPT

## 2024-08-06 PROCEDURE — G2211 COMPLEX E/M VISIT ADD ON: CPT

## 2024-08-07 NOTE — ASSESSMENT
[FreeTextEntry1] : FAISAL MOFFETT is a 58 year old woman with prior dx of FMS which I agree with given sx as above and exam with diffuse soft tissue tender points, skin hypersensitivity, appearing fatigued. Pt also with mild dry eyes and dry mouth chronically, slightly imbalanced gait and with hx of issues with balance and falls. Remains with paucity of inflammatory arthritis or CTD sx. Responsive to low dose prednisone for markedly worse FMS sx but other medical issues making it not an ideal long term med. Did not tolerate trial of Savella. Nerve blocks are helping partially, also now established with psych who is helping to guide medication.   # FMS  - c/w duloxetine 50mg - dosing from psych  - c/w Lyrica - she is using it variably between 1-3x /day which she feels is working well - c/w at least once daily dosing, continue to try for 2 doses - afternoon, evening but taper back if becomes too dizzy  - c/w flexeril QHS  - agree with trial of LDN - c/w low dose prednisone PRN sparingly severe flares - reports she hasn't had to use, even during recent episode  - I remain on board with any changes/input from psychiatry as I suspect this is a concomitant problem.  - c/w stretching, light aerobic exercises, massage, heat as adjuncts for FMS pain.  - c/w nonpharmacologic FMS therapies as well - encouraged to incorporate small activities that she finds beneficial during her day, optimize stretching and light exercise, and be mindful of mood, be aware not to overextend on days she has less pain, ensure adequate rest between strenuous activities.  

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
## 2024-08-07 NOTE — REVIEW OF SYSTEMS
[Feeling Poorly] : feeling poorly [Feeling Tired] : feeling tired [Dry Eyes] : dryness of the eyes [Arthralgias] : arthralgias [Joint Pain] : joint pain [Difficulty Walking] : difficulty walking [Sleep Disturbances] : sleep disturbances [Anxiety] : anxiety [Depression] : depression [As Noted in HPI] : as noted in HPI [Hot Flashes] : hot flashes [Negative] : Endocrine [Fever] : no fever [Chills] : no chills [Joint Swelling] : no joint swelling [Joint Stiffness] : no joint stiffness [Suicidal] : not suicidal [FreeTextEntry3] : + tension headaches

## 2024-09-09 ENCOUNTER — NON-APPOINTMENT (OUTPATIENT)
Age: 59
End: 2024-09-09

## 2024-09-09 ENCOUNTER — APPOINTMENT (OUTPATIENT)
Dept: ELECTROPHYSIOLOGY | Facility: CLINIC | Age: 59
End: 2024-09-09
Payer: COMMERCIAL

## 2024-09-09 PROCEDURE — 93298 REM INTERROG DEV EVAL SCRMS: CPT

## 2024-09-22 ENCOUNTER — EMERGENCY (EMERGENCY)
Facility: HOSPITAL | Age: 59
LOS: 1 days | Discharge: HOME | End: 2024-09-22
Attending: EMERGENCY MEDICINE | Admitting: EMERGENCY MEDICINE
Payer: COMMERCIAL

## 2024-09-22 VITALS
HEIGHT: 64 IN | DIASTOLIC BLOOD PRESSURE: 87 MMHG | SYSTOLIC BLOOD PRESSURE: 127 MMHG | HEART RATE: 82 BPM | TEMPERATURE: 98 F | OXYGEN SATURATION: 100 % | WEIGHT: 149.91 LBS | RESPIRATION RATE: 19 BRPM

## 2024-09-22 DIAGNOSIS — Z98.890 OTHER SPECIFIED POSTPROCEDURAL STATES: Chronic | ICD-10-CM

## 2024-09-22 DIAGNOSIS — E89.0 POSTPROCEDURAL HYPOTHYROIDISM: Chronic | ICD-10-CM

## 2024-09-22 LAB
ALBUMIN SERPL ELPH-MCNC: 3.6 G/DL — SIGNIFICANT CHANGE UP (ref 3.3–5)
ALP SERPL-CCNC: 100 U/L — SIGNIFICANT CHANGE UP (ref 40–120)
ALT FLD-CCNC: 21 U/L — SIGNIFICANT CHANGE UP (ref 10–45)
ANION GAP SERPL CALC-SCNC: 8 MMOL/L — SIGNIFICANT CHANGE UP (ref 5–17)
APPEARANCE UR: CLEAR — SIGNIFICANT CHANGE UP
AST SERPL-CCNC: 20 U/L — SIGNIFICANT CHANGE UP (ref 10–40)
BACTERIA # UR AUTO: NEGATIVE /HPF — SIGNIFICANT CHANGE UP
BASOPHILS # BLD AUTO: 0.02 K/UL — SIGNIFICANT CHANGE UP (ref 0–0.2)
BASOPHILS NFR BLD AUTO: 0.2 % — SIGNIFICANT CHANGE UP (ref 0–2)
BILIRUB SERPL-MCNC: 0.4 MG/DL — SIGNIFICANT CHANGE UP (ref 0.2–1.2)
BILIRUB UR-MCNC: NEGATIVE — SIGNIFICANT CHANGE UP
BUN SERPL-MCNC: 16 MG/DL — SIGNIFICANT CHANGE UP (ref 7–23)
CALCIUM SERPL-MCNC: 9.2 MG/DL — SIGNIFICANT CHANGE UP (ref 8.4–10.5)
CHLORIDE SERPL-SCNC: 101 MMOL/L — SIGNIFICANT CHANGE UP (ref 96–108)
CO2 SERPL-SCNC: 29 MMOL/L — SIGNIFICANT CHANGE UP (ref 22–31)
COLOR SPEC: YELLOW — SIGNIFICANT CHANGE UP
CREAT SERPL-MCNC: 1 MG/DL — SIGNIFICANT CHANGE UP (ref 0.5–1.3)
DIFF PNL FLD: ABNORMAL
EGFR: 66 ML/MIN/1.73M2 — SIGNIFICANT CHANGE UP
EOSINOPHIL # BLD AUTO: 0.12 K/UL — SIGNIFICANT CHANGE UP (ref 0–0.5)
EOSINOPHIL NFR BLD AUTO: 1.5 % — SIGNIFICANT CHANGE UP (ref 0–6)
EPI CELLS # UR: 0 — SIGNIFICANT CHANGE UP
GLUCOSE SERPL-MCNC: 141 MG/DL — HIGH (ref 70–99)
GLUCOSE UR QL: NEGATIVE MG/DL — SIGNIFICANT CHANGE UP
HCT VFR BLD CALC: 36.3 % — SIGNIFICANT CHANGE UP (ref 34.5–45)
HGB BLD-MCNC: 12.1 G/DL — SIGNIFICANT CHANGE UP (ref 11.5–15.5)
IMM GRANULOCYTES NFR BLD AUTO: 0.1 % — SIGNIFICANT CHANGE UP (ref 0–0.9)
KETONES UR-MCNC: NEGATIVE MG/DL — SIGNIFICANT CHANGE UP
LEUKOCYTE ESTERASE UR-ACNC: NEGATIVE — SIGNIFICANT CHANGE UP
LIDOCAIN IGE QN: 56 U/L — SIGNIFICANT CHANGE UP (ref 16–77)
LYMPHOCYTES # BLD AUTO: 2.22 K/UL — SIGNIFICANT CHANGE UP (ref 1–3.3)
LYMPHOCYTES # BLD AUTO: 27.3 % — SIGNIFICANT CHANGE UP (ref 13–44)
MCHC RBC-ENTMCNC: 30.5 PG — SIGNIFICANT CHANGE UP (ref 27–34)
MCHC RBC-ENTMCNC: 33.3 GM/DL — SIGNIFICANT CHANGE UP (ref 32–36)
MCV RBC AUTO: 91.4 FL — SIGNIFICANT CHANGE UP (ref 80–100)
MONOCYTES # BLD AUTO: 0.69 K/UL — SIGNIFICANT CHANGE UP (ref 0–0.9)
MONOCYTES NFR BLD AUTO: 8.5 % — SIGNIFICANT CHANGE UP (ref 2–14)
NEUTROPHILS # BLD AUTO: 5.08 K/UL — SIGNIFICANT CHANGE UP (ref 1.8–7.4)
NEUTROPHILS NFR BLD AUTO: 62.4 % — SIGNIFICANT CHANGE UP (ref 43–77)
NITRITE UR-MCNC: NEGATIVE — SIGNIFICANT CHANGE UP
NRBC # BLD: 0 /100 WBCS — SIGNIFICANT CHANGE UP (ref 0–0)
PH UR: 6.5 — SIGNIFICANT CHANGE UP (ref 5–8)
PLATELET # BLD AUTO: 230 K/UL — SIGNIFICANT CHANGE UP (ref 150–400)
POTASSIUM SERPL-MCNC: 3.9 MMOL/L — SIGNIFICANT CHANGE UP (ref 3.5–5.3)
POTASSIUM SERPL-SCNC: 3.9 MMOL/L — SIGNIFICANT CHANGE UP (ref 3.5–5.3)
PROT SERPL-MCNC: 6.8 G/DL — SIGNIFICANT CHANGE UP (ref 6–8.3)
PROT UR-MCNC: NEGATIVE MG/DL — SIGNIFICANT CHANGE UP
RBC # BLD: 3.97 M/UL — SIGNIFICANT CHANGE UP (ref 3.8–5.2)
RBC # FLD: 13.8 % — SIGNIFICANT CHANGE UP (ref 10.3–14.5)
RBC CASTS # UR COMP ASSIST: 1 /HPF — SIGNIFICANT CHANGE UP (ref 0–4)
SODIUM SERPL-SCNC: 138 MMOL/L — SIGNIFICANT CHANGE UP (ref 135–145)
SP GR SPEC: 1 — SIGNIFICANT CHANGE UP (ref 1–1.03)
UROBILINOGEN FLD QL: 0.2 MG/DL — SIGNIFICANT CHANGE UP (ref 0.2–1)
WBC # BLD: 8.14 K/UL — SIGNIFICANT CHANGE UP (ref 3.8–10.5)
WBC # FLD AUTO: 8.14 K/UL — SIGNIFICANT CHANGE UP (ref 3.8–10.5)
WBC UR QL: 0 /HPF — SIGNIFICANT CHANGE UP (ref 0–5)

## 2024-09-22 PROCEDURE — 96375 TX/PRO/DX INJ NEW DRUG ADDON: CPT

## 2024-09-22 PROCEDURE — 74176 CT ABD & PELVIS W/O CONTRAST: CPT | Mod: MC

## 2024-09-22 PROCEDURE — 85025 COMPLETE CBC W/AUTO DIFF WBC: CPT

## 2024-09-22 PROCEDURE — 36415 COLL VENOUS BLD VENIPUNCTURE: CPT

## 2024-09-22 PROCEDURE — 96374 THER/PROPH/DIAG INJ IV PUSH: CPT

## 2024-09-22 PROCEDURE — 80053 COMPREHEN METABOLIC PANEL: CPT

## 2024-09-22 PROCEDURE — 74176 CT ABD & PELVIS W/O CONTRAST: CPT | Mod: 26,MC

## 2024-09-22 PROCEDURE — 99284 EMERGENCY DEPT VISIT MOD MDM: CPT

## 2024-09-22 PROCEDURE — 99284 EMERGENCY DEPT VISIT MOD MDM: CPT | Mod: 25

## 2024-09-22 PROCEDURE — 83690 ASSAY OF LIPASE: CPT

## 2024-09-22 PROCEDURE — 96361 HYDRATE IV INFUSION ADD-ON: CPT

## 2024-09-22 PROCEDURE — 81001 URINALYSIS AUTO W/SCOPE: CPT

## 2024-09-22 RX ORDER — OXYCODONE AND ACETAMINOPHEN 7.5; 325 MG/1; MG/1
1 TABLET ORAL
Qty: 16 | Refills: 0
Start: 2024-09-22 | End: 2024-09-25

## 2024-09-22 RX ORDER — LIDOCAINE/BENZALKONIUM/ALCOHOL
1 SOLUTION, NON-ORAL TOPICAL
Qty: 3 | Refills: 0
Start: 2024-09-22 | End: 2024-09-26

## 2024-09-22 RX ORDER — SODIUM CHLORIDE 9 MG/ML
1000 INJECTION INTRAMUSCULAR; INTRAVENOUS; SUBCUTANEOUS ONCE
Refills: 0 | Status: COMPLETED | OUTPATIENT
Start: 2024-09-22 | End: 2024-09-22

## 2024-09-22 RX ORDER — ONDANSETRON 2 MG/ML
4 INJECTION, SOLUTION INTRAMUSCULAR; INTRAVENOUS ONCE
Refills: 0 | Status: ACTIVE | OUTPATIENT
Start: 2024-09-22

## 2024-09-22 RX ORDER — KETOROLAC TROMETHAMINE 30 MG/ML
30 INJECTION, SOLUTION INTRAMUSCULAR ONCE
Refills: 0 | Status: DISCONTINUED | OUTPATIENT
Start: 2024-09-22 | End: 2024-09-22

## 2024-09-22 RX ADMIN — SODIUM CHLORIDE 1000 MILLILITER(S): 9 INJECTION INTRAMUSCULAR; INTRAVENOUS; SUBCUTANEOUS at 17:24

## 2024-09-22 RX ADMIN — KETOROLAC TROMETHAMINE 30 MILLIGRAM(S): 30 INJECTION, SOLUTION INTRAMUSCULAR at 17:20

## 2024-09-22 RX ADMIN — SODIUM CHLORIDE 1000 MILLILITER(S): 9 INJECTION INTRAMUSCULAR; INTRAVENOUS; SUBCUTANEOUS at 18:00

## 2024-09-22 RX ADMIN — Medication 4 MILLIGRAM(S): at 17:50

## 2024-09-22 RX ADMIN — KETOROLAC TROMETHAMINE 30 MILLIGRAM(S): 30 INJECTION, SOLUTION INTRAMUSCULAR at 17:50

## 2024-09-22 RX ADMIN — Medication 4 MILLIGRAM(S): at 17:20

## 2024-09-22 NOTE — ED ADULT NURSE NOTE - OBJECTIVE STATEMENT
Patient presents to ED complaining of back pain, patient denies injury or trauma. Patient states she has had kidney stones in the past and thinks this is a kidney stone. Patient 10/10 pain when pain I strong. Patient ax0x4, patient ambulates without assistance.

## 2024-09-22 NOTE — ED ADULT NURSE NOTE - NSICDXPASTMEDICALHX_GEN_ALL_CORE_FT
PAST MEDICAL HISTORY:  Anxiety     Depression     Fibromyalgia     History of celiac disease     History of gait disorder     Hypothyroidism     Lacunar infarction     Thyroid cancer

## 2024-09-22 NOTE — ED PROVIDER NOTE - CARE PLAN
Ledy Shaw Patient Age: 72 year old  MESSAGE:   Caller: Patient    Reason for Call: Scheduling/Appointment Issue-  Same Day Cancellation - DO NOT USE FOR NEUROPSYCH APPTS     Patient cancelled today's appointment due to: Patient was not feeling well.      This appointment was cancelled? YES    Was the Late Cancel/No Show Reason Used: NO    Did patient reschedule the appointment? Patient did not wish to reschedule at this time.     Route message as Routine priority to Provider and the PSR Pool for the site Provider is working at today.    Close message unless Provider input is needed.    Provider Is: In the Office Today       WEIGHT AND HEIGHT:   Wt Readings from Last 1 Encounters:   11/19/21 78.9 kg (174 lb)     Ht Readings from Last 1 Encounters:   11/19/21 5' 7\" (1.702 m)     BMI Readings from Last 1 Encounters:   11/19/21 27.25 kg/m²       ALLERGIES:  Patient has no known allergies.  Current Outpatient Medications   Medication   • Synthroid 112 MCG tablet   • DULoxetine (CYMBALTA) 60 MG capsule   • aspirin (ECOTRIN) 81 MG EC tablet   • ALPRAZolam (XANAX) 0.25 MG tablet   • metoPROLOL succinate (TOPROL-XL) 25 MG 24 hr tablet   • diclofenac (VOLTAREN) 75 MG EC tablet   • DULoxetine (Cymbalta) 30 MG capsule   • alendronate (Fosamax) 70 MG tablet   • Omega-3 Fatty Acids (FISH OIL) 1000 MG capsule   • Cholecalciferol (VITAMIN D-3) 1000 units Cap   • acetaminophen (TYLENOL) 650 MG CR tablet   • calcium carbonate-vitamin D (CALCIUM 600 + D) 600-400 MG-UNIT per tablet   • melatonin 5 MG     No current facility-administered medications for this visit.           PCP: Heavenly Oseguera DO         INS: Payor:  [employer] / Plan:  BE HI [id number] / Product Type: [facility]   PATIENT ADDRESS:  [address] [place] [address] [place] [address]   1 Principal Discharge DX:	Flank pain

## 2024-09-22 NOTE — ED PROVIDER NOTE - OBJECTIVE STATEMENT
59-year-old female presents to the emergency department complaining of left mid back pain.  Patient states no trauma.  Started in the middle of the night.  No relief with heating pad or ice.  Tried cyclobenzaprine and topical pain relief ointment as patient states she has chronic back pain and receives multiple epidurals and medications for chronic back pain however patient states this back pain feels different than what she is accustomed to having in the past.  No fever or chills.  No rash.  No heavy lifting.  No bowel or bladder incontinence.  No vomiting although there is nausea.  No cough chest pain or shortness of breath.

## 2024-09-22 NOTE — ED PROVIDER NOTE - CLINICAL SUMMARY MEDICAL DECISION MAKING FREE TEXT BOX
59-year-old female presents to the emergency department complaining of left mid back pain.  Patient states no trauma.  Started in the middle of the night.  No relief with heating pad or ice.  Tried cyclobenzaprine and topical pain relief ointment as patient states she has chronic back pain and receives multiple epidurals and medications for chronic back pain however patient states this back pain feels different than what she is accustomed to having in the past.  No fever or chills.  No rash.  No heavy lifting.  No bowel or bladder incontinence.  No vomiting although there is nausea.  No cough chest pain or shortness of breath.  Exam as stated. Plan for pain control, labs/urine/ ct renal stone hunt. Pt states + h/o stones in the past. 59-year-old female presents to the emergency department complaining of left mid back pain.  Patient states no trauma.  Started in the middle of the night.  No relief with heating pad or ice.  Tried cyclobenzaprine and topical pain relief ointment as patient states she has chronic back pain and receives multiple epidurals and medications for chronic back pain however patient states this back pain feels different than what she is accustomed to having in the past.  No fever or chills.  No rash.  No heavy lifting.  No bowel or bladder incontinence.  No vomiting although there is nausea.  No cough chest pain or shortness of breath.  Exam as stated. Plan for pain control, labs/urine/ ct renal stone hunt. Pt states + h/o stones in the past.    CT negative.  Consider musculoskeletal etiology.  Patient states pain medication has assisted her.  Advised to follow-up with her primary doctor.  She sees a rheumatologist and a chiropractor who has someone come into the office to give the injections for nerve pain.  Advised to continue her cyclobenzaprine at home.  Will send Percocet.  Patient stable for discharge pending UA.

## 2024-09-22 NOTE — ED ADULT NURSE NOTE - NS ED NURSE LEVEL OF CONSCIOUSNESS AFFECT
"Occupational Therapy     07/20/23 1500   Group Therapy Session   Group Attendance attended group session   Time Session Began 1315   Time Session Ended 1500   Total Time (minutes) 30   Total # Attendees 4   Group Type recreation   Group Topic Covered cognitive activities;coping skills/lifestyle management;leisure exploration/use of leisure time;structured socialization   Group Session Detail OT: Education on healthy leisure engagement and interactive social activity with a cognitive component (Garbage) to increase concentration, focus, attention to task/detail, memory recall, coping with stress, healthy distraction engagement, symptom management, healthy leisure engagement, social wellness, and cognitive wellness   Patient Response/Contribution confused;cooperative with task   Patient Participation Detail Pt sat among peers to complete novel social activity to support cognitive wellness but did not engage in social interactions with peers. Pt initially struggled to follow verbal directions and visual demonstration for activity, but with repetition was able to intermittently engage in the activity independently. Pt required occasional verbal and visual prompts from therapist. Pt reported he needed to \"lay down\"; pt left group and did not return. 2:1 SIO present for duration.        " Calm

## 2024-09-22 NOTE — ED PROVIDER NOTE - PATIENT PORTAL LINK FT
You can access the FollowMyHealth Patient Portal offered by [facility] by registering at the following website: [email or web address]. By joining [facility]’s FollowMyHealth portal, you will also be able to view your health information using other applications (apps) compatible with our system.

## 2024-09-22 NOTE — ED ADULT NURSE NOTE - NSFALLUNIVINTERV_ED_ALL_ED
Bed/Stretcher in lowest position, wheels locked, appropriate side rails in place/Call bell, personal items and telephone in reach/Instruct patient to call for assistance before getting out of bed/chair/stretcher/Non-slip footwear applied when patient is off stretcher/[place] to call system/Physically safe environment - no spills, clutter or unnecessary equipment/Purposeful proactive rounding/Room/bathroom lighting operational, light cord in reach

## 2024-09-22 NOTE — ED PROVIDER NOTE - PHYSICAL EXAMINATION
Vitals: I have reviewed the patients vital signs  General: nontoxic appearing  HEENT: Atraumatic, normocephalic, airway patent  Eyes: EOMI, tracking appropriately  Neck: no tracheal deviation  Chest/Lungs: no trauma, symmetric chest rise, speaking in complete sentences,  no resp distress  Heart: skin and extremities well perfused, regular rate and rhythm  Abd: soft NT ND; + Left CVA tenderness. No bruising or rash to area.   Neuro: A+Ox3, ambulating without difficulty, appears non focal  MSK: strength at baseline in all extremities, no muscle wasting or atrophy  Skin: no cyanosis, no jaundice

## 2024-09-23 ENCOUNTER — APPOINTMENT (OUTPATIENT)
Dept: PSYCHIATRY | Facility: CLINIC | Age: 59
End: 2024-09-23
Payer: COMMERCIAL

## 2024-09-23 ENCOUNTER — EMERGENCY (EMERGENCY)
Facility: HOSPITAL | Age: 59
LOS: 1 days | Discharge: HOME | End: 2024-09-23
Attending: EMERGENCY MEDICINE | Admitting: EMERGENCY MEDICINE
Payer: COMMERCIAL

## 2024-09-23 VITALS
SYSTOLIC BLOOD PRESSURE: 116 MMHG | HEART RATE: 62 BPM | RESPIRATION RATE: 16 BRPM | OXYGEN SATURATION: 97 % | DIASTOLIC BLOOD PRESSURE: 77 MMHG | TEMPERATURE: 99 F

## 2024-09-23 VITALS
TEMPERATURE: 98 F | HEIGHT: 64 IN | RESPIRATION RATE: 16 BRPM | SYSTOLIC BLOOD PRESSURE: 151 MMHG | HEART RATE: 62 BPM | OXYGEN SATURATION: 97 % | DIASTOLIC BLOOD PRESSURE: 96 MMHG | WEIGHT: 149.91 LBS

## 2024-09-23 DIAGNOSIS — Z98.890 OTHER SPECIFIED POSTPROCEDURAL STATES: Chronic | ICD-10-CM

## 2024-09-23 DIAGNOSIS — F33.1 MAJOR DEPRESSIVE DISORDER, RECURRENT, MODERATE: ICD-10-CM

## 2024-09-23 DIAGNOSIS — F43.10 POST-TRAUMATIC STRESS DISORDER, UNSPECIFIED: ICD-10-CM

## 2024-09-23 DIAGNOSIS — E89.0 POSTPROCEDURAL HYPOTHYROIDISM: Chronic | ICD-10-CM

## 2024-09-23 LAB
ALBUMIN SERPL ELPH-MCNC: 3.5 G/DL — SIGNIFICANT CHANGE UP (ref 3.3–5)
ALP SERPL-CCNC: 101 U/L — SIGNIFICANT CHANGE UP (ref 40–120)
ALT FLD-CCNC: 20 U/L — SIGNIFICANT CHANGE UP (ref 10–45)
ANION GAP SERPL CALC-SCNC: 6 MMOL/L — SIGNIFICANT CHANGE UP (ref 5–17)
APPEARANCE UR: CLEAR — SIGNIFICANT CHANGE UP
APTT BLD: 47.8 SEC — HIGH (ref 24.5–35.6)
AST SERPL-CCNC: 20 U/L — SIGNIFICANT CHANGE UP (ref 10–40)
BASOPHILS # BLD AUTO: 0.03 K/UL — SIGNIFICANT CHANGE UP (ref 0–0.2)
BASOPHILS NFR BLD AUTO: 0.5 % — SIGNIFICANT CHANGE UP (ref 0–2)
BILIRUB SERPL-MCNC: 0.7 MG/DL — SIGNIFICANT CHANGE UP (ref 0.2–1.2)
BILIRUB UR-MCNC: NEGATIVE — SIGNIFICANT CHANGE UP
BUN SERPL-MCNC: 10 MG/DL — SIGNIFICANT CHANGE UP (ref 7–23)
CALCIUM SERPL-MCNC: 9.3 MG/DL — SIGNIFICANT CHANGE UP (ref 8.4–10.5)
CHLORIDE SERPL-SCNC: 104 MMOL/L — SIGNIFICANT CHANGE UP (ref 96–108)
CO2 SERPL-SCNC: 28 MMOL/L — SIGNIFICANT CHANGE UP (ref 22–31)
COLOR SPEC: YELLOW — SIGNIFICANT CHANGE UP
CREAT SERPL-MCNC: 0.93 MG/DL — SIGNIFICANT CHANGE UP (ref 0.5–1.3)
DIFF PNL FLD: NEGATIVE — SIGNIFICANT CHANGE UP
EGFR: 72 ML/MIN/1.73M2 — SIGNIFICANT CHANGE UP
EOSINOPHIL # BLD AUTO: 0.1 K/UL — SIGNIFICANT CHANGE UP (ref 0–0.5)
EOSINOPHIL NFR BLD AUTO: 1.7 % — SIGNIFICANT CHANGE UP (ref 0–6)
GLUCOSE SERPL-MCNC: 93 MG/DL — SIGNIFICANT CHANGE UP (ref 70–99)
GLUCOSE UR QL: NEGATIVE MG/DL — SIGNIFICANT CHANGE UP
HCT VFR BLD CALC: 36.4 % — SIGNIFICANT CHANGE UP (ref 34.5–45)
HGB BLD-MCNC: 12.1 G/DL — SIGNIFICANT CHANGE UP (ref 11.5–15.5)
IMM GRANULOCYTES NFR BLD AUTO: 0.3 % — SIGNIFICANT CHANGE UP (ref 0–0.9)
INR BLD: 0.89 RATIO — SIGNIFICANT CHANGE UP (ref 0.85–1.18)
KETONES UR-MCNC: NEGATIVE MG/DL — SIGNIFICANT CHANGE UP
LEUKOCYTE ESTERASE UR-ACNC: NEGATIVE — SIGNIFICANT CHANGE UP
LIDOCAIN IGE QN: 39 U/L — SIGNIFICANT CHANGE UP (ref 16–77)
LYMPHOCYTES # BLD AUTO: 1.82 K/UL — SIGNIFICANT CHANGE UP (ref 1–3.3)
LYMPHOCYTES # BLD AUTO: 31.7 % — SIGNIFICANT CHANGE UP (ref 13–44)
MCHC RBC-ENTMCNC: 30.6 PG — SIGNIFICANT CHANGE UP (ref 27–34)
MCHC RBC-ENTMCNC: 33.2 GM/DL — SIGNIFICANT CHANGE UP (ref 32–36)
MCV RBC AUTO: 92.2 FL — SIGNIFICANT CHANGE UP (ref 80–100)
MONOCYTES # BLD AUTO: 0.71 K/UL — SIGNIFICANT CHANGE UP (ref 0–0.9)
MONOCYTES NFR BLD AUTO: 12.3 % — SIGNIFICANT CHANGE UP (ref 2–14)
NEUTROPHILS # BLD AUTO: 3.07 K/UL — SIGNIFICANT CHANGE UP (ref 1.8–7.4)
NEUTROPHILS NFR BLD AUTO: 53.5 % — SIGNIFICANT CHANGE UP (ref 43–77)
NITRITE UR-MCNC: NEGATIVE — SIGNIFICANT CHANGE UP
NRBC # BLD: 0 /100 WBCS — SIGNIFICANT CHANGE UP (ref 0–0)
PH UR: 7 — SIGNIFICANT CHANGE UP (ref 5–8)
PLATELET # BLD AUTO: 220 K/UL — SIGNIFICANT CHANGE UP (ref 150–400)
POTASSIUM SERPL-MCNC: 4 MMOL/L — SIGNIFICANT CHANGE UP (ref 3.5–5.3)
POTASSIUM SERPL-SCNC: 4 MMOL/L — SIGNIFICANT CHANGE UP (ref 3.5–5.3)
PROT SERPL-MCNC: 6.6 G/DL — SIGNIFICANT CHANGE UP (ref 6–8.3)
PROT UR-MCNC: NEGATIVE MG/DL — SIGNIFICANT CHANGE UP
PROTHROM AB SERPL-ACNC: 10.4 SEC — SIGNIFICANT CHANGE UP (ref 9.5–13)
RBC # BLD: 3.95 M/UL — SIGNIFICANT CHANGE UP (ref 3.8–5.2)
RBC # FLD: 14 % — SIGNIFICANT CHANGE UP (ref 10.3–14.5)
SODIUM SERPL-SCNC: 138 MMOL/L — SIGNIFICANT CHANGE UP (ref 135–145)
SP GR SPEC: 1.01 — SIGNIFICANT CHANGE UP (ref 1–1.03)
TROPONIN I, HIGH SENSITIVITY RESULT: <4 NG/L — SIGNIFICANT CHANGE UP
UROBILINOGEN FLD QL: 0.2 MG/DL — SIGNIFICANT CHANGE UP (ref 0.2–1)
WBC # BLD: 5.75 K/UL — SIGNIFICANT CHANGE UP (ref 3.8–10.5)
WBC # FLD AUTO: 5.75 K/UL — SIGNIFICANT CHANGE UP (ref 3.8–10.5)

## 2024-09-23 PROCEDURE — 84484 ASSAY OF TROPONIN QUANT: CPT

## 2024-09-23 PROCEDURE — 93010 ELECTROCARDIOGRAM REPORT: CPT

## 2024-09-23 PROCEDURE — 99214 OFFICE O/P EST MOD 30 MIN: CPT

## 2024-09-23 PROCEDURE — 71275 CT ANGIOGRAPHY CHEST: CPT | Mod: 26,MC

## 2024-09-23 PROCEDURE — 96374 THER/PROPH/DIAG INJ IV PUSH: CPT | Mod: XU

## 2024-09-23 PROCEDURE — 85610 PROTHROMBIN TIME: CPT

## 2024-09-23 PROCEDURE — 99285 EMERGENCY DEPT VISIT HI MDM: CPT | Mod: 25

## 2024-09-23 PROCEDURE — 99285 EMERGENCY DEPT VISIT HI MDM: CPT

## 2024-09-23 PROCEDURE — 96375 TX/PRO/DX INJ NEW DRUG ADDON: CPT | Mod: XU

## 2024-09-23 PROCEDURE — 36415 COLL VENOUS BLD VENIPUNCTURE: CPT

## 2024-09-23 PROCEDURE — 74174 CTA ABD&PLVS W/CONTRAST: CPT | Mod: MC

## 2024-09-23 PROCEDURE — 74174 CTA ABD&PLVS W/CONTRAST: CPT | Mod: 26,MC

## 2024-09-23 PROCEDURE — 71275 CT ANGIOGRAPHY CHEST: CPT | Mod: MC

## 2024-09-23 PROCEDURE — 85025 COMPLETE CBC W/AUTO DIFF WBC: CPT

## 2024-09-23 PROCEDURE — 85730 THROMBOPLASTIN TIME PARTIAL: CPT

## 2024-09-23 PROCEDURE — 80053 COMPREHEN METABOLIC PANEL: CPT

## 2024-09-23 PROCEDURE — 83690 ASSAY OF LIPASE: CPT

## 2024-09-23 PROCEDURE — 96376 TX/PRO/DX INJ SAME DRUG ADON: CPT | Mod: XU

## 2024-09-23 PROCEDURE — 81003 URINALYSIS AUTO W/O SCOPE: CPT

## 2024-09-23 PROCEDURE — 93005 ELECTROCARDIOGRAM TRACING: CPT

## 2024-09-23 RX ORDER — METHOCARBAMOL 750 MG/1
2 TABLET, FILM COATED ORAL
Qty: 18 | Refills: 0
Start: 2024-09-23 | End: 2024-09-25

## 2024-09-23 RX ORDER — KETOROLAC TROMETHAMINE 30 MG/ML
15 INJECTION, SOLUTION INTRAMUSCULAR ONCE
Refills: 0 | Status: DISCONTINUED | OUTPATIENT
Start: 2024-09-23 | End: 2024-09-23

## 2024-09-23 RX ADMIN — Medication 2 MILLIGRAM(S): at 14:25

## 2024-09-23 RX ADMIN — KETOROLAC TROMETHAMINE 15 MILLIGRAM(S): 30 INJECTION, SOLUTION INTRAMUSCULAR at 14:09

## 2024-09-23 RX ADMIN — Medication 2 MILLIGRAM(S): at 14:14

## 2024-09-23 RX ADMIN — Medication 2 MILLIGRAM(S): at 13:29

## 2024-09-23 NOTE — ED PROVIDER NOTE - CLINICAL SUMMARY MEDICAL DECISION MAKING FREE TEXT BOX
59-year-old female with left flank pain, labs reviewed acceptable, CT showed no dissection, no significant pathology, patient received morphine, Toradol, pain improved, was able to tolerate p.o. in the ED was to be discharged will DC

## 2024-09-23 NOTE — ED ADULT TRIAGE NOTE - ARRIVAL INFO ADDITIONAL COMMENTS
Patient BIB  from home with severe left flank pain that she was seen in ED yesterday for. Was sent home with Percocet, which helped during the night but woke up in worsening pain. Had CT yesterday, negative. No NVD. No fevers.

## 2024-09-23 NOTE — HISTORY OF PRESENT ILLNESS
[FreeTextEntry1] : Pt denied any changes emotionally but is fed up with not having a specific answer regarding her pain. (States current pain is different than felt with fibromyalgia).  She has seen pain management provider Dr. Olivares, and was started on compounded low dose Naltrexone, which she states had helped, but she had run out of yesterday, also concerned that it would block the effects of prn medication received in the ED yesterday.  Pt concerned about getting the label of being drug seeking. Pt has been adherent with Duloxetine, reports the pain she has is not nerve pain.  Pt denied any other concerns other than acute pain, encouraged strongly by the undersigned to return to the ED given her level of distress.  Writer met briefly with her spouse Ahsan, who had driven her to the appointment, she states she is safe, but pain is contributing to feeling hopeless and helpless, denied suicidal ideation, intent or plan.

## 2024-09-23 NOTE — RISK ASSESSMENT
[No, patient denies ideation or behavior] : No, patient denies ideation or behavior [Low acute suicide risk] : Low acute suicide risk [No] : No [FreeTextEntry8] : intervention is to go to the ED and be evaluated for pain. Pt pain level not consistent with previous examinations.

## 2024-09-23 NOTE — PLAN
[No] : No [Medication education provided] : Medication education provided. [Rationale for medication choices, possible risks/precautions, benefits, alternative treatment choices, and consequences of non-treatment discussed] : Rationale for medication choices, possible risks/precautions, benefits, alternative treatment choices, and consequences of non-treatment discussed with patient/family/caregiver  [FreeTextEntry4] : Acute exacerbation of pain - limiting response to psychiatric medication.  Consider CBT for pain management- if a provider can be found.  [FreeTextEntry5] : Pt returned to [place] ED for evaluation of acute pain.  Continue Duloxetine 50 mg total dose, consider titrating back up to 60 mg.  Explore higher dose of Naltrexone (consider Suboxone) which may be of better help long term.

## 2024-09-23 NOTE — ED PROVIDER NOTE - PATIENT PORTAL LINK FT
You can access the FollowMyHealth Patient Portal offered by [facility] by registering at the following website: [email or web address]. By joining [facility]’s FollowMyHealth portal, you will also be able to view your health information using other applications (apps) compatible with our system.

## 2024-09-23 NOTE — REVIEW OF SYSTEMS
[Negative] : Allergic/Immunologic [FreeTextEntry9] : chronic pain with acute exacerbation, felt in back and ribs.  [de-identified] : see interval history.

## 2024-09-23 NOTE — ED PROVIDER NOTE - OBJECTIVE STATEMENT
59-year-old female with left flank pain was evaluated in the ED yesterday had unremarkable labs/rule out stone scan.  Patient was discharged with Percocet, pain was under control then has breakthrough pain visited ED.  Denies trauma, fever, shortness of breath, nausea vomiting diarrhea.  Denies any other symptoms

## 2024-09-23 NOTE — PHYSICAL EXAM
[Feeling Restless] : feeling ~L restless [Disheveled] : disheveled [Cooperative] : cooperative [Anxious] : anxious [Clear] : clear [Linear/Goal Directed] : linear/goal directed [None] : none [None Reported] : none reported [Average] : average [WNL] : within normal limits [FreeTextEntry5] :  Significant pain, restlessness, unable to sit well.  [de-identified] : tearful [FreeTextEntry7] : Focused on pain and inadequate pain management.

## 2024-09-23 NOTE — ED PROVIDER NOTE - CONSTITUTIONAL, MLM
normal... acute ill appearing, awake, alert, oriented to person, place, time/situation and in moderate distress. due to pain

## 2024-09-23 NOTE — REASON FOR VISIT
[Patient] : Patient [Spouse] : spouse [TextBox_17] :  Spouse Ahsan [FreeTextEntry1] : Pt here for follow up appointment.

## 2024-09-30 ENCOUNTER — APPOINTMENT (OUTPATIENT)
Dept: FAMILY MEDICINE | Facility: CLINIC | Age: 59
End: 2024-09-30
Payer: COMMERCIAL

## 2024-09-30 VITALS
RESPIRATION RATE: 16 BRPM | HEART RATE: 92 BPM | OXYGEN SATURATION: 97 % | SYSTOLIC BLOOD PRESSURE: 106 MMHG | DIASTOLIC BLOOD PRESSURE: 71 MMHG | HEIGHT: 68 IN | WEIGHT: 149 LBS | BODY MASS INDEX: 22.58 KG/M2 | TEMPERATURE: 98.9 F

## 2024-09-30 DIAGNOSIS — R10.9 UNSPECIFIED ABDOMINAL PAIN: ICD-10-CM

## 2024-09-30 DIAGNOSIS — R18.8 OTHER ASCITES: ICD-10-CM

## 2024-09-30 PROCEDURE — 99214 OFFICE O/P EST MOD 30 MIN: CPT

## 2024-09-30 PROCEDURE — G2211 COMPLEX E/M VISIT ADD ON: CPT | Mod: NC

## 2024-09-30 NOTE — ASU DISCHARGE PLAN (ADULT/PEDIATRIC) - BATHING
Patient Quality Outreach    Patient is due for the following:   Colon Cancer Screening    Next Steps:   Patient has appointment coming up    Type of outreach:    Chart review performed, no outreach needed.      Questions for provider review:    None           Hannah Combs MA         No change

## 2024-10-01 PROBLEM — R18.8 ASCITES: Status: ACTIVE | Noted: 2024-09-30

## 2024-10-01 NOTE — HISTORY OF PRESENT ILLNESS
[FreeTextEntry8] : 59-year-old female presents for a follow-up after being discharged from the Emergency Department. She was seen in the ER for severe back pain and has a significant history of chronic pain, fibromyalgia, gastrointestinal issues, and kidney stones. She was in the emergency department twice and was given morphine for each visit due to uncontrollable back pain. Despite the medication, she reported that the pain persisted. She also indicates she has been experiencing constipation, which was noted in her CAT scans. However, she disputes that this constipation could be a cause of her back pain. CAT scan also shows ascites. There are no changes in medication regimen, though patient reported running out of naltrexone, which reportedly helped her symptoms.

## 2024-10-03 NOTE — ASSESSMENT
[FreeTextEntry1] : FAISAL MOFFETT is a 56 year old woman with prior dx of FMS which I agree with given sx as above and exam with diffuse soft tissue tender points, skin hypersensitivity, appearing fatigued. Pt also with mild dry eyes and dry mouth chronically, slightly imbalanced gait and with hx of issues with balance and falls. Remains with paucity of inflammatory arthritis or CTD sx. Responsive to low dose prednisone for markedly worse FMS sx but other medical issues making it not an ideal long term med. Did not tolerate trial of Savella. Nerve blocks are helping partially, also now established with psych who is helping to guide medication. \par \par # FMS \par - c/w duloxetine 60mg \par - c/w lyrica TID \par - use low dose prednisone PRN sparingly for severe flares \par - I remain on board with any changes/input from psychiatry as I suspect this is a concomitant problem. \par - will see how much improvement LAURA provides as well \par - c/w stretching, light aerobic exercises, massage, heat as adjuncts for FMS pain. \par - c/w nonpharmacologic FMS therapies as well - encouraged to incorporate small activities that she finds beneficial during her day, optimize stretching and light exercise, and be mindful of mood, be aware not to overextend on days she has less pain, ensure adequate rest between strenuous activities. \par - starting a new functional med diet, will see if helps sx \par - she remains too symptomatic to work\par \par # ?concomitant inflammatory process \par - reviewed lupus and RA w/u -- all negative \par - will clinically monitor -- no sx today \par \par # papular rash in fixed locations -- odd for posion ivy, appears to be Id reaction\par - c/w derm f/u prn\par - will need allergy eval if persists \par \par RTC in 4 months 
Never

## 2024-10-10 ENCOUNTER — APPOINTMENT (OUTPATIENT)
Dept: ELECTROPHYSIOLOGY | Facility: CLINIC | Age: 59
End: 2024-10-10
Payer: COMMERCIAL

## 2024-10-10 ENCOUNTER — NON-APPOINTMENT (OUTPATIENT)
Age: 59
End: 2024-10-10

## 2024-10-10 PROCEDURE — 93298 REM INTERROG DEV EVAL SCRMS: CPT

## 2024-10-11 ENCOUNTER — APPOINTMENT (OUTPATIENT)
Dept: PULMONOLOGY | Facility: CLINIC | Age: 59
End: 2024-10-11
Payer: COMMERCIAL

## 2024-10-11 VITALS
TEMPERATURE: 98.2 F | BODY MASS INDEX: 22.28 KG/M2 | WEIGHT: 147 LBS | HEART RATE: 70 BPM | SYSTOLIC BLOOD PRESSURE: 122 MMHG | RESPIRATION RATE: 17 BRPM | DIASTOLIC BLOOD PRESSURE: 72 MMHG | HEIGHT: 68 IN | OXYGEN SATURATION: 96 %

## 2024-10-11 DIAGNOSIS — J45.909 UNSPECIFIED ASTHMA, UNCOMPLICATED: ICD-10-CM

## 2024-10-11 PROCEDURE — G2211 COMPLEX E/M VISIT ADD ON: CPT | Mod: NC

## 2024-10-11 PROCEDURE — 99213 OFFICE O/P EST LOW 20 MIN: CPT

## 2024-10-12 NOTE — HISTORY OF PRESENT ILLNESS
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   --    PPD negative here for cough   -has fibromyalgia   chronic  pain on Cymbalta   doing well  on disability   due to this weight loss since dose changes  - asthmatic bronchitis -  when sick or cats  gets wheezing   -cough with singing at times  laughing       or sick  -per MD notes  cough   persisted after infection and flovent was added   2020ct  abd  compared to  2010    chart reviewed  ct 2010  lung stable since  2008  no further  ct  -LLL  stable  tiny nodule  -last cxr 1/2024  no acute dz -seen by allergist  + contact dermatitis    propolis iodopropynl gold  Na thioS  butylcarbonate  - 4/18/2024 57y/o  female  ex smoker   h/o thyroid cancer    --  asthma   allergic    - currently   some   allergies -  pear tree and now some mucous - albuterol  prn only   - PFT reviewed with patient     10/11/2024 57y/o female ex smoker  h/o thryoid cancer fibromyalgia followed by rheum  asthma allergies   pollen trigger -has pets thinks not trigger - albuterol prn  - no chest pain no sob -  [TextBox_11] : 1 [TextBox_13] : 16 [YearQuit] : 1999

## 2024-10-12 NOTE — PROCEDURE
[FreeTextEntry1] : ED visit  for    constipation /   pain constipation dx  had CT  CC: [id number] EXAM: CT ANGIO ABD PELV (W)AW IC ORDERED BY: MARCIA MOROCHO  ACC: [id number] EXAM: CT ANGIO CHEST AORTA WAWIC ORDERED BY: MARCIA MOROCHO  PROCEDURE DATE: 09/23/2024    INTERPRETATION: Clinical data: 59-year-old female with back pain. History of chronic back pain with multiple epidural injections  PROCEDURE: CT Angiography of the Chest, Abdomen and Pelvis. Precontrast imaging was performed through the chest followed by arterial phase imaging of the chest, abdomen and pelvis. Sagittal and coronal reformats were performed as well as 3D (MIP) reconstructions.  COMPARISON: CT 09/22/2024  FINDINGS: CHEST: LUNGS AND LARGE AIRWAYS: Patent central airways. No pulmonary nodules. PLEURA: Trace bilateral pleural effusions. VESSELS: No aortic dissection. No pulmonary embolus. HEART: Heart size is normal. No pericardial effusion. MEDIASTINUM AND MARLEN: No lymphadenopathy. CHEST WALL AND LOWER NECK: Within normal limits.  ABDOMEN AND PELVIS: LIVER: Within normal limits. BILE DUCTS: Normal caliber. GALLBLADDER: Within normal limits. SPLEEN: Within normal limits. PANCREAS: Within normal limits. ADRENALS: Within normal limits. KIDNEYS/URETERS: Within normal limits.  BLADDER: Within normal limits. REPRODUCTIVE ORGANS: Uterus and adnexa within normal limits.  BOWEL: No bowel obstruction. Appendix normal. Diffuse moderate colonic dilatation with semisolid contents. Moderate small bowel dilatation PERITONEUM/RETROPERITONEUM: Small ascites. VESSELS: Within normal limits. LYMPH NODES: No lymphadenopathy. ABDOMINAL WALL: Within normal limits. BONES: Degenerative change.  IMPRESSION: No aortic dissection. Diffuse moderate colonic dilatation and moderate small bowel dilatation without obstruction    --- End of Report ---      LEO GUNN MD; Attending Radiologist This document has been electronically signed. Sep 23 2024 [id number]/o female ex smoker BMI    PFT 3/21/2024 Good efforts Spirometry suggestive of normal FVC FEV1 and FEV1/FVC ratio There is no significant bronchodilator response FEF 25-75% is normal  MVV is reduced suggestive of normal SVC and ERV  impression Spirometry is normal although there is no significant bronchodilator response an increase in 75% is seen in FEF 75% post bronchodilator is seen -Reduced MVV maybe due to poor efforts or neuromuscular weakness recommend clinical correlation chart reviewed     allergy testing  2/2023  contact dermatitis    propolis iodopropynl gold  Na thioS  butylcarbonate     PROCEDURE DATE: Oct 5 2010 . INTERPRETATION: Clinical data: Hematuria and left flank pain. History pulmonary nodules. FINDINGS: Helical CT of the thorax, abdomen, and pelvis was performed from the level of the thoracic inlet to the symphysis pubis without intravenous contrast. Examination is compared to the prior chest CT of 09/30/2009 and the abdominal CT scan of 06/26/2008. There is no significant axillary, hilar, or mediastinal lymphadenopathy. The heart is normal in size. There is mild pericardial thickening unchanged. No pleural effusions are noted. Multiple small pulmonary nodules are unchanged from 07/10/2008. These include bilateral intrapulmonary lymph nodes as well as a 4 mm left lower lobe nodule, series 3 image 96, a 3 mm superior segment right lower lobe nodule, image 49, and a 2 mm superior left lower lobe nodule, image 52. Central airways are patent. Abdomen and pelvis: No urinary tract calculus or hydronephrosis is visualized. There is no evident abnormality of the urinary bladder. The uterus is mildly enlarged. No bowel obstruction is demonstrated. The appendix is normal aside for presence of retained contrast or an appendicolith. Noncontrast views of the upper abdomen reveal no abnormality of the liver, spleen, kidneys, adrenal glands, pancreas, gallbladder, or biliary tree. There is no significant retroperitoneal lymphadenopathy. Skeletal structures are unremarkable. IMPRESSION: Multiple pulmonary nodules unchanged from 07/10/2008 which require no further evaluation. Cause of left flank pain and hematuria not identified LEO GUNN M.D., ATTENDING RADIOLOGIST This examination was interpreted on:    {orig_read_dtime         \\} This document has been electronically signed. Oct 5 2010 4:57PM

## 2024-10-12 NOTE — PROCEDURE
[FreeTextEntry1] : ED visit  for    constipation /   pain constipation dx  had CT  CC: [id number] EXAM: CT ANGIO ABD PELV (W)AW IC ORDERED BY: MARCIA MOROCHO  ACC: [id number] EXAM: CT ANGIO CHEST AORTA WAWIC ORDERED BY: MARCIA MOROCHO  PROCEDURE DATE: 09/23/2024    INTERPRETATION: Clinical data: 59-year-old female with back pain. History of chronic back pain with multiple epidural injections  PROCEDURE: CT Angiography of the Chest, Abdomen and Pelvis. Precontrast imaging was performed through the chest followed by arterial phase imaging of the chest, abdomen and pelvis. Sagittal and coronal reformats were performed as well as 3D (MIP) reconstructions.  COMPARISON: CT 09/22/2024  FINDINGS: CHEST: LUNGS AND LARGE AIRWAYS: Patent central airways. No pulmonary nodules. PLEURA: Trace bilateral pleural effusions. VESSELS: No aortic dissection. No pulmonary embolus. HEART: Heart size is normal. No pericardial effusion. MEDIASTINUM AND MARLEN: No lymphadenopathy. CHEST WALL AND LOWER NECK: Within normal limits.  ABDOMEN AND PELVIS: LIVER: Within normal limits. BILE DUCTS: Normal caliber. GALLBLADDER: Within normal limits. SPLEEN: Within normal limits. PANCREAS: Within normal limits. ADRENALS: Within normal limits. KIDNEYS/URETERS: Within normal limits.  BLADDER: Within normal limits. REPRODUCTIVE ORGANS: Uterus and adnexa within normal limits.  BOWEL: No bowel obstruction. Appendix normal. Diffuse moderate colonic dilatation with semisolid contents. Moderate small bowel dilatation PERITONEUM/RETROPERITONEUM: Small ascites. VESSELS: Within normal limits. LYMPH NODES: No lymphadenopathy. ABDOMINAL WALL: Within normal limits. BONES: Degenerative change.  IMPRESSION: No aortic dissection. Diffuse moderate colonic dilatation and moderate small bowel dilatation without obstruction    --- End of Report ---      LEO GUNN MD; Attending Radiologist This document has been electronically signed. Sep 23 2024 3:[id number]/o female ex smoker BMI    PFT 3/21/2024 Good efforts Spirometry suggestive of normal FVC FEV1 and FEV1/FVC ratio There is no significant bronchodilator response FEF 25-75% is normal  MVV is reduced suggestive of normal SVC and ERV  impression Spirometry is normal although there is no significant bronchodilator response an increase in 75% is seen in FEF 75% post bronchodilator is seen -Reduced MVV maybe due to poor efforts or neuromuscular weakness recommend clinical correlation chart reviewed     allergy testing  2/2023  contact dermatitis    propolis iodopropynl gold  Na thioS  butylcarbonate     PROCEDURE DATE: Oct 5 2010 . INTERPRETATION: Clinical data: Hematuria and left flank pain. History pulmonary nodules. FINDINGS: Helical CT of the thorax, abdomen, and pelvis was performed from the level of the thoracic inlet to the symphysis pubis without intravenous contrast. Examination is compared to the prior chest CT of 09/30/2009 and the abdominal CT scan of 06/26/2008. There is no significant axillary, hilar, or mediastinal lymphadenopathy. The heart is normal in size. There is mild pericardial thickening unchanged. No pleural effusions are noted. Multiple small pulmonary nodules are unchanged from 07/10/2008. These include bilateral intrapulmonary lymph nodes as well as a 4 mm left lower lobe nodule, series 3 image 96, a 3 mm superior segment right lower lobe nodule, image 49, and a 2 mm superior left lower lobe nodule, image 52. Central airways are patent. Abdomen and pelvis: No urinary tract calculus or hydronephrosis is visualized. There is no evident abnormality of the urinary bladder. The uterus is mildly enlarged. No bowel obstruction is demonstrated. The appendix is normal aside for presence of retained contrast or an appendicolith. Noncontrast views of the upper abdomen reveal no abnormality of the liver, spleen, kidneys, adrenal glands, pancreas, gallbladder, or biliary tree. There is no significant retroperitoneal lymphadenopathy. Skeletal structures are unremarkable. IMPRESSION: Multiple pulmonary nodules unchanged from 07/10/2008 which require no further evaluation. Cause of left flank pain and hematuria not identified LEO GUNN M.D., ATTENDING RADIOLOGIST This examination was interpreted on:    {orig_read_dtime         \\} This document has been electronically signed. Oct 5 2010 4:57PM

## 2024-10-12 NOTE — REVIEW OF SYSTEMS
[Recent Wt Loss (___ Lbs)] : ~T recent [unfilled] lb weight loss [Postnasal Drip] : postnasal drip [Cough] : cough [Seasonal Allergies] : seasonal allergies [Chronic Pain] : chronic pain [Thyroid Problem] : thyroid problem [Fever] : no fever [Fatigue] : no fatigue [Recent Wt Gain (___ Lbs)] : ~T no recent weight gain [Chills] : no chills [Dry Eyes] : no dry eyes [Sore Throat] : no sore throat [Dry Mouth] : no dry mouth [Sinus Problems] : no sinus problems [Hemoptysis] : no hemoptysis [Chest Tightness] : no chest tightness [Sputum] : no sputum [Dyspnea] : no dyspnea [Wheezing] : no wheezing [Chest Discomfort] : no chest discomfort [Palpitations] : no palpitations [GERD] : no gerd [Abdominal Pain] : no abdominal pain [Nausea] : no nausea [Vomiting] : no vomiting [Dysphagia] : no dysphagia [Bleeding] : no bleeding [Rash] : no rash [Blood Transfusion] : no blood transfusion [Clotting Disorder/ Frequent bleeding] : no clotting disorder/ frequent bleeding [Diabetes] : no diabetes [Obesity] : no obesity [TextBox_3] : 25 pounds     loss

## 2024-10-12 NOTE — ASSESSMENT
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

## 2024-10-12 NOTE — PHYSICAL EXAM
[III] : Mallampati Class: III [Normal Appearance] : normal appearance [Supple] : supple [No Neck Mass] : no neck mass [No JVD] : no jvd [Normal Rate/Rhythm] : normal rate/rhythm [Normal S1, S2] : normal s1, s2 [No Murmurs] : no murmurs [No Resp Distress] : no resp distress [No Acc Muscle Use] : no acc muscle use [Clear to Auscultation Bilaterally] : clear to auscultation bilaterally [No Abnormalities] : no abnormalities [Benign] : benign [Not Tender] : not tender [Normal Gait] : normal gait [No Clubbing] : no clubbing [No Edema] : no edema [No Rash] : no rash [No Focal Deficits] : no focal deficits [Normal Affect] : normal affect [TextBox_2] : pleasant f no distress no cough no sob [TextBox_11] : no lesion moist

## 2024-10-12 NOTE — HISTORY OF PRESENT ILLNESS
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   --    PPD negative here for cough   -has fibromyalgia   chronic  pain on Cymbalta   doing well  on disability   due to this weight loss since dose changes  - asthmatic bronchitis -  when sick or cats  gets wheezing   -cough with singing at times  laughing       or sick  -per MD notes  cough   persisted after infection and flovent was added   2020ct  abd  compared to  2010    chart reviewed  ct 2010  lung stable since  2008  no further  ct  -LLL  stable  tiny nodule  -last cxr 1/2024  no acute dz -seen by allergist  + contact dermatitis    propolis iodopropynl gold  Na thioS  butylcarbonate  - 4/18/2024 57y/o  female  ex smoker   h/o thyroid cancer    --  asthma   allergic    - currently   some   allergies -  pear tree and now some mucous - albuterol  prn only   - PFT reviewed with patient     10/11/2024 57y/o female ex smoker  h/o thryoid cancer fibromyalgia followed by rheum  asthma allergies   pollen trigger -has pets thinks not trigger - albuterol prn  - no chest pain no sob -  [TextBox_11] : 1 [TextBox_13] : 16 [YearQuit] : 1999

## 2024-10-13 PROBLEM — R10.9 ABDOMINAL PAIN, UNSPECIFIED ABDOMINAL LOCATION: Status: ACTIVE | Noted: 2024-10-13 | Resolved: 2024-11-12

## 2024-10-17 ENCOUNTER — NON-APPOINTMENT (OUTPATIENT)
Age: 59
End: 2024-10-17

## 2024-10-17 ENCOUNTER — APPOINTMENT (OUTPATIENT)
Dept: CARDIOLOGY | Facility: CLINIC | Age: 59
End: 2024-10-17
Payer: COMMERCIAL

## 2024-10-17 VITALS
HEART RATE: 78 BPM | WEIGHT: 150 LBS | SYSTOLIC BLOOD PRESSURE: 115 MMHG | HEIGHT: 68 IN | BODY MASS INDEX: 22.73 KG/M2 | DIASTOLIC BLOOD PRESSURE: 80 MMHG

## 2024-10-17 DIAGNOSIS — J90 PLEURAL EFFUSION, NOT ELSEWHERE CLASSIFIED: ICD-10-CM

## 2024-10-17 DIAGNOSIS — Z87.898 PERSONAL HISTORY OF OTHER SPECIFIED CONDITIONS: ICD-10-CM

## 2024-10-17 PROCEDURE — 99214 OFFICE O/P EST MOD 30 MIN: CPT | Mod: 25

## 2024-10-17 PROCEDURE — 93000 ELECTROCARDIOGRAM COMPLETE: CPT

## 2024-10-17 NOTE — PHYSICAL EXAM
[Normal Conjunctiva] : normal conjunctiva [Normal] : moves all extremities, no focal deficits, normal speech [Appears Anxious] : appears anxious [de-identified] : no acute distress [de-identified] : supple, no carotid bruits b/l [de-identified] : JVP ~ 7 cm H20, RRR, s1, s2, no murmurs [de-identified] : unlabored respirations, clear lung fields [de-identified] : non-distended [de-identified] : no lower extremity edema

## 2024-10-17 NOTE — REVIEW OF SYSTEMS
Patient's father signed declination for flu and HPV vaccines at office visit today with Shreya LACEY.  Declination on file.   [Headache] : headache [Feeling Fatigued] : feeling fatigued [Joint Pain] : joint pain [Blurry Vision] : no blurred vision [SOB] : no shortness of breath [Chest Discomfort] : no chest discomfort [Lower Ext Edema] : no extremity edema [Palpitations] : no palpitations [Syncope] : no syncope [Cough] : no cough [Abdominal Pain] : no abdominal pain [Rash] : no rash [Dizziness] : no dizziness [Confusion] : no confusion was observed [Easy Bruising] : no tendency for easy bruising

## 2024-10-17 NOTE — ASSESSMENT
[FreeTextEntry1] : Assessment: Sis Dixon is a 59 year old woman with past medical history of Normal coronaries (2021), Lacunar CVA (s/p ILR), Cervical stenosis, Fibromyalgia, Gastritis, Hiatal hernia, Anxiety & Depression presents for follow up visit.  The patient reports she was recently evaluated at [facility] ER due to severe back pain and reports that the imaging indicated pleural effusions. She currently denies exertional angina or dyspnea, denies paroxysmal nocturnal dyspnea or leg edema. ECG consistent with normal sinus rhythm and nonspecific ST abnormalities, similar to prior ECG. CT chest report (9/2024) consistent with trace b/l pleural effusions, no aortic dissection, no pericardial effusion. Recent echo (5/2024) consistent with normal LVEF 55-60%. Coronary angiogram (9/2021) consistent with normal coronary arteries. Chart review indicates recent ILR transmissions were negative for arrhythmias. At this time there are no signs of decompensated heart failure that would warrant further workup.  Recommendations: [] Trace pleural effusions: Not clinically significant given no dyspnea, normal exam and no hypoxia. The effusions may be due to her inflammatory conditions. Will monitor for any new symptoms [] Chest discomfort: Chronic and no recurrent symptoms at this time. Recent echo in May with normal LV and RV systolic function. In addition, patient had normal coronary angiogram in 9/2021. No signs of arrhythmia on ILR interrogation reports (ILR can be removed this month, will plan to address this at next visit). Patient takes Aspirin 81 mg daily. [] Risk factors: 10 yr ASCVD risk score 1.6% is low,  mg/dl in 4/2024 is improvement from 122 mg/dl, continue lifestyle modifications with AHA/Mediterranean diet and aerobic exercise regimen as tolerated. No indication for lipid lowering medication at this time.  [] Return to office: January 2025 or sooner if needed

## 2024-10-17 NOTE — CARDIOLOGY SUMMARY
[de-identified] : ECG (9/8/22): sinus bradycardia, nonspecific ST abnormalities (similar to prior ECG) ECG (9/16/22): sinus bradycardia, nonspecific ST abnormalities  ECG (1/19/23): normal sinus rhythm, nonspecific ST abnormalities, artifact ECG (4/20/23): normal sinus rhythm, nonspecific ST abnormalities  ECG (12/15/23): normal sinus rhythm, RSR', nonspecific ST abnormalities  ECG (10/17/24): normal sinus rhythm, nonspecific ST abnormalities [de-identified] : Nuclear Stress Test (8/2021): Medium sized defect suggestive of ischemia  [de-identified] : TTE (5/2024): LVEF 55-60%. Normal RV size and systolic function. No pericardial effusion. [de-identified] : Coronary angiogram (9/2021): Normal coronary arteries.

## 2024-10-17 NOTE — PHYSICAL EXAM
[Normal Conjunctiva] : normal conjunctiva [Normal] : moves all extremities, no focal deficits, normal speech [Appears Anxious] : appears anxious [de-identified] : no acute distress [de-identified] : supple, no carotid bruits b/l [de-identified] : JVP ~ 7 cm H20, RRR, s1, s2, no murmurs [de-identified] : unlabored respirations, clear lung fields [de-identified] : non-distended [de-identified] : no lower extremity edema

## 2024-10-17 NOTE — HISTORY OF PRESENT ILLNESS
[FreeTextEntry1] : Sis Dixon is a 59 year old woman with past medical history of Normal coronaries (2021), Lacunar CVA (s/p ILR), Cervical stenosis, Fibromyalgia, Gastritis, Hiatal hernia, Anxiety & Depression presents for follow up visit.  The patient reports she was recently evaluated at [facility] ER due to severe back pain and reports that the imaging indicated pleural effusions. She reports that the pain is subsiding. She denies chest pain or shortness of breath.

## 2024-10-17 NOTE — CARDIOLOGY SUMMARY
[de-identified] : ECG (9/8/22): sinus bradycardia, nonspecific ST abnormalities (similar to prior ECG) ECG (9/16/22): sinus bradycardia, nonspecific ST abnormalities  ECG (1/19/23): normal sinus rhythm, nonspecific ST abnormalities, artifact ECG (4/20/23): normal sinus rhythm, nonspecific ST abnormalities  ECG (12/15/23): normal sinus rhythm, RSR', nonspecific ST abnormalities  ECG (10/17/24): normal sinus rhythm, nonspecific ST abnormalities [de-identified] : Nuclear Stress Test (8/2021): Medium sized defect suggestive of ischemia  [de-identified] : TTE (5/2024): LVEF 55-60%. Normal RV size and systolic function. No pericardial effusion. [de-identified] : Coronary angiogram (9/2021): Normal coronary arteries.

## 2024-10-17 NOTE — HISTORY OF PRESENT ILLNESS
[FreeTextEntry1] : Sis Dixon is a 59 year old woman with past medical history of Normal coronaries (2021), Lacunar CVA (s/p ILR), Cervical stenosis, Fibromyalgia, Gastritis, Hiatal hernia, Anxiety & Depression presents for follow up visit.  The patient reports she was recently evaluated at [facility] ER due to severe back pain and reports that the imaging indicated pleural effusions. She reports that the pain is subsiding. She denies chest pain or shortness of breath.

## 2024-10-17 NOTE — ASSESSMENT
[FreeTextEntry1] : Assessment: Sis Dixon is a 59 year old woman with past medical history of Normal coronaries (2021), Lacunar CVA (s/p ILR), Cervical stenosis, Fibromyalgia, Gastritis, Hiatal hernia, Anxiety & Depression presents for follow up visit.  The patient reports she was recently evaluated at [facility] ER due to severe back pain and reports that the imaging indicated pleural effusions. She currently denies exertional angina or dyspnea, denies paroxysmal nocturnal dyspnea or leg edema. ECG consistent with normal sinus rhythm and nonspecific ST abnormalities, similar to prior ECG. CT chest report (9/2024) consistent with trace b/l pleural effusions, no aortic dissection, no pericardial effusion. Recent echo (5/2024) consistent with normal LVEF 55-60%. Coronary angiogram (9/2021) consistent with normal coronary arteries. Chart review indicates recent ILR transmissions were negative for arrhythmias. At this time there are no signs of decompensated heart failure that would warrant further workup.  Recommendations: [] Trace pleural effusions: Not clinically significant given no dyspnea, normal exam and no hypoxia. The effusions may be due to her inflammatory conditions. Will monitor for any new symptoms [] Chest discomfort: Chronic and no recurrent symptoms at this time. Recent echo in May with normal LV and RV systolic function. In addition, patient had normal coronary angiogram in 9/2021. No signs of arrhythmia on ILR interrogation reports (ILR can be removed this month, will plan to address this at next visit). Patient takes Aspirin 81 mg daily. [] Risk factors: 10 yr ASCVD risk score 1.6% is low,  mg/dl in 4/2024 is improvement from 122 mg/dl, continue lifestyle modifications with AHA/Mediterranean diet and aerobic exercise regimen as tolerated. No indication for lipid lowering medication at this time.  [] Return to office: January 2025 or sooner if needed

## 2024-10-17 NOTE — REVIEW OF SYSTEMS
[Headache] : headache [Feeling Fatigued] : feeling fatigued [Joint Pain] : joint pain [Blurry Vision] : no blurred vision [SOB] : no shortness of breath [Chest Discomfort] : no chest discomfort [Lower Ext Edema] : no extremity edema [Palpitations] : no palpitations [Syncope] : no syncope [Cough] : no cough [Abdominal Pain] : no abdominal pain [Rash] : no rash [Dizziness] : no dizziness [Confusion] : no confusion was observed [Easy Bruising] : no tendency for easy bruising

## 2024-10-21 ENCOUNTER — APPOINTMENT (OUTPATIENT)
Dept: MRI IMAGING | Facility: HOSPITAL | Age: 59
End: 2024-10-21

## 2024-10-21 ENCOUNTER — OUTPATIENT (OUTPATIENT)
Dept: OUTPATIENT SERVICES | Facility: HOSPITAL | Age: 59
LOS: 1 days | End: 2024-10-21
Payer: COMMERCIAL

## 2024-10-21 DIAGNOSIS — E89.0 POSTPROCEDURAL HYPOTHYROIDISM: Chronic | ICD-10-CM

## 2024-10-21 DIAGNOSIS — Z98.890 OTHER SPECIFIED POSTPROCEDURAL STATES: Chronic | ICD-10-CM

## 2024-10-21 DIAGNOSIS — R10.9 UNSPECIFIED ABDOMINAL PAIN: ICD-10-CM

## 2024-10-21 DIAGNOSIS — M54.50 LOW BACK PAIN, UNSPECIFIED: ICD-10-CM

## 2024-10-21 DIAGNOSIS — R18.8 OTHER ASCITES: ICD-10-CM

## 2024-10-21 PROCEDURE — 74181 MRI ABDOMEN W/O CONTRAST: CPT | Mod: 26

## 2024-10-21 PROCEDURE — 72195 MRI PELVIS W/O DYE: CPT

## 2024-10-21 PROCEDURE — 72195 MRI PELVIS W/O DYE: CPT | Mod: 26

## 2024-10-21 PROCEDURE — 74181 MRI ABDOMEN W/O CONTRAST: CPT

## 2024-10-29 ENCOUNTER — NON-APPOINTMENT (OUTPATIENT)
Age: 59
End: 2024-10-29

## 2024-10-29 ENCOUNTER — TRANSCRIPTION ENCOUNTER (OUTPATIENT)
Age: 59
End: 2024-10-29

## 2024-11-14 ENCOUNTER — NON-APPOINTMENT (OUTPATIENT)
Age: 59
End: 2024-11-14

## 2024-11-14 ENCOUNTER — APPOINTMENT (OUTPATIENT)
Dept: ELECTROPHYSIOLOGY | Facility: CLINIC | Age: 59
End: 2024-11-14
Payer: COMMERCIAL

## 2024-11-14 PROCEDURE — 93298 REM INTERROG DEV EVAL SCRMS: CPT

## 2024-11-18 ENCOUNTER — APPOINTMENT (OUTPATIENT)
Dept: GASTROENTEROLOGY | Facility: CLINIC | Age: 59
End: 2024-11-18
Payer: COMMERCIAL

## 2024-11-18 VITALS
DIASTOLIC BLOOD PRESSURE: 86 MMHG | HEART RATE: 85 BPM | BODY MASS INDEX: 22.88 KG/M2 | TEMPERATURE: 98 F | RESPIRATION RATE: 16 BRPM | WEIGHT: 151 LBS | OXYGEN SATURATION: 96 % | HEIGHT: 68 IN | SYSTOLIC BLOOD PRESSURE: 123 MMHG

## 2024-11-18 DIAGNOSIS — R14.0 ABDOMINAL DISTENSION (GASEOUS): ICD-10-CM

## 2024-11-18 DIAGNOSIS — K59.09 OTHER CONSTIPATION: ICD-10-CM

## 2024-11-18 DIAGNOSIS — K86.2 CYST OF PANCREAS: ICD-10-CM

## 2024-11-18 PROCEDURE — 99214 OFFICE O/P EST MOD 30 MIN: CPT

## 2024-11-18 NOTE — ASSESSMENT
[FreeTextEntry1] : Recommended a trial of Linzess but she refused this. Continue PEG.  Gave her information regarding the [facility] gastroenterology department to schedule consultation there to discuss possible EUS of pancreas. I recommended that she only get periodic imaging for what appears to be low-risk cystic lesions of the pancreas but she would like to talk to a physician who performs EUS.

## 2024-11-18 NOTE — HISTORY OF PRESENT ILLNESS
[FreeTextEntry1] : Follow up visit for abdominal complaints, abnormal pancreas imaging. Reports reviewed. She reports + bloating, distention, gas She took simethicone without relief. Denies blood in stool. Reports Yellow + orange liquid stool . Reports lower back pain on left side as well as right-sided upper back pain. She reports she stopped Naltrexone.  Takes PEG for constipation. Liquid stools for a long time, worsened after colonoscopy per patient.  Reports a 'poor' appetite and states she lost 30 lbs (no recent significant weight loss noted in [employer]).  Reports that she eats fruits and veggies, broccoli, cabbage. Gluten free since 2009  Reviewed EGD and colonoscopy reports  CT scan showed moderate colonic distention without obstruction.  MRI showed tiny pancreas cystic lesions for which she saw a specialist at [facility] in [place]. They apparently recommended imaging surveillance. She is unhappy with this recommendation and would like to be evaluated for an endoscopic ultrasound of the pancreas. [de-identified] : ACC: [id number]      EXAM:  CT ANGIO ABD PELV (W)AW IC   ORDERED BY:  MARCIA MOROCHO  ACC: [id number]     EXAM:  CT ANGIO CHEST AORTA WAWIC   ORDERED BY:  MARCIA MOROCHO  PROCEDURE DATE:  09/23/2024    INTERPRETATION:  Clinical data: 59-year-old female with back pain. History of chronic back pain with multiple epidural injections  PROCEDURE: CT Angiography of the Chest, Abdomen and Pelvis. Precontrast imaging was performed through the chest followed by arterial phase imaging of the chest, abdomen and pelvis. Sagittal and coronal reformats were performed as well as 3D (MIP) reconstructions.  COMPARISON: CT 09/22/2024  FINDINGS: CHEST: LUNGS AND LARGE AIRWAYS: Patent central airways. No pulmonary nodules. PLEURA: Trace bilateral pleural effusions. VESSELS: No aortic dissection. No pulmonary embolus. HEART: Heart size is normal. No pericardial effusion. MEDIASTINUM AND MARLEN: No lymphadenopathy. CHEST WALL AND LOWER NECK: Within normal limits.  ABDOMEN AND PELVIS: LIVER: Within normal limits. BILE DUCTS: Normal caliber. GALLBLADDER: Within normal limits. SPLEEN: Within normal limits. PANCREAS: Within normal limits. ADRENALS: Within normal limits. KIDNEYS/URETERS: Within normal limits.  BLADDER: Within normal limits. REPRODUCTIVE ORGANS: Uterus and adnexa within normal limits.  BOWEL: No bowel obstruction. Appendix normal. Diffuse moderate colonic dilatation with semisolid contents. Moderate small bowel dilatation PERITONEUM/RETROPERITONEUM: Small ascites. VESSELS: Within normal limits. LYMPH NODES: No lymphadenopathy. ABDOMINAL WALL: Within normal limits. BONES: Degenerative change.  IMPRESSION: No aortic dissection.  Diffuse moderate colonic dilatation and moderate small bowel dilatation without obstruction

## 2024-12-10 ENCOUNTER — APPOINTMENT (OUTPATIENT)
Dept: RHEUMATOLOGY | Facility: CLINIC | Age: 59
End: 2024-12-10
Payer: COMMERCIAL

## 2024-12-10 VITALS
DIASTOLIC BLOOD PRESSURE: 78 MMHG | RESPIRATION RATE: 16 BRPM | SYSTOLIC BLOOD PRESSURE: 112 MMHG | OXYGEN SATURATION: 97 % | TEMPERATURE: 97.7 F | WEIGHT: 151 LBS | BODY MASS INDEX: 25.16 KG/M2 | HEART RATE: 83 BPM | HEIGHT: 65 IN

## 2024-12-10 DIAGNOSIS — M62.838 OTHER MUSCLE SPASM: ICD-10-CM

## 2024-12-10 DIAGNOSIS — M79.7 FIBROMYALGIA: ICD-10-CM

## 2024-12-10 PROCEDURE — G2211 COMPLEX E/M VISIT ADD ON: CPT | Mod: NC

## 2024-12-10 PROCEDURE — 99214 OFFICE O/P EST MOD 30 MIN: CPT
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
## 2024-12-11 NOTE — ASSESSMENT
[FreeTextEntry1] : KEANU KLEIN is a 59 year old woman with prior dx of FMS which I agree with given sx as above and exam with diffuse soft tissue tender points, skin hypersensitivity, appearing fatigued. Pt also with mild dry eyes and dry mouth chronically, slightly imbalanced gait and with hx of issues with balance and falls. Remains with paucity of inflammatory arthritis or CTD sx. Responsive to low dose prednisone for markedly worse FMS sx but other medical issues making it not an ideal long term med. Did not tolerate trial of Savella. Nerve blocks are helping partially, also now established with psych who is helping to guide medication.   # FMS  - c/w duloxetine 50mg - dosing from psych  - c/w Lyrica - she is using it variably between 1-3x /day PRN which she feels is working well - istop Reference #: [id number] - c/w Flexeril QHS, can use up to TID PRN severe pain  - trial of LDN didn't help, now off but will reconsider if worsening HA as felt it improved that somewhat  - c/w low dose prednisone PRN sparingly severe flares - reports she hasn't had to use, even during recent episode  - I remain on board with any changes/input from psychiatry as I suspect this is a concomitant problem.  - c/w stretching, light aerobic exercises, massage, heat as adjuncts for FMS pain.  - c/w nonpharmacologic FMS therapies as well - encouraged to incorporate small activities that she finds beneficial during her day, optimize stretching and light exercise, and be mindful of mood, be aware not to overextend on days she has less pain, ensure adequate rest between strenuous activities.  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

## 2024-12-11 NOTE — REVIEW OF SYSTEMS
[Feeling Poorly] : feeling poorly [Feeling Tired] : feeling tired [Dry Eyes] : dryness of the eyes [Arthralgias] : arthralgias [Joint Pain] : joint pain [Difficulty Walking] : difficulty walking [As Noted in HPI] : as noted in HPI [Sleep Disturbances] : sleep disturbances [Anxiety] : anxiety [Depression] : depression [Negative] : Heme/Lymph [Fever] : no fever [Chills] : no chills [Joint Swelling] : no joint swelling [Joint Stiffness] : no joint stiffness [Suicidal] : not suicidal [FreeTextEntry3] : + tension headaches

## 2024-12-11 NOTE — DATA REVIEWED
[FreeTextEntry1] : Available notes, and pertinent labs & imaging in EMR reviewed. \par  \par  JULITO, RF negative in 2020. Screening again negative in 2022

## 2024-12-11 NOTE — PHYSICAL EXAM
[General Appearance - Alert] : alert [General Appearance - In No Acute Distress] : in no acute distress [General Appearance - Well Nourished] : well nourished [Sclera] : the sclera and conjunctiva were normal [PERRL With Normal Accommodation] : pupils were equal in size, round, and reactive to light [Extraocular Movements] : extraocular movements were intact [Outer Ear] : the ears and nose were normal in appearance [Neck Appearance] : the appearance of the neck was normal [Auscultation Breath Sounds / Voice Sounds] : lungs were clear to auscultation bilaterally [Heart Rate And Rhythm] : heart rate was normal and rhythm regular [Heart Sounds] : normal S1 and S2 [Edema] : there was no peripheral edema [No CVA Tenderness] : no ~M costovertebral angle tenderness [No Spinal Tenderness] : no spinal tenderness [Nail Clubbing] : no clubbing  or cyanosis of the fingernails [Musculoskeletal - Swelling] : no joint swelling seen [Motor Tone] : muscle strength and tone were normal [] : no rash [Motor Exam] : the motor exam was normal [No Focal Deficits] : no focal deficits [Oriented To Time, Place, And Person] : oriented to person, place, and time [Respiration, Rhythm And Depth] : normal respiratory rhythm and effort [FreeTextEntry1] : Strength intact but gait slightly off balance and tentative - stable but persistent

## 2024-12-11 NOTE — ASSESSMENT
[FreeTextEntry1] : KEANU KLEIN is a 59 year old woman with prior dx of FMS which I agree with given sx as above and exam with diffuse soft tissue tender points, skin hypersensitivity, appearing fatigued. Pt also with mild dry eyes and dry mouth chronically, slightly imbalanced gait and with hx of issues with balance and falls. Remains with paucity of inflammatory arthritis or CTD sx. Responsive to low dose prednisone for markedly worse FMS sx but other medical issues making it not an ideal long term med. Did not tolerate trial of Savella. Nerve blocks are helping partially, also now established with psych who is helping to guide medication.   # FMS  - c/w duloxetine 50mg - dosing from psych  - c/w Lyrica - she is using it variably between 1-3x /day PRN which she feels is working well - istop Reference #: [id number] - c/w Flexeril QHS, can use up to TID PRN severe pain  - trial of LDN didn't help, now off but will reconsider if worsening HA as felt it improved that somewhat  - c/w low dose prednisone PRN sparingly severe flares - reports she hasn't had to use, even during recent episode  - I remain on board with any changes/input from psychiatry as I suspect this is a concomitant problem.  - c/w stretching, light aerobic exercises, massage, heat as adjuncts for FMS pain.  - c/w nonpharmacologic FMS therapies as well - encouraged to incorporate small activities that she finds beneficial during her day, optimize stretching and light exercise, and be mindful of mood, be aware not to overextend on days she has less pain, ensure adequate rest between strenuous activities.  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

## 2024-12-12 ENCOUNTER — APPOINTMENT (OUTPATIENT)
Dept: PSYCHIATRY | Facility: CLINIC | Age: 59
End: 2024-12-12
Payer: COMMERCIAL

## 2024-12-12 DIAGNOSIS — G47.00 INSOMNIA, UNSPECIFIED: ICD-10-CM

## 2024-12-12 DIAGNOSIS — F33.1 MAJOR DEPRESSIVE DISORDER, RECURRENT, MODERATE: ICD-10-CM

## 2024-12-12 PROCEDURE — 99214 OFFICE O/P EST MOD 30 MIN: CPT

## 2024-12-13 NOTE — PLAN
[No] : No [Medication education provided] : Medication education provided. [Rationale for medication choices, possible risks/precautions, benefits, alternative treatment choices, and consequences of non-treatment discussed] : Rationale for medication choices, possible risks/precautions, benefits, alternative treatment choices, and consequences of non-treatment discussed with patient/family/caregiver  [FreeTextEntry4] : Continue Duloxetine 50 mg total dose for pain/depression/anxiety.  [FreeTextEntry5] : Pt uses Hydroxyzine rarely more so for itching than for sleep. Will continue to monitor response to medication. Pt benefits from this level of care to prevent deterioration of functioning.

## 2024-12-13 NOTE — HISTORY OF PRESENT ILLNESS
[FreeTextEntry1] : Pt attempts to care give for her mother who has mild dementia. She has done better with adapting with chronic pain, she uses topical ointments.  Pt reports that she has had pancreatic cysts that are pre-cancerous. Pt reports that she has constipation as a side effect of medication.  Pt resumed Linzess. Pt is going to be followed by [facility]. Pt reports that she stopped low dose Naltrexone due to difficulty with getting prescriptions refilled. Pt states she typically does not use Lyrica or Flexeril tid, reports that when she has pain anxiety increases.  Pt reports that she also used Lidoderm patches. Pt is using Vistaril only prn, sleep is fair, she states she wakes but can go back to sleep.  Pt reports some fatigue and has had diminished self-care, appetite, no agitation, no history of substance use, no history of shalom or psychosis, pt reports she does a lot of meditation which helps to lift her spirits.

## 2024-12-13 NOTE — REASON FOR VISIT
[Patient] : Patient [FreeTextEntry1] : Pt here for management of anxiety and mood in the context of chronic pain.

## 2024-12-13 NOTE — HISTORY OF PRESENT ILLNESS
[FreeTextEntry1] : Pt attempts to care give for her mother who has mild dementia. She has done better with adapting with chronic pain, she uses topical ointments.  Pt reports that she has had pancreatic cysts that are pre-cancerous. Pt reports that she has constipation as a side effect of medication.  Pt resumed Linzess. Pt is going to be followed by [facility]. Pt reports that she stopped low dose Naltrexone due to difficulty with getting prescriptions refilled. Pt states she typically does not use Lyrica or Flexeril tid, reports that when she has pain anxiety increases.  Pt reports that she also used Lidoderm patches. Pt is using Vistaril only prn, sleep is fair, she states she wakes but can go back to sleep.  Pt reports some fatigue and has had diminished self-care, appetite, no agitation, no history of substance use, no history of shalom or psychosis, pt reports she does a lot of meditation which helps to lift her spirits.

## 2024-12-19 ENCOUNTER — APPOINTMENT (OUTPATIENT)
Dept: ELECTROPHYSIOLOGY | Facility: CLINIC | Age: 59
End: 2024-12-19

## 2024-12-19 PROCEDURE — 93298 REM INTERROG DEV EVAL SCRMS: CPT

## 2024-12-24 PROBLEM — F10.90 ALCOHOL USE: Status: INACTIVE | Noted: 2019-09-25

## 2025-01-16 ENCOUNTER — APPOINTMENT (OUTPATIENT)
Dept: CARDIOLOGY | Facility: CLINIC | Age: 60
End: 2025-01-16
Payer: COMMERCIAL

## 2025-01-16 ENCOUNTER — NON-APPOINTMENT (OUTPATIENT)
Age: 60
End: 2025-01-16

## 2025-01-16 VITALS
HEIGHT: 65 IN | DIASTOLIC BLOOD PRESSURE: 70 MMHG | BODY MASS INDEX: 25.33 KG/M2 | HEART RATE: 78 BPM | SYSTOLIC BLOOD PRESSURE: 101 MMHG | OXYGEN SATURATION: 97 % | WEIGHT: 152 LBS

## 2025-01-16 DIAGNOSIS — R07.9 CHEST PAIN, UNSPECIFIED: ICD-10-CM

## 2025-01-16 PROCEDURE — 93000 ELECTROCARDIOGRAM COMPLETE: CPT

## 2025-01-16 PROCEDURE — 99214 OFFICE O/P EST MOD 30 MIN: CPT | Mod: 25

## 2025-01-16 NOTE — PHYSICAL EXAM
[Normal Conjunctiva] : normal conjunctiva [Normal] : moves all extremities, no focal deficits, normal speech [Appears Anxious] : appears anxious [de-identified] : no acute distress [de-identified] : supple, no carotid bruits b/l [de-identified] : JVP ~ 7 cm H20, RRR, s1, s2, no murmurs [de-identified] : unlabored respirations, clear lung fields [de-identified] : non-distended [de-identified] : no lower extremity edema

## 2025-01-16 NOTE — ASSESSMENT
[FreeTextEntry1] : Assessment: Sis Dixon is a 59 year old woman with past medical history of Normal coronaries (2021), Lacunar CVA (s/p ILR), Cervical stenosis, Fibromyalgia, Gastritis, Hiatal hernia, Anxiety & Depression presents for follow up visit.  The patient was last evaluated here in October 2024. She reports recent episodes of chest discomfort radiating to her right shoulder which mostly wakes her from sleep. Denies exertional angina or dyspnea. Denies indigestion or reproducibility of chest pain on palpation. ECG is poor baseline today but sinus rhythm. CT chest report (9/2024) consistent with trace b/l pleural effusions, no aortic dissection, no pericardial effusion. Recent echo (5/2024) consistent with normal LVEF 55-60%. Coronary angiogram (9/2021) consistent with normal coronary arteries. Chart review indicates recent ILR transmissions were negative for arrhythmias.   Recommendations: [] Chest discomfort: Has recurred, has atypical features however due to risk factors will check basic labs and check coronary CTA. The patient reports gait difficulties and frequent falls and declines exercise treadmill stress test due to fall risk. She denies nuclear stress test due to previous adverse reaction with vasodilator agent, so will proceed with CCTA.  [] No signs of arrhythmia on ILR interrogation reports, patient offered for it to be removed but she prefers to keep it in. Patient takes Aspirin 81 mg QOD. [] Risk factors: 10 yr ASCVD risk score 1.6% is low,  mg/dl in 4/2024 is improvement from 122 mg/dl, continue lifestyle modifications with AHA/Mediterranean diet. Check lipid panel this year. [] Return to office: Patient will return after CCTA

## 2025-01-16 NOTE — REVIEW OF SYSTEMS
[Joint Pain] : joint pain [Chest Discomfort] : chest discomfort [Blurry Vision] : no blurred vision [SOB] : no shortness of breath [Lower Ext Edema] : no extremity edema [Palpitations] : no palpitations [Syncope] : no syncope [Cough] : no cough [Rash] : no rash [Dizziness] : no dizziness [Confusion] : no confusion was observed [Easy Bruising] : no tendency for easy bruising

## 2025-01-16 NOTE — CARDIOLOGY SUMMARY
[de-identified] : ECG (9/8/22): sinus bradycardia, nonspecific ST abnormalities (similar to prior ECG) ECG (12/15/23): normal sinus rhythm, RSR', nonspecific ST abnormalities  ECG (10/17/24): normal sinus rhythm, nonspecific ST abnormalities ECG (1/16/25): poor baseline, sinus rhythm [de-identified] : Nuclear Stress Test (8/2021): Medium sized defect suggestive of ischemia  [de-identified] : TTE (5/2024): LVEF 55-60%. Normal RV size and systolic function. No pericardial effusion. [de-identified] : Coronary angiogram (9/2021): Normal coronary arteries.  never intubated

## 2025-01-16 NOTE — HISTORY OF PRESENT ILLNESS
[FreeTextEntry1] : Sis Dixon is a 59 year old woman with past medical history of Normal coronaries (2021), Lacunar CVA (s/p ILR), Cervical stenosis, Fibromyalgia, Gastritis, Hiatal hernia, Anxiety & Depression presents for follow up visit.  The patient was last evaluated here in October 2024. She reports recent episodes of chest discomfort radiating to her right shoulder which mostly wakes her from sleep. Denies exertional chest pain or shortness of breath. Denies indigestion. She does not believe that the chest pain is reproducible on palpation.

## 2025-01-21 RX ORDER — LEVOTHYROXINE SODIUM 112 UG/1
112 CAPSULE ORAL
Qty: 90 | Refills: 3 | Status: ACTIVE | COMMUNITY
Start: 2025-01-21 | End: 1900-01-01

## 2025-01-23 ENCOUNTER — APPOINTMENT (OUTPATIENT)
Dept: ELECTROPHYSIOLOGY | Facility: CLINIC | Age: 60
End: 2025-01-23
Payer: COMMERCIAL

## 2025-01-23 ENCOUNTER — NON-APPOINTMENT (OUTPATIENT)
Age: 60
End: 2025-01-23

## 2025-01-23 PROCEDURE — 93298 REM INTERROG DEV EVAL SCRMS: CPT

## 2025-02-04 ENCOUNTER — APPOINTMENT (OUTPATIENT)
Dept: CT IMAGING | Facility: IMAGING CENTER | Age: 60
End: 2025-02-04
Payer: COMMERCIAL

## 2025-02-04 ENCOUNTER — OUTPATIENT (OUTPATIENT)
Dept: OUTPATIENT SERVICES | Facility: HOSPITAL | Age: 60
LOS: 1 days | End: 2025-02-04
Payer: COMMERCIAL

## 2025-02-04 DIAGNOSIS — E89.0 POSTPROCEDURAL HYPOTHYROIDISM: Chronic | ICD-10-CM

## 2025-02-04 DIAGNOSIS — R07.9 CHEST PAIN, UNSPECIFIED: ICD-10-CM

## 2025-02-04 DIAGNOSIS — Z98.890 OTHER SPECIFIED POSTPROCEDURAL STATES: Chronic | ICD-10-CM

## 2025-02-04 PROCEDURE — 75574 CT ANGIO HRT W/3D IMAGE: CPT | Mod: 26

## 2025-02-04 PROCEDURE — 75574 CT ANGIO HRT W/3D IMAGE: CPT

## 2025-02-05 ENCOUNTER — NON-APPOINTMENT (OUTPATIENT)
Age: 60
End: 2025-02-05

## 2025-02-12 ENCOUNTER — APPOINTMENT (OUTPATIENT)
Dept: ENDOCRINOLOGY | Facility: CLINIC | Age: 60
End: 2025-02-12
Payer: COMMERCIAL

## 2025-02-12 VITALS
HEART RATE: 72 BPM | OXYGEN SATURATION: 98 % | WEIGHT: 155 LBS | DIASTOLIC BLOOD PRESSURE: 67 MMHG | RESPIRATION RATE: 14 BRPM | BODY MASS INDEX: 25.83 KG/M2 | HEIGHT: 65 IN | SYSTOLIC BLOOD PRESSURE: 102 MMHG | TEMPERATURE: 97 F

## 2025-02-12 DIAGNOSIS — C73 MALIGNANT NEOPLASM OF THYROID GLAND: ICD-10-CM

## 2025-02-12 DIAGNOSIS — E03.9 HYPOTHYROIDISM, UNSPECIFIED: ICD-10-CM

## 2025-02-12 PROCEDURE — 99214 OFFICE O/P EST MOD 30 MIN: CPT

## 2025-02-12 NOTE — HISTORY OF PRESENT ILLNESS
[FreeTextEntry1] : Problems: 1. Papillary thyroid cancer 2. Primary Hypothyroidism  Papillary Thyroid cancer/Primary Hypothyroidism Diagnosed in 2006/2007 with thyroid cancer (patient stated she had a follicular variant of papillary thyroid cancer) and she underwent total thyroidectomy in 2006/2007 at [facility] (I did not see the pathology report) - she underwent radioactive iodine therapy at [facility] in 2007 (dose unknown). The patient stated she never had metastases of her thyroid cancer.  10/28/2022 - TSH 12.8 (elevated) 2 Monitoring for recurrence: 2015 to October 2023 - Thyroglobulin antibodies undetectable, thyroglobulin undetectable 10/03/2023 - TSH 0.23 (low), Antithyroglobulin antibodies undetectable, thyroglobulin undetectable 01/26/2024 - US thyroid - Thyroid bed unremarkable, no cervical adenopathy 3. Two sisters had thyroid cancer (she does not know the type of thyroid cancer), no personal history of radiation treatment 4. Meds: Levothyroxine (generic) capsule 112 micrograms po Monday to Friday - fully compliant and patient advised on the appropriate use of levothyroxine. PATIENT HAD GERD WITH THE LEVOTHYROXINE TABLETS BUT DOES NOT HAVE THIS WITH THE LEVOTHYROXINE CAPSULES.

## 2025-02-12 NOTE — ASSESSMENT
[FreeTextEntry1] : Target: TSH in the normal range for the testing lab  This patient was diagnosed with papillary thyroid cancer in 2007 s/p total thyroidectomy and BARDALES. She did not have recurrence and thyroglobulin antibody and thyroglobulin levels were undetectable in October 2023 and thyroid US revealed no recurrence in Jan 2024. Since it has been more than 10 years since her diagnosis of thyroid cancer and she did not have a recurrence, there is no need for further monitoring for recurrence and her goal TSH is the normal range for the testing lab.  Last TSH was at goal.  Last TSH - Feb 2025 - N  Plan: 1. Continue levothyroxine (generic CAPSULES) 112 micrograms po Monday to Friday 2. Labs to be done in 6 months - TSH 3. Follow up in 6 months to review results

## 2025-02-12 NOTE — PHYSICAL EXAM
[de-identified] : General: No distress, well nourished Eyes: Normal Sclera, EOMI, PERRL ENT: Normal appearance of the nose, normal oropharynx Neck/Thyroid: No cervical lymphadenopathy, thyroid gland 20 g in size, no thyroid nodules, non-tender Respiratory: No use of accessory muscles of respiration, vesicular breath sounds heard bilaterally, no crepitations or rhonchi Cardiovascular: S1 and S2 heard and normal, no S3 or S4, no murmurs, radial pulse normal bilaterally Abdomen: soft, non-tender, no masses, normal bowel sounds Musculoskeletal: No swelling or deformities of joints of hands, no pedal edema Neurological: Normal range of motion in the hands, Normal brachioradialis reflexes bilaterally Psychiatry: Patient converses normally, good judgement and insight Skin: No rashes in hands, no nodules palpated in hands

## 2025-02-18 ENCOUNTER — APPOINTMENT (OUTPATIENT)
Dept: PSYCHIATRY | Facility: CLINIC | Age: 60
End: 2025-02-18

## 2025-02-27 ENCOUNTER — APPOINTMENT (OUTPATIENT)
Dept: ELECTROPHYSIOLOGY | Facility: CLINIC | Age: 60
End: 2025-02-27
Payer: COMMERCIAL

## 2025-02-27 ENCOUNTER — NON-APPOINTMENT (OUTPATIENT)
Age: 60
End: 2025-02-27

## 2025-02-27 PROCEDURE — 93298 REM INTERROG DEV EVAL SCRMS: CPT

## 2025-03-06 ENCOUNTER — APPOINTMENT (OUTPATIENT)
Dept: ORTHOPEDIC SURGERY | Facility: CLINIC | Age: 60
End: 2025-03-06
Payer: COMMERCIAL

## 2025-03-06 ENCOUNTER — NON-APPOINTMENT (OUTPATIENT)
Age: 60
End: 2025-03-06

## 2025-03-06 VITALS — WEIGHT: 150 LBS | HEIGHT: 65 IN | BODY MASS INDEX: 24.99 KG/M2

## 2025-03-06 DIAGNOSIS — M25.512 PAIN IN LEFT SHOULDER: ICD-10-CM

## 2025-03-06 PROCEDURE — 73030 X-RAY EXAM OF SHOULDER: CPT | Mod: LT

## 2025-03-06 PROCEDURE — 99204 OFFICE O/P NEW MOD 45 MIN: CPT

## 2025-03-13 ENCOUNTER — APPOINTMENT (OUTPATIENT)
Dept: PSYCHIATRY | Facility: CLINIC | Age: 60
End: 2025-03-13
Payer: COMMERCIAL

## 2025-03-13 DIAGNOSIS — F33.1 MAJOR DEPRESSIVE DISORDER, RECURRENT, MODERATE: ICD-10-CM

## 2025-03-13 DIAGNOSIS — F43.10 POST-TRAUMATIC STRESS DISORDER, UNSPECIFIED: ICD-10-CM

## 2025-03-13 PROCEDURE — 99214 OFFICE O/P EST MOD 30 MIN: CPT

## 2025-03-13 NOTE — PHYSICAL EXAM
[Cooperative] : cooperative [Full] : full [Clear] : clear [Linear/Goal Directed] : linear/goal directed [Average] : average [WNL] : within normal limits [FreeTextEntry2] : chronic back pain.  [Slowed] : slowed [Anxious] : anxious [None] : none [None Reported] : none reported [Memory] : memory [FreeTextEntry1] : adequately groomed and dressed.  [de-identified] : short term memory and reports word finding difficulty and difficulty with sentence completion (not noted today during interview)

## 2025-03-13 NOTE — HISTORY OF PRESENT ILLNESS
[FreeTextEntry1] : Discussed her following up with neurology given her imaging, MRI reviewed.  Pt reports that she would follow up - with Dr. Jacqueline rAora.  Pt denied AH/VH/delusions- pt reports hearing loss in her left ear, due to trauma with a Q tip.  Pt reports that reading is affected by her vision, she has glaucoma and a cataract and findings in her retina, she follows up with [facility].  Has mild dysphoria, denied suicidal or homicidal ideation, intent or plan.

## 2025-03-13 NOTE — PLAN
[No] : No [Medication education provided] : Medication education provided. [Rationale for medication choices, possible risks/precautions, benefits, alternative treatment choices, and consequences of non-treatment discussed] : Rationale for medication choices, possible risks/precautions, benefits, alternative treatment choices, and consequences of non-treatment discussed with patient/family/caregiver  [FreeTextEntry4] : Pt reports she did have neuropsychological testing.  Pt reports previous trauma, pt with several different risk factors for minor neurocognitive disturbance, she has a normal EEG from 2021. Pt has better autobiographical memory, but reports short term memory deficits, it can be combination of chronic medical illness, medication, sleep disturbance, and is on Flexeril and Lyrica B12 deficiency.  [FreeTextEntry5] : Pt states she had Lasix done.  Pt declined adjusting her dose of Duloxetine- feels it has helped mostly for pain. Pt reports she takes a 4-hour nap.  Pt denied suicidal ideation, intent or plan. Pt reports she is NOT taking Hydroxyzine.  Continue Duloxetine 50 mg total dose, her dogs wake her up 3 times nightly.

## 2025-03-28 ENCOUNTER — TRANSCRIPTION ENCOUNTER (OUTPATIENT)
Age: 60
End: 2025-03-28

## 2025-04-03 ENCOUNTER — APPOINTMENT (OUTPATIENT)
Dept: ELECTROPHYSIOLOGY | Facility: CLINIC | Age: 60
End: 2025-04-03
Payer: COMMERCIAL

## 2025-04-03 ENCOUNTER — NON-APPOINTMENT (OUTPATIENT)
Age: 60
End: 2025-04-03

## 2025-04-03 PROCEDURE — 93298 REM INTERROG DEV EVAL SCRMS: CPT

## 2025-04-04 DIAGNOSIS — R73.03 PREDIABETES.: ICD-10-CM

## 2025-04-04 DIAGNOSIS — E03.9 HYPOTHYROIDISM, UNSPECIFIED: ICD-10-CM

## 2025-04-04 DIAGNOSIS — E55.9 VITAMIN D DEFICIENCY, UNSPECIFIED: ICD-10-CM

## 2025-04-04 DIAGNOSIS — R79.89 OTHER SPECIFIED ABNORMAL FINDINGS OF BLOOD CHEMISTRY: ICD-10-CM

## 2025-04-04 DIAGNOSIS — E53.8 DEFICIENCY OF OTHER SPECIFIED B GROUP VITAMINS: ICD-10-CM

## 2025-04-10 ENCOUNTER — TRANSCRIPTION ENCOUNTER (OUTPATIENT)
Age: 60
End: 2025-04-10

## 2025-04-15 ENCOUNTER — TRANSCRIPTION ENCOUNTER (OUTPATIENT)
Age: 60
End: 2025-04-15

## 2025-04-15 ENCOUNTER — APPOINTMENT (OUTPATIENT)
Dept: RHEUMATOLOGY | Facility: CLINIC | Age: 60
End: 2025-04-15
Payer: COMMERCIAL

## 2025-04-15 VITALS
HEART RATE: 79 BPM | TEMPERATURE: 97.1 F | SYSTOLIC BLOOD PRESSURE: 100 MMHG | WEIGHT: 152 LBS | RESPIRATION RATE: 16 BRPM | DIASTOLIC BLOOD PRESSURE: 70 MMHG | OXYGEN SATURATION: 97 % | BODY MASS INDEX: 25.33 KG/M2 | HEIGHT: 65 IN

## 2025-04-15 DIAGNOSIS — M79.7 FIBROMYALGIA: ICD-10-CM

## 2025-04-15 DIAGNOSIS — R26.81 UNSTEADINESS ON FEET: ICD-10-CM

## 2025-04-15 DIAGNOSIS — M85.851 OTHER SPECIFIED DISORDERS OF BONE DENSITY AND STRUCTURE, RIGHT THIGH: ICD-10-CM

## 2025-04-15 PROCEDURE — 99215 OFFICE O/P EST HI 40 MIN: CPT

## 2025-04-15 PROCEDURE — G2211 COMPLEX E/M VISIT ADD ON: CPT | Mod: NC

## 2025-04-16 ENCOUNTER — LABORATORY RESULT (OUTPATIENT)
Age: 60
End: 2025-04-16

## 2025-04-16 ENCOUNTER — APPOINTMENT (OUTPATIENT)
Dept: PULMONOLOGY | Facility: CLINIC | Age: 60
End: 2025-04-16
Payer: COMMERCIAL

## 2025-04-16 VITALS
BODY MASS INDEX: 25.49 KG/M2 | WEIGHT: 153 LBS | HEIGHT: 65 IN | RESPIRATION RATE: 16 BRPM | SYSTOLIC BLOOD PRESSURE: 110 MMHG | TEMPERATURE: 97.4 F | HEART RATE: 77 BPM | DIASTOLIC BLOOD PRESSURE: 68 MMHG | OXYGEN SATURATION: 98 %

## 2025-04-16 DIAGNOSIS — J45.909 UNSPECIFIED ASTHMA, UNCOMPLICATED: ICD-10-CM

## 2025-04-16 PROBLEM — M85.851 OSTEOPENIA OF NECK OF RIGHT FEMUR: Status: ACTIVE | Noted: 2025-04-16

## 2025-04-16 PROCEDURE — 99213 OFFICE O/P EST LOW 20 MIN: CPT | Mod: 25

## 2025-04-16 PROCEDURE — 95012 NITRIC OXIDE EXP GAS DETER: CPT

## 2025-04-16 RX ORDER — ALBUTEROL SULFATE 90 UG/1
108 (90 BASE) INHALANT RESPIRATORY (INHALATION)
Qty: 1 | Refills: 4 | Status: ACTIVE | COMMUNITY
Start: 2025-04-16 | End: 1900-01-01

## 2025-04-16 NOTE — PROCEDURE
[FreeTextEntry1] : 4/16/2025   NIOX    19  ppb  ED visit  for    constipation /   pain constipation dx  had CT  CC: [id number] EXAM: CT ANGIO ABD PELV (W)AW IC ORDERED BY: MARCIA MOROCHO  ACC: [id number] EXAM: CT ANGIO CHEST AORTA WAWIC ORDERED BY: MARCIA MOROCHO  PROCEDURE DATE: 09/23/2024    INTERPRETATION: Clinical data: 59-year-old female with back pain. History of chronic back pain with multiple epidural injections  PROCEDURE: CT Angiography of the Chest, Abdomen and Pelvis. Precontrast imaging was performed through the chest followed by arterial phase imaging of the chest, abdomen and pelvis. Sagittal and coronal reformats were performed as well as 3D (MIP) reconstructions.  COMPARISON: CT 09/22/2024  FINDINGS: CHEST: LUNGS AND LARGE AIRWAYS: Patent central airways. No pulmonary nodules. PLEURA: Trace bilateral pleural effusions. VESSELS: No aortic dissection. No pulmonary embolus. HEART: Heart size is normal. No pericardial effusion. MEDIASTINUM AND MARLEN: No lymphadenopathy. CHEST WALL AND LOWER NECK: Within normal limits.  ABDOMEN AND PELVIS: LIVER: Within normal limits. BILE DUCTS: Normal caliber. GALLBLADDER: Within normal limits. SPLEEN: Within normal limits. PANCREAS: Within normal limits. ADRENALS: Within normal limits. KIDNEYS/URETERS: Within normal limits.  BLADDER: Within normal limits. REPRODUCTIVE ORGANS: Uterus and adnexa within normal limits.  BOWEL: No bowel obstruction. Appendix normal. Diffuse moderate colonic dilatation with semisolid contents. Moderate small bowel dilatation PERITONEUM/RETROPERITONEUM: Small ascites. VESSELS: Within normal limits. LYMPH NODES: No lymphadenopathy. ABDOMINAL WALL: Within normal limits. BONES: Degenerative change.  IMPRESSION: No aortic dissection. Diffuse moderate colonic dilatation and moderate small bowel dilatation without obstruction    --- End of Report ---      LEO GUNN MD; Attending Radiologist This document has been electronically signed. Sep 23 2024 [id number]/o female ex smoker BMI    PFT 3/21/2024 Good efforts Spirometry suggestive of normal FVC FEV1 and FEV1/FVC ratio There is no significant bronchodilator response FEF 25-75% is normal  MVV is reduced suggestive of normal SVC and ERV  impression Spirometry is normal although there is no significant bronchodilator response an increase in 75% is seen in FEF 75% post bronchodilator is seen -Reduced MVV maybe due to poor efforts or neuromuscular weakness recommend clinical correlation chart reviewed     allergy testing  2/2023  contact dermatitis    propolis iodopropynl gold  Na thioS  butylcarbonate     PROCEDURE DATE: Oct 5 2010 . INTERPRETATION: Clinical data: Hematuria and left flank pain. History pulmonary nodules. FINDINGS: Helical CT of the thorax, abdomen, and pelvis was performed from the level of the thoracic inlet to the symphysis pubis without intravenous contrast. Examination is compared to the prior chest CT of 09/30/2009 and the abdominal CT scan of 06/26/2008. There is no significant axillary, hilar, or mediastinal lymphadenopathy. The heart is normal in size. There is mild pericardial thickening unchanged. No pleural effusions are noted. Multiple small pulmonary nodules are unchanged from 07/10/2008. These include bilateral intrapulmonary lymph nodes as well as a 4 mm left lower lobe nodule, series 3 image 96, a 3 mm superior segment right lower lobe nodule, image 49, and a 2 mm superior left lower lobe nodule, image 52. Central airways are patent. Abdomen and pelvis: No urinary tract calculus or hydronephrosis is visualized. There is no evident abnormality of the urinary bladder. The uterus is mildly enlarged. No bowel obstruction is demonstrated. The appendix is normal aside for presence of retained contrast or an appendicolith. Noncontrast views of the upper abdomen reveal no abnormality of the liver, spleen, kidneys, adrenal glands, pancreas, gallbladder, or biliary tree. There is no significant retroperitoneal lymphadenopathy. Skeletal structures are unremarkable. IMPRESSION: Multiple pulmonary nodules unchanged from 07/10/2008 which require no further evaluation. Cause of left flank pain and hematuria not identified LEO GUNN M.D., ATTENDING RADIOLOGIST This examination was interpreted on:     {orig_read_dtime             \} This document has been electronically signed. Oct 5 2010 4:57PM

## 2025-04-16 NOTE — PHYSICAL EXAM
[General Appearance - Alert] : alert [General Appearance - In No Acute Distress] : in no acute distress [General Appearance - Well Nourished] : well nourished [Sclera] : the sclera and conjunctiva were normal [PERRL With Normal Accommodation] : pupils were equal in size, round, and reactive to light [Extraocular Movements] : extraocular movements were intact [Outer Ear] : the ears and nose were normal in appearance [Neck Appearance] : the appearance of the neck was normal [Respiration, Rhythm And Depth] : normal respiratory rhythm and effort [Edema] : there was no peripheral edema [No CVA Tenderness] : no ~M costovertebral angle tenderness [No Spinal Tenderness] : no spinal tenderness [Nail Clubbing] : no clubbing  or cyanosis of the fingernails [Musculoskeletal - Swelling] : no joint swelling seen [Motor Tone] : muscle strength and tone were normal [] : no rash [Motor Exam] : the motor exam was normal [No Focal Deficits] : no focal deficits [Oriented To Time, Place, And Person] : oriented to person, place, and time [FreeTextEntry1] : Strength intact but gait slightly off balance and tentative - stable but persistent

## 2025-04-16 NOTE — PHYSICAL EXAM
From: Linda De Santiago  To: Francisco Ross  Sent: 2/1/2021 9:52 AM CST  Subject: Other    My blood pressure readings   [III] : Mallampati Class: III [Normal Appearance] : normal appearance [Supple] : supple [No JVD] : no jvd [No Neck Mass] : no neck mass [Normal Rate/Rhythm] : normal rate/rhythm [Normal S1, S2] : normal s1, s2 [No Murmurs] : no murmurs [No Resp Distress] : no resp distress [No Acc Muscle Use] : no acc muscle use [Clear to Auscultation Bilaterally] : clear to auscultation bilaterally [Benign] : benign [Not Tender] : not tender [No Masses] : no masses [Soft] : soft [Normal Bowel Sounds] : normal bowel sounds [No Hernias] : no hernias [Normal Gait] : normal gait [No Edema] : no edema [No Rash] : no rash [No Motor Deficits] : no motor deficits [Normal Affect] : normal affect [TextBox_2] : pleasant f no distress  no cough no sob  [TextBox_11] : no lesion  moist

## 2025-04-16 NOTE — ASSESSMENT
[FreeTextEntry1] : KEANU KLEIN is a 59 year old woman with prior dx of FMS which I agree with given sx as above and exam with diffuse soft tissue tender points, skin hypersensitivity, appearing fatigued. Pt also with mild dry eyes and dry mouth chronically, slightly imbalanced gait and with hx of issues with balance and falls. Remains with paucity of inflammatory arthritis or CTD sx. Responsive to low dose prednisone for markedly worse FMS sx but other medical issues making it not an ideal long term med. Did not tolerate trial of Savella. Nerve blocks are helping partially, also now established with psych who is helping to guide medication.   # FMS  - c/w duloxetine 50mg - dosing from psych, no contraindication to tapering down if needed. Advised to discuss possible other possibly non-med options IF feasible if SE with meds. Most recent psych note reviewed  - c/w Lyrica - she is using it variably between 1-3x /day PRN which she feels is working well - doesn't need renewal today.  - c/w Flexeril QHS, can use up to TID PRN severe pain  - trial of LDN didn't help, now off but will reconsider if worsening HA as felt it improved that somewhat  - c/w low dose prednisone PRN sparingly severe flares - reports she hasn't had to use  - I remain on board with any changes/input from psychiatry as I suspect this is a concomitant problem.  - c/w stretching, light aerobic exercises, massage, heat as adjuncts for FMS pain.  - c/w nonpharmacologic FMS therapies as well - encouraged to incorporate small activities that she finds beneficial during her day, optimize stretching and light exercise, and be mindful of mood, be aware not to overextend on days she has less pain, ensure adequate rest between strenuous activities.  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

## 2025-04-16 NOTE — ASSESSMENT
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

## 2025-04-16 NOTE — HISTORY OF PRESENT ILLNESS
[FreeTextEntry1] : KEANU KLEIN is a 56 year old woman who presents with hx of FMS, here for transition rheumatologic care, previously following with Dr Jin. Symptoms include chronic headaches, persistent fatigue, gait instability, frequent falls, forgetfulness, diffuse arthralgias, myalgias, skin hypersensitivity, depression/anxiety/panic attacks. Symptoms remain fairly active despite current regimen of medications but she reports she is markedly improved from prior to being on this regimen. Adjunctive measures and other related sx as below --   + swimming daily which helps -- notes cold temperatures are very beneficial for her  + Trigger injections, chiropractor helping -- Mikhail Pal + low pressure ?glaucoma and dry eyes -- getting plugs, on Timoptic, Genteal with moderate improvement  + chest pain, cardiac w/u negative, likely ?panic attacks + no periods since ablation in 2014 -- ?due for DEXA with GYN, reports last one was normal  + chronic bloating and nonspecific GI sx, reports last colonoscopy normal but does have bouts of diverticulitis  + Has been on disability x 1 year due to inability to work 2/2 above + 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 APLS ROS -  2 uncomplicated pregnancies, 2 miscarriage, no thrombotic events.    Inflammatory arthritis ROS negative for symmetrical peripheral joint synovitis, prolonged AM stiffness, enthesitis, dactylitis, psoriasis/ rashes, eye inflammation, inflammatory low back pain, IBD.   Prior/failed meds -- Cymbalta 2/2 SE, gabapentin but not sure why it was stopped  ------- 9/23/21 -- Worsening FMS sx recently in setting of increased emotional and physical stress -- having to clean out flooded basement mainly herself. Ongoing CP, full cardiac w/u negative, likely FMS related as well. Current meds are partially helping but reluctant to increase 2/2 sedation.   10/29/21 -- Prednisone helped, but does not want to be on it chronically, no SE. Remainder of meds are maintaining her at a pain level of 6 at best. More frequent migraines, getting MRI brain today. Also with poorly managed glaucoma, following closely with ophthalmology.   12/23/21 -- 2 weeks ago was raking, then developed diffuse vesicular rash with pruritus, now vesicles resolved s/p steroids but ongoing pruritus, also accidentally had wheat intake around same time, is + celiacs. Improved upper body pain s/p nerve blocks, less migraines but + tension HA, overall feels FMS is stable with some slight improvement with the above.   2/11/22 -- Did not tolerated trial of Savella 2/2 worsening mood and muscle spasm, now off and started on low dose duloxetine with improvement in SE and no new ones, has upcoming appointment with Dr Dixon Guzmán next week. Reports previously she had improvement with nerve blocks but insurance no longer covering them. Remains very distressed about her weight gain despite adhering to strict low calorie, healthy diet.   4/15/22 -- Currently on Cymbalta 40mg daily with improvement in sx. Feels like pain has been less severe and she has been able to be more active somewhat. She still has a lot of small joint symmetrical pain, however no synovitis and she is asking could she have concomitant RA. Inflammatory arthritis ROS negative for symmetrical peripheral joint synovitis, prolonged AM stiffness, enthesitis, dactylitis, psoriasis/ rashes, eye inflammation, inflammatory low back pain, IBD.   6/23/22 -- Self discontinued Lyrica recently 2/2 acute onset b/l LE edema which has now resolved but pain is returning. Cymbalta slightly increased to 50mg/day, she is tolerating well but some sedation. Overall continues to feel better than prior to this regimen and is open to resuming Lyrica. Recent scattered pustular rash, thought it was posion ivy but scattered lesions all over body, first a few days after being in garden, and in same location as prior pustular rash, no active lesions today, on topical and PO steroids with PMD. No other sx today.   9/6/22 -- Back up to TID lyrica and currently feels FMS related sx stable, discussed increase in dose of Cymbalta with shala but she declined 2/2 her unintentional weight gain despite exercising and reported low PO intake. Has episodes where she gets very sweaty and fatigued, not similar to her prior hot flashes. Ongoing itchy pustular rash diffusely, s/p derm bx, not in clear contact pattern. Will be getting PARK upcoming.   11/11/22 -- Recently has been having more HA. Recently saw functional medicine, provided with a diet, Synthroid dose was also recently changed. Currently on Lyrica BID and Cymbalta 60mg and this is helping. S/p PARK, so far thinks its helping. Prior rashes felt to be an Id reaction, she is on antihistamines.   3/17/23 -- Worsening fatigue, a few days of improvement with B12 injections but then worsens again, has lost some weight since starting B12, feels generally more irritable too, reports sleep remains good but "I could just keep sleeping." PAKR didn't help much, its been recommended to have 1 more, she is thinking about it. No further rashes, did see Allergy.   6/27/23 -- C spine improved with PARK, ongoing T/L spine pain, trigger points and massage remain only partially helpful, has been trying to get MRI with pain mgmt in anticipation for possible PARK, but they want her to go to an MRI place that she can't drive to. FMS pain not worsening, current meds are helping.   9/26/23 -- Recent mild covid infection, self resolved, FMS overall stable, notes less pain but more fatigue since getting sick, some pleuritic mild pain, + dry cough. Multiple recent falls, can't verbalize what is causing them, does have callus on her R foot which may be limiting sensation.   2/20/24 -- Worsening spasm, chiropractor wants her to try the muscle relaxer TID but too sedating, already only using PRN and also getting benefit but SE of dizziness from Lyrica so careful with only taking these when home for the evening. Remains with marked difficulty with ADLs and IADLs. Prior chest sx have resolved. Inflammatory ROS negative.   4/23/24 -- Overall stable from last visit, spasm has improved with conservative measures, still with severe fatigue and limited ADLs/IADLs. Inflammatory ROS negative.   8/6/24 -- In May developed severe bruising but no bleeding, self resolved with stopping ASA, no clear etiology. Developed severe heat exposure sx in August - severe brain fog, hard to walk, couldn't drive for a time, now has resolved. MRI brain checked with pain mgmt - normal, will be started on LDN with Dr Lyons. Slowly reintroducing her meds as stopped a lot when feeling unwell. Asking if needs to see neuro. Inflammatory ROS negative.   12/10/24 -- Overall feeling better since Sept when had to be in hospital for 2 days for severe L sided back pain, no clear etiology, took about 1 week to fully resolve, used all meds to full doses which partially helped. Inflammatory ROS negative.   4/15/25 -- More diffuse arthralgia as new chiropractor who isn't able to adjust her as well, other aspects of care from that office are still helping so planning to continue there. More active mood given more pain, flared recently too when tried to do yoga for >15 min including sciatica but has since resolved with stretching, pt continues to try to be active in <15 min intervals and stretch. Psych and her discussed tapering back Cymbalta 2/2 sedation but she isn't sure. No acute issues currently, no recurrence of prior episode but did have 1 further fall, negative inflammatory ROS.

## 2025-04-16 NOTE — HISTORY OF PRESENT ILLNESS
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   --    PPD negative here for cough   -has fibromyalgia   chronic  pain on Cymbalta   doing well  on disability   due to this weight loss since dose changes  - asthmatic bronchitis -  when sick or cats  gets wheezing   -cough with singing at times  laughing       or sick  -per MD notes  cough   persisted after infection and flovent was added   2020ct  abd  compared to  2010    chart reviewed  ct 2010  lung stable since  2008  no further  ct  -LLL  stable  tiny nodule  -last cxr 1/2024  no acute dz -seen by allergist  + contact dermatitis    propolis iodopropynl gold  Na thioS  butylcarbonate  - 4/18/2024 57y/o  female  ex smoker   h/o thyroid cancer    --  asthma   allergic    - currently   some   allergies -  pear tree and now some mucous - albuterol  prn only   - PFT reviewed with patient     10/11/2024 57y/o female ex smoker  h/o thryoid cancer fibromyalgia followed by rheum  asthma allergies   pollen trigger -has pets thinks not trigger - albuterol prn  - no chest pain no sob -  4/16/2025 58y/o  female ex smoker  h/o  thyroid  cancer  fibromyalgia   followed   by rheum asthma  allergies   asthma - had  dust exposure      had to use  her nedi pot - followed by rheum  - Niox   19  ppb  doing well  on albuterol MDI prn only  -  [TextBox_11] : 1 [TextBox_13] : 16 [YearQuit] : 1999

## 2025-04-16 NOTE — PHYSICAL EXAM
[III] : Mallampati Class: III [Normal Appearance] : normal appearance [Supple] : supple [No JVD] : no jvd [No Neck Mass] : no neck mass [Normal Rate/Rhythm] : normal rate/rhythm [Normal S1, S2] : normal s1, s2 [No Murmurs] : no murmurs [No Resp Distress] : no resp distress [No Acc Muscle Use] : no acc muscle use [Clear to Auscultation Bilaterally] : clear to auscultation bilaterally [Benign] : benign [Not Tender] : not tender [No Masses] : no masses [Soft] : soft [No Hernias] : no hernias [Normal Bowel Sounds] : normal bowel sounds [Normal Gait] : normal gait [No Edema] : no edema [No Rash] : no rash [No Motor Deficits] : no motor deficits [Normal Affect] : normal affect [TextBox_2] : pleasant f no distress  no cough no sob  [TextBox_11] : no lesion  moist

## 2025-04-16 NOTE — HISTORY OF PRESENT ILLNESS
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   --    PPD negative here for cough   -has fibromyalgia   chronic  pain on Cymbalta   doing well  on disability   due to this weight loss since dose changes  - asthmatic bronchitis -  when sick or cats  gets wheezing   -cough with singing at times  laughing       or sick  -per MD notes  cough   persisted after infection and flovent was added   2020ct  abd  compared to  2010    chart reviewed  ct 2010  lung stable since  2008  no further  ct  -LLL  stable  tiny nodule  -last cxr 1/2024  no acute dz -seen by allergist  + contact dermatitis    propolis iodopropynl gold  Na thioS  butylcarbonate  - 4/18/2024 57y/o  female  ex smoker   h/o thyroid cancer    --  asthma   allergic    - currently   some   allergies -  pear tree and now some mucous - albuterol  prn only   - PFT reviewed with patient     10/11/2024 57y/o female ex smoker  h/o thryoid cancer fibromyalgia followed by rheum  asthma allergies   pollen trigger -has pets thinks not trigger - albuterol prn  - no chest pain no sob -  4/16/2025 60y/o  female ex smoker  h/o  thyroid  cancer  fibromyalgia   followed   by rheum asthma  allergies   asthma - had  dust exposure      had to use  her nedi pot - followed by rheum  - Niox   19  ppb  doing well  on albuterol MDI prn only  -  [TextBox_13] : 16 [TextBox_11] : 1 [YearQuit] : 1999

## 2025-04-16 NOTE — REVIEW OF SYSTEMS
[Recent Wt Loss (___ Lbs)] : ~T recent [unfilled] lb weight loss [Postnasal Drip] : postnasal drip [Seasonal Allergies] : seasonal allergies [Chronic Pain] : chronic pain [Thyroid Problem] : thyroid problem [Fever] : no fever [Fatigue] : no fatigue [Recent Wt Gain (___ Lbs)] : ~T no recent weight gain [Chills] : no chills [Dry Eyes] : no dry eyes [Sore Throat] : no sore throat [Dry Mouth] : no dry mouth [Sinus Problems] : no sinus problems [Hemoptysis] : no hemoptysis [Cough] : no cough [Chest Tightness] : no chest tightness [Sputum] : no sputum [Dyspnea] : no dyspnea [Wheezing] : no wheezing [Chest Discomfort] : no chest discomfort [Palpitations] : no palpitations [GERD] : no gerd [Abdominal Pain] : no abdominal pain [Nausea] : no nausea [Vomiting] : no vomiting [Dysphagia] : no dysphagia [Bleeding] : no bleeding [Rash] : no rash [Blood Transfusion] : no blood transfusion [Clotting Disorder/ Frequent bleeding] : no clotting disorder/ frequent bleeding [Diabetes] : no diabetes [Obesity] : no obesity [TextBox_3] : 25 pounds     loss

## 2025-04-16 NOTE — REVIEW OF SYSTEMS
[Recent Wt Loss (___ Lbs)] : ~T recent [unfilled] lb weight loss [Postnasal Drip] : postnasal drip [Seasonal Allergies] : seasonal allergies [Chronic Pain] : chronic pain [Thyroid Problem] : thyroid problem [Fever] : no fever [Fatigue] : no fatigue [Recent Wt Gain (___ Lbs)] : ~T no recent weight gain [Chills] : no chills [Dry Eyes] : no dry eyes [Sore Throat] : no sore throat [Dry Mouth] : no dry mouth [Sinus Problems] : no sinus problems [Cough] : no cough [Hemoptysis] : no hemoptysis [Chest Tightness] : no chest tightness [Sputum] : no sputum [Dyspnea] : no dyspnea [Wheezing] : no wheezing [Chest Discomfort] : no chest discomfort [Palpitations] : no palpitations [GERD] : no gerd [Abdominal Pain] : no abdominal pain [Nausea] : no nausea [Vomiting] : no vomiting [Dysphagia] : no dysphagia [Bleeding] : no bleeding [Rash] : no rash [Blood Transfusion] : no blood transfusion [Clotting Disorder/ Frequent bleeding] : no clotting disorder/ frequent bleeding [Diabetes] : no diabetes [Obesity] : no obesity [TextBox_3] : 25 pounds     loss

## 2025-04-16 NOTE — HISTORY OF PRESENT ILLNESS
[FreeTextEntry1] : KEANU KLEIN is a 56 year old woman who presents with hx of FMS, here for transition rheumatologic care, previously following with Dr Jin. Symptoms include chronic headaches, persistent fatigue, gait instability, frequent falls, forgetfulness, diffuse arthralgias, myalgias, skin hypersensitivity, depression/anxiety/panic attacks. Symptoms remain fairly active despite current regimen of medications but she reports she is markedly improved from prior to being on this regimen. Adjunctive measures and other related sx as below --   + swimming daily which helps -- notes cold temperatures are very beneficial for her  + Trigger injections, chiropractor helping -- Mikhail Pal + low pressure ?glaucoma and dry eyes -- getting plugs, on Timoptic, Genteal with moderate improvement  + chest pain, cardiac w/u negative, likely ?panic attacks + no periods since ablation in 2014 -- ?due for DEXA with GYN, reports last one was normal  + chronic bloating and nonspecific GI sx, reports last colonoscopy normal but does have bouts of diverticulitis  + Has been on disability x 1 year due to inability to work 2/2 above + 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 APLS ROS -  2 uncomplicated pregnancies, 2 miscarriage, no thrombotic events.    Inflammatory arthritis ROS negative for symmetrical peripheral joint synovitis, prolonged AM stiffness, enthesitis, dactylitis, psoriasis/ rashes, eye inflammation, inflammatory low back pain, IBD.   Prior/failed meds -- Cymbalta 2/2 SE, gabapentin but not sure why it was stopped  ------- 9/23/21 -- Worsening FMS sx recently in setting of increased emotional and physical stress -- having to clean out flooded basement mainly herself. Ongoing CP, full cardiac w/u negative, likely FMS related as well. Current meds are partially helping but reluctant to increase 2/2 sedation.   10/29/21 -- Prednisone helped, but does not want to be on it chronically, no SE. Remainder of meds are maintaining her at a pain level of 6 at best. More frequent migraines, getting MRI brain today. Also with poorly managed glaucoma, following closely with ophthalmology.   12/23/21 -- 2 weeks ago was raking, then developed diffuse vesicular rash with pruritus, now vesicles resolved s/p steroids but ongoing pruritus, also accidentally had wheat intake around same time, is + celiacs. Improved upper body pain s/p nerve blocks, less migraines but + tension HA, overall feels FMS is stable with some slight improvement with the above.   2/11/22 -- Did not tolerated trial of Savella 2/2 worsening mood and muscle spasm, now off and started on low dose duloxetine with improvement in SE and no new ones, has upcoming appointment with Dr Dixon Guzmán next week. Reports previously she had improvement with nerve blocks but insurance no longer covering them. Remains very distressed about her weight gain despite adhering to strict low calorie, healthy diet.   4/15/22 -- Currently on Cymbalta 40mg daily with improvement in sx. Feels like pain has been less severe and she has been able to be more active somewhat. She still has a lot of small joint symmetrical pain, however no synovitis and she is asking could she have concomitant RA. Inflammatory arthritis ROS negative for symmetrical peripheral joint synovitis, prolonged AM stiffness, enthesitis, dactylitis, psoriasis/ rashes, eye inflammation, inflammatory low back pain, IBD.   6/23/22 -- Self discontinued Lyrica recently 2/2 acute onset b/l LE edema which has now resolved but pain is returning. Cymbalta slightly increased to 50mg/day, she is tolerating well but some sedation. Overall continues to feel better than prior to this regimen and is open to resuming Lyrica. Recent scattered pustular rash, thought it was posion ivy but scattered lesions all over body, first a few days after being in garden, and in same location as prior pustular rash, no active lesions today, on topical and PO steroids with PMD. No other sx today.   9/6/22 -- Back up to TID lyrica and currently feels FMS related sx stable, discussed increase in dose of Cymbalta with shala but she declined 2/2 her unintentional weight gain despite exercising and reported low PO intake. Has episodes where she gets very sweaty and fatigued, not similar to her prior hot flashes. Ongoing itchy pustular rash diffusely, s/p derm bx, not in clear contact pattern. Will be getting PARK upcoming.   11/11/22 -- Recently has been having more HA. Recently saw functional medicine, provided with a diet, Synthroid dose was also recently changed. Currently on Lyrica BID and Cymbalta 60mg and this is helping. S/p PARK, so far thinks its helping. Prior rashes felt to be an Id reaction, she is on antihistamines.   3/17/23 -- Worsening fatigue, a few days of improvement with B12 injections but then worsens again, has lost some weight since starting B12, feels generally more irritable too, reports sleep remains good but "I could just keep sleeping." PARK didn't help much, its been recommended to have 1 more, she is thinking about it. No further rashes, did see Allergy.   6/27/23 -- C spine improved with PARK, ongoing T/L spine pain, trigger points and massage remain only partially helpful, has been trying to get MRI with pain mgmt in anticipation for possible PARK, but they want her to go to an MRI place that she can't drive to. FMS pain not worsening, current meds are helping.   9/26/23 -- Recent mild covid infection, self resolved, FMS overall stable, notes less pain but more fatigue since getting sick, some pleuritic mild pain, + dry cough. Multiple recent falls, can't verbalize what is causing them, does have callus on her R foot which may be limiting sensation.   2/20/24 -- Worsening spasm, chiropractor wants her to try the muscle relaxer TID but too sedating, already only using PRN and also getting benefit but SE of dizziness from Lyrica so careful with only taking these when home for the evening. Remains with marked difficulty with ADLs and IADLs. Prior chest sx have resolved. Inflammatory ROS negative.   4/23/24 -- Overall stable from last visit, spasm has improved with conservative measures, still with severe fatigue and limited ADLs/IADLs. Inflammatory ROS negative.   8/6/24 -- In May developed severe bruising but no bleeding, self resolved with stopping ASA, no clear etiology. Developed severe heat exposure sx in August - severe brain fog, hard to walk, couldn't drive for a time, now has resolved. MRI brain checked with pain mgmt - normal, will be started on LDN with Dr Lyons. Slowly reintroducing her meds as stopped a lot when feeling unwell. Asking if needs to see neuro. Inflammatory ROS negative.   12/10/24 -- Overall feeling better since Sept when had to be in hospital for 2 days for severe L sided back pain, no clear etiology, took about 1 week to fully resolve, used all meds to full doses which partially helped. Inflammatory ROS negative.   4/15/25 -- More diffuse arthralgia as new chiropractor who isn't able to adjust her as well, other aspects of care from that office are still helping so planning to continue there. More active mood given more pain, flared recently too when tried to do yoga for >15 min including sciatica but has since resolved with stretching, pt continues to try to be active in <15 min intervals and stretch. Psych and her discussed tapering back Cymbalta 2/2 sedation but she isn't sure. No acute issues currently, no recurrence of prior episode but did have 1 further fall, negative inflammatory ROS.

## 2025-04-16 NOTE — PROCEDURE
[FreeTextEntry1] : 4/16/2025   NIOX    19  ppb  ED visit  for    constipation /   pain constipation dx  had CT  CC: [id number] EXAM: CT ANGIO ABD PELV (W)AW IC ORDERED BY: MARCIA MOROCHO  ACC: [id number] EXAM: CT ANGIO CHEST AORTA WAWIC ORDERED BY: MARCIA MOROCHO  PROCEDURE DATE: 09/23/2024    INTERPRETATION: Clinical data: 59-year-old female with back pain. History of chronic back pain with multiple epidural injections  PROCEDURE: CT Angiography of the Chest, Abdomen and Pelvis. Precontrast imaging was performed through the chest followed by arterial phase imaging of the chest, abdomen and pelvis. Sagittal and coronal reformats were performed as well as 3D (MIP) reconstructions.  COMPARISON: CT 09/22/2024  FINDINGS: CHEST: LUNGS AND LARGE AIRWAYS: Patent central airways. No pulmonary nodules. PLEURA: Trace bilateral pleural effusions. VESSELS: No aortic dissection. No pulmonary embolus. HEART: Heart size is normal. No pericardial effusion. MEDIASTINUM AND MARLEN: No lymphadenopathy. CHEST WALL AND LOWER NECK: Within normal limits.  ABDOMEN AND PELVIS: LIVER: Within normal limits. BILE DUCTS: Normal caliber. GALLBLADDER: Within normal limits. SPLEEN: Within normal limits. PANCREAS: Within normal limits. ADRENALS: Within normal limits. KIDNEYS/URETERS: Within normal limits.  BLADDER: Within normal limits. REPRODUCTIVE ORGANS: Uterus and adnexa within normal limits.  BOWEL: No bowel obstruction. Appendix normal. Diffuse moderate colonic dilatation with semisolid contents. Moderate small bowel dilatation PERITONEUM/RETROPERITONEUM: Small ascites. VESSELS: Within normal limits. LYMPH NODES: No lymphadenopathy. ABDOMINAL WALL: Within normal limits. BONES: Degenerative change.  IMPRESSION: No aortic dissection. Diffuse moderate colonic dilatation and moderate small bowel dilatation without obstruction    --- End of Report ---      LEO GUNN MD; Attending Radiologist This document has been electronically signed. Sep 23 2024 [id number] female ex smoker BMI    PFT 3/21/2024 Good efforts Spirometry suggestive of normal FVC FEV1 and FEV1/FVC ratio There is no significant bronchodilator response FEF 25-75% is normal  MVV is reduced suggestive of normal SVC and ERV  impression Spirometry is normal although there is no significant bronchodilator response an increase in 75% is seen in FEF 75% post bronchodilator is seen -Reduced MVV maybe due to poor efforts or neuromuscular weakness recommend clinical correlation chart reviewed     allergy testing  2/2023  contact dermatitis    propolis iodopropynl gold  Na thioS  butylcarbonate     PROCEDURE DATE: Oct 5 2010 . INTERPRETATION: Clinical data: Hematuria and left flank pain. History pulmonary nodules. FINDINGS: Helical CT of the thorax, abdomen, and pelvis was performed from the level of the thoracic inlet to the symphysis pubis without intravenous contrast. Examination is compared to the prior chest CT of 09/30/2009 and the abdominal CT scan of 06/26/2008. There is no significant axillary, hilar, or mediastinal lymphadenopathy. The heart is normal in size. There is mild pericardial thickening unchanged. No pleural effusions are noted. Multiple small pulmonary nodules are unchanged from 07/10/2008. These include bilateral intrapulmonary lymph nodes as well as a 4 mm left lower lobe nodule, series 3 image 96, a 3 mm superior segment right lower lobe nodule, image 49, and a 2 mm superior left lower lobe nodule, image 52. Central airways are patent. Abdomen and pelvis: No urinary tract calculus or hydronephrosis is visualized. There is no evident abnormality of the urinary bladder. The uterus is mildly enlarged. No bowel obstruction is demonstrated. The appendix is normal aside for presence of retained contrast or an appendicolith. Noncontrast views of the upper abdomen reveal no abnormality of the liver, spleen, kidneys, adrenal glands, pancreas, gallbladder, or biliary tree. There is no significant retroperitoneal lymphadenopathy. Skeletal structures are unremarkable. IMPRESSION: Multiple pulmonary nodules unchanged from 07/10/2008 which require no further evaluation. Cause of left flank pain and hematuria not identified LEO GUNN M.D., ATTENDING RADIOLOGIST This examination was interpreted on:     {orig_read_dtime             \} This document has been electronically signed. Oct 5 2010 4:57PM

## 2025-04-24 ENCOUNTER — APPOINTMENT (OUTPATIENT)
Dept: PSYCHIATRY | Facility: CLINIC | Age: 60
End: 2025-04-24
Payer: COMMERCIAL

## 2025-04-24 DIAGNOSIS — F33.1 MAJOR DEPRESSIVE DISORDER, RECURRENT, MODERATE: ICD-10-CM

## 2025-04-24 DIAGNOSIS — R68.89 OTHER GENERAL SYMPTOMS AND SIGNS: ICD-10-CM

## 2025-04-24 PROCEDURE — 99214 OFFICE O/P EST MOD 30 MIN: CPT

## 2025-04-24 NOTE — HISTORY OF PRESENT ILLNESS
[FreeTextEntry1] : Pt reports she struggles to get started on a project, feels she procrastinates and struggles to get things done.  Pt does not feel "on top of things".  Pt had delayed her paperwork for disability, wants to shower and make dinner, and finds she loses time. Has decreased visiting her mother, at times this is related to pain issues, and discussed pacing herself with episodes of excess activity when she feels well and then has to rest afterwards for 2 days. Pt mother is cognitively impaired. Pt reports that she followed up with pulmonary, no intervention needed for long standing lung nodules. Pt has a loop recorder in but may need it removed, discussed potential of having TMS per pt her sister had this.

## 2025-04-24 NOTE — PHYSICAL EXAM
[Slowed] : slowed [Cooperative] : cooperative [Anxious] : anxious [Full] : full [Clear] : clear [Linear/Goal Directed] : linear/goal directed [None] : none [None Reported] : none reported [Memory] : memory [Average] : average [WNL] : within normal limits [FreeTextEntry2] : chronic back pain.  [FreeTextEntry1] : adequately groomed and dressed.  [de-identified] : short term memory and reports word finding difficulty and difficulty with sentence completion (not noted today during interview)

## 2025-04-24 NOTE — PLAN
[No] : No [Medication education provided] : Medication education provided. [Rationale for medication choices, possible risks/precautions, benefits, alternative treatment choices, and consequences of non-treatment discussed] : Rationale for medication choices, possible risks/precautions, benefits, alternative treatment choices, and consequences of non-treatment discussed with patient/family/caregiver  [FreeTextEntry4] : Discussed mood symptoms and if it will help decrease fatigue-to change Cymbalta dose, pt tends to feel better in the spring.  Pt states with yoga, she had sciatica recur. Pt reports she has not been able to see her chiropractor who is out sick, she states his interventions help with neck and back pain. Per pt more symptomatic in terms of Fibromyalgia.  [FreeTextEntry5] : Will continue Duloxetine 50 mg total dose.  Consider Modafinil vs low dose Ritalin.  Pt will forward neuropsych testing previously done in 2021.

## 2025-05-08 ENCOUNTER — NON-APPOINTMENT (OUTPATIENT)
Age: 60
End: 2025-05-08

## 2025-05-08 ENCOUNTER — APPOINTMENT (OUTPATIENT)
Dept: ELECTROPHYSIOLOGY | Facility: CLINIC | Age: 60
End: 2025-05-08
Payer: COMMERCIAL

## 2025-05-08 PROCEDURE — 93298 REM INTERROG DEV EVAL SCRMS: CPT

## 2025-06-05 ENCOUNTER — APPOINTMENT (OUTPATIENT)
Dept: PSYCHIATRY | Facility: CLINIC | Age: 60
End: 2025-06-05
Payer: COMMERCIAL

## 2025-06-05 DIAGNOSIS — F33.1 MAJOR DEPRESSIVE DISORDER, RECURRENT, MODERATE: ICD-10-CM

## 2025-06-05 DIAGNOSIS — F43.10 POST-TRAUMATIC STRESS DISORDER, UNSPECIFIED: ICD-10-CM

## 2025-06-05 PROCEDURE — 99214 OFFICE O/P EST MOD 30 MIN: CPT

## 2025-06-05 RX ORDER — BUPROPION HYDROCHLORIDE 75 MG/1
75 TABLET, FILM COATED ORAL DAILY
Qty: 30 | Refills: 0 | Status: ACTIVE | COMMUNITY
Start: 2025-06-05 | End: 1900-01-01

## 2025-06-09 ENCOUNTER — APPOINTMENT (OUTPATIENT)
Dept: FAMILY MEDICINE | Facility: CLINIC | Age: 60
End: 2025-06-09
Payer: COMMERCIAL

## 2025-06-09 VITALS
OXYGEN SATURATION: 96 % | HEIGHT: 65 IN | HEART RATE: 74 BPM | TEMPERATURE: 98.7 F | DIASTOLIC BLOOD PRESSURE: 81 MMHG | RESPIRATION RATE: 16 BRPM | SYSTOLIC BLOOD PRESSURE: 124 MMHG | BODY MASS INDEX: 26.33 KG/M2 | WEIGHT: 158 LBS

## 2025-06-09 PROCEDURE — 99396 PREV VISIT EST AGE 40-64: CPT

## 2025-06-10 ENCOUNTER — LABORATORY RESULT (OUTPATIENT)
Age: 60
End: 2025-06-10

## 2025-06-10 PROBLEM — R10.11 COLICKY RIGHT UPPER QUADRANT PAIN: Status: ACTIVE | Noted: 2025-06-09

## 2025-06-10 LAB
ALBUMIN SERPL ELPH-MCNC: 4.3 G/DL
ALP BLD-CCNC: 107 U/L
ALT SERPL-CCNC: 20 U/L
ANION GAP SERPL CALC-SCNC: 14 MMOL/L
AST SERPL-CCNC: 22 U/L
BILIRUB SERPL-MCNC: 0.5 MG/DL
BUN SERPL-MCNC: 14 MG/DL
CALCIUM SERPL-MCNC: 9.8 MG/DL
CHLORIDE SERPL-SCNC: 105 MMOL/L
CHOLEST SERPL-MCNC: 209 MG/DL
CO2 SERPL-SCNC: 25 MMOL/L
CREAT SERPL-MCNC: 1.12 MG/DL
EGFRCR SERPLBLD CKD-EPI 2021: 57 ML/MIN/1.73M2
ESTIMATED AVERAGE GLUCOSE: 114 MG/DL
FOLATE SERPL-MCNC: 3.9 NG/ML
GLUCOSE SERPL-MCNC: 103 MG/DL
HBA1C MFR BLD HPLC: 5.6 %
HBV CORE IGG+IGM SER QL: NONREACTIVE
HBV SURFACE AB SER QL: REACTIVE
HBV SURFACE AG SER QL: NONREACTIVE
HCT VFR BLD CALC: 41.2 %
HCV AB SER QL: NONREACTIVE
HCV S/CO RATIO: 0.4 S/CO
HDLC SERPL-MCNC: 56 MG/DL
HGB BLD-MCNC: 13 G/DL
LDLC SERPL-MCNC: 133 MG/DL
MCHC RBC-ENTMCNC: 30 PG
MCHC RBC-ENTMCNC: 31.6 G/DL
MCV RBC AUTO: 94.9 FL
NONHDLC SERPL-MCNC: 153 MG/DL
PLATELET # BLD AUTO: 281 K/UL
POTASSIUM SERPL-SCNC: 4.7 MMOL/L
PROT SERPL-MCNC: 6.8 G/DL
RBC # BLD: 4.34 M/UL
RBC # FLD: 14.2 %
SODIUM SERPL-SCNC: 143 MMOL/L
T3FREE SERPL-MCNC: 1.89 PG/ML
T3RU NFR SERPL: 1.1 TBI
T4 FREE SERPL-MCNC: 0.7 NG/DL
T4 SERPL-MCNC: 5 UG/DL
TRIGL SERPL-MCNC: 117 MG/DL
TSH SERPL-ACNC: 40 UIU/ML
VIT B12 SERPL-MCNC: 987 PG/ML
WBC # FLD AUTO: 4.93 K/UL

## 2025-06-10 NOTE — HISTORY OF PRESENT ILLNESS
[de-identified] : 59-year-old female presenting for annual health maintenance and physical examination. Patient reports weight gain, increased cholesterol, and concerns about thyroid function. She also mentions recent severe pain episode requiring hospital visit. She reports being more mobile and active than in the past four years. However, she has experienced weight gain and increased cholesterol levels. She is not currently on a statin. The patient recently changed her levothyroxine medication from tablets to a liquid form due to concerns about gluten in the tablets. She reports improvement in her gastric symptoms since the switch. Henrietta expresses significant distress about her recent weight gain, stating she feels unlike herself and has difficulty leaving the house due to her appearance. She mentions having pain in her upper abdomen after eating, lasting for 1-2 hours, describing it as a rubbing sensation. The patient also reports constipation issues and has been using Senna for relief. She recently had a severe pain episode requiring a hospital visit and morphine administration. eHnrietta is concerned about her thyroid function and its potential impact on her weight gain. She also inquires about checking her blood sugar levels and hepatitis B immunity. Recent thyroid function tests show elevated TSH (28.28) and low free T4 (0.8), indicating hypothyroidism. Previous A1C in April was 5.6%. Cholesterol slightly elevated at 201 mg/dL, with LDL at 123 mg/dL. Iron saturation and ferritin levels are low. Folate level is slightly low at 3.4. CRP (inflammatory marker) was negative.

## 2025-06-10 NOTE — PHYSICAL EXAM
Notified patient if she feels better she is okay to get it this week, patient says she will wait until next week in this case.    [Normal] : no joint swelling and grossly normal strength and tone [de-identified] : negative Biggs sign

## 2025-06-10 NOTE — REVIEW OF SYSTEMS
[Recent Change In Weight] : ~T recent weight change [Abdominal Pain] : abdominal pain [Anxiety] : anxiety [Negative] : Neurological

## 2025-06-11 LAB
MEV IGG FLD QL IA: 120 AU/ML
MEV IGG+IGM SER-IMP: POSITIVE
MUV AB SER-ACNC: POSITIVE
MUV IGG SER QL IA: 25.4 AU/ML
RUBV IGG FLD-ACNC: 4.51 INDEX
RUBV IGG SER-IMP: POSITIVE
VZV AB TITR SER: POSITIVE
VZV IGG SER IF-ACNC: 31.9 S/CO

## 2025-06-12 ENCOUNTER — APPOINTMENT (OUTPATIENT)
Dept: ELECTROPHYSIOLOGY | Facility: CLINIC | Age: 60
End: 2025-06-12

## 2025-06-12 PROCEDURE — 93298 REM INTERROG DEV EVAL SCRMS: CPT

## 2025-06-18 ENCOUNTER — TRANSCRIPTION ENCOUNTER (OUTPATIENT)
Age: 60
End: 2025-06-18

## 2025-06-19 ENCOUNTER — TRANSCRIPTION ENCOUNTER (OUTPATIENT)
Age: 60
End: 2025-06-19

## 2025-06-19 NOTE — REASON FOR VISIT
No [Patient] : Patient [FreeTextEntry1] : Pt here for management of anxiety and depression in the context of chronic pain.

## 2025-06-25 ENCOUNTER — TRANSCRIPTION ENCOUNTER (OUTPATIENT)
Age: 60
End: 2025-06-25

## 2025-07-02 ENCOUNTER — NON-APPOINTMENT (OUTPATIENT)
Age: 60
End: 2025-07-02

## 2025-07-02 ENCOUNTER — TRANSCRIPTION ENCOUNTER (OUTPATIENT)
Age: 60
End: 2025-07-02

## 2025-07-10 ENCOUNTER — APPOINTMENT (OUTPATIENT)
Dept: FAMILY MEDICINE | Facility: CLINIC | Age: 60
End: 2025-07-10

## 2025-07-17 ENCOUNTER — NON-APPOINTMENT (OUTPATIENT)
Age: 60
End: 2025-07-17

## 2025-07-17 ENCOUNTER — APPOINTMENT (OUTPATIENT)
Dept: ELECTROPHYSIOLOGY | Facility: CLINIC | Age: 60
End: 2025-07-17

## 2025-07-17 ENCOUNTER — APPOINTMENT (OUTPATIENT)
Dept: PSYCHIATRY | Facility: CLINIC | Age: 60
End: 2025-07-17
Payer: COMMERCIAL

## 2025-07-17 PROCEDURE — 99214 OFFICE O/P EST MOD 30 MIN: CPT

## 2025-07-17 PROCEDURE — 93298 REM INTERROG DEV EVAL SCRMS: CPT

## 2025-07-17 NOTE — HISTORY OF PRESENT ILLNESS
[FreeTextEntry1] : Pt reports that she is in therapy with Roxie Burnham but has a sense of abandonment based on issues in the past.  Pt reports that her dog  on Mother's Day. 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  Pt reports on 60 mg of Duloxetine she is too sedated. Pt reports weight gain which affects her energy. Pt had also gained 20 lbs on higher dose.  Pt also reports this affects her body image.  Pt states she has marital discord; states she is safe. Pt reports memory is poor, denied suicidal or homicidal ideation intent or plan.  Pt does see herself as a good, parent, but faces rejection from her mother, sister, and spouse.  Pt reports recent bee stings as well. Pt reports that her social life is very much hindered by having a "hidden" disability.

## 2025-07-17 NOTE — PLAN
[No] : No [Medication education provided] : Medication education provided. [Rationale for medication choices, possible risks/precautions, benefits, alternative treatment choices, and consequences of non-treatment discussed] : Rationale for medication choices, possible risks/precautions, benefits, alternative treatment choices, and consequences of non-treatment discussed with patient/family/caregiver  [FreeTextEntry4] : Meeting goals of care, but with exacerbation of anxiety and depression based on recent rejections. [FreeTextEntry5] : Discussed ramifications and stigma of having hidden illnesses, and expectations of wellness from others- particularly family members that  expect recovery, when clinical course is c/w "good days and bad days", and periods of rest, activity, recovery.  Defer increase of Duloxetine 50 mg for pain, anxiety and depression for now, remains on 50 mg, and stop Wellbutrin due to paradoxical s/e of increased fatigue, that pt states would impair ability to drive.

## 2025-07-17 NOTE — PHYSICAL EXAM
[Cooperative] : cooperative [Anxious] : anxious [Clear] : clear [Linear/Goal Directed] : linear/goal directed [None] : none [None Reported] : none reported [Memory] : memory [Average] : average [FreeTextEntry2] : chronic back pain.  [WNL] : within normal limits [Depressed] : depressed [Constricted] : constricted [Depressive] : depressive [Self-Deprecatory] : self-deprecatory [FreeTextEntry1] : adequately groomed and dressed.

## 2025-08-19 ENCOUNTER — LABORATORY RESULT (OUTPATIENT)
Age: 60
End: 2025-08-19

## 2025-08-19 ENCOUNTER — APPOINTMENT (OUTPATIENT)
Dept: RHEUMATOLOGY | Facility: CLINIC | Age: 60
End: 2025-08-19
Payer: COMMERCIAL

## 2025-08-19 VITALS
DIASTOLIC BLOOD PRESSURE: 78 MMHG | WEIGHT: 155 LBS | RESPIRATION RATE: 16 BRPM | HEIGHT: 65 IN | SYSTOLIC BLOOD PRESSURE: 118 MMHG | OXYGEN SATURATION: 96 % | TEMPERATURE: 97.9 F | HEART RATE: 87 BPM | BODY MASS INDEX: 25.83 KG/M2

## 2025-08-19 DIAGNOSIS — Z79.1 LONG TERM (CURRENT) USE OF NON-STEROIDAL ANTI-INFLAMMATORIES (NSAID): ICD-10-CM

## 2025-08-19 PROCEDURE — G2211 COMPLEX E/M VISIT ADD ON: CPT | Mod: NC

## 2025-08-19 PROCEDURE — 99214 OFFICE O/P EST MOD 30 MIN: CPT

## 2025-08-19 RX ORDER — MELOXICAM 7.5 MG/1
7.5 TABLET ORAL
Qty: 60 | Refills: 3 | Status: ACTIVE | COMMUNITY
Start: 2025-08-19 | End: 1900-01-01

## 2025-08-21 ENCOUNTER — APPOINTMENT (OUTPATIENT)
Dept: FAMILY MEDICINE | Facility: CLINIC | Age: 60
End: 2025-08-21
Payer: COMMERCIAL

## 2025-08-21 ENCOUNTER — APPOINTMENT (OUTPATIENT)
Dept: ELECTROPHYSIOLOGY | Facility: CLINIC | Age: 60
End: 2025-08-21

## 2025-08-21 VITALS
BODY MASS INDEX: 25.96 KG/M2 | HEART RATE: 88 BPM | OXYGEN SATURATION: 98 % | SYSTOLIC BLOOD PRESSURE: 120 MMHG | DIASTOLIC BLOOD PRESSURE: 84 MMHG | RESPIRATION RATE: 16 BRPM | TEMPERATURE: 98.7 F | WEIGHT: 156 LBS

## 2025-08-21 DIAGNOSIS — E03.9 HYPOTHYROIDISM, UNSPECIFIED: ICD-10-CM

## 2025-08-21 DIAGNOSIS — M79.7 FIBROMYALGIA: ICD-10-CM

## 2025-08-21 DIAGNOSIS — E66.9 OBESITY, UNSPECIFIED: ICD-10-CM

## 2025-08-21 DIAGNOSIS — F41.9 ANXIETY DISORDER, UNSPECIFIED: ICD-10-CM

## 2025-08-21 DIAGNOSIS — M85.851 OTHER SPECIFIED DISORDERS OF BONE DENSITY AND STRUCTURE, RIGHT THIGH: ICD-10-CM

## 2025-08-21 DIAGNOSIS — F32.A ANXIETY DISORDER, UNSPECIFIED: ICD-10-CM

## 2025-08-21 PROCEDURE — G2211 COMPLEX E/M VISIT ADD ON: CPT | Mod: NC

## 2025-08-21 PROCEDURE — 99215 OFFICE O/P EST HI 40 MIN: CPT

## 2025-08-21 PROCEDURE — 93298 REM INTERROG DEV EVAL SCRMS: CPT

## 2025-08-21 RX ORDER — TIRZEPATIDE 2.5 MG/.5ML
2.5 INJECTION, SOLUTION SUBCUTANEOUS
Qty: 1 | Refills: 0 | Status: ACTIVE | COMMUNITY
Start: 2025-08-21 | End: 1900-01-01

## 2025-08-26 PROBLEM — E66.9 OBESITY: Status: ACTIVE | Noted: 2025-08-21

## 2025-08-28 ENCOUNTER — APPOINTMENT (OUTPATIENT)
Dept: PSYCHIATRY | Facility: CLINIC | Age: 60
End: 2025-08-28
Payer: COMMERCIAL

## 2025-08-28 DIAGNOSIS — F33.1 MAJOR DEPRESSIVE DISORDER, RECURRENT, MODERATE: ICD-10-CM

## 2025-08-28 DIAGNOSIS — G47.00 INSOMNIA, UNSPECIFIED: ICD-10-CM

## 2025-08-28 PROCEDURE — 99213 OFFICE O/P EST LOW 20 MIN: CPT

## 2025-09-10 ENCOUNTER — NON-APPOINTMENT (OUTPATIENT)
Age: 60
End: 2025-09-10

## 2025-09-10 ENCOUNTER — TRANSCRIPTION ENCOUNTER (OUTPATIENT)
Age: 60
End: 2025-09-10

## (undated) DEVICE — CONTAINER FORMALIN BUFF 10% 60ML

## (undated) DEVICE — FORCEP RADIAL JAW 4 240CM DISP

## (undated) DEVICE — POLY TRAP ETRAP

## (undated) DEVICE — SOL IRR POUR H2O 1000ML

## (undated) DEVICE — FORCEP RADIAL JAW 4 W NDL 2.4MM 2.8MM 240CM ORANGE DISP

## (undated) DEVICE — PLATE NESSY 170

## (undated) DEVICE — ENDOCUFF VISION SZ 2 LG GRN

## (undated) DEVICE — KIT ENDO PROCEDURE CUST W/VLV

## (undated) DEVICE — TUBING CAP SET ERBEFLO CLEVERCAP HYBRID CO2 FOR OLYMPUS SCOPES AND UCR

## (undated) DEVICE — Device

## (undated) DEVICE — SNARE POLYP SENS SM 13MM 240CM

## (undated) DEVICE — SNARE EXACTO COLD 9MMX230CM

## (undated) DEVICE — BITE BLOCK ADULT 20 X 27MM (GREEN)